# Patient Record
Sex: MALE | Race: WHITE | Employment: FULL TIME | URBAN - METROPOLITAN AREA
[De-identification: names, ages, dates, MRNs, and addresses within clinical notes are randomized per-mention and may not be internally consistent; named-entity substitution may affect disease eponyms.]

---

## 2017-01-30 ENCOUNTER — HOSPITAL ENCOUNTER (OUTPATIENT)
Dept: PHYSICAL THERAPY | Age: 57
Discharge: HOME | End: 2017-01-30
Payer: COMMERCIAL

## 2017-01-30 DIAGNOSIS — M25.511 ACUTE PAIN OF RIGHT SHOULDER: ICD-10-CM

## 2017-01-30 PROCEDURE — 97110 THERAPEUTIC EXERCISES: CPT

## 2017-01-30 PROCEDURE — 97162 PT EVAL MOD COMPLEX 30 MIN: CPT

## 2017-01-30 NOTE — PROGRESS NOTES
Izzy Lex Vásquez.  : 1960 [id number] [facility],[address]  [address] [place] [address].  [phone]   [phone]        OUTPATIENT PHYSICAL [place] Assessment 2017   Visit #1     ICD-10: Treatment Diagnosis: Cervicalgia (M54.2) , Abnormal posture (R29.3) , Radiculopathy, cervical region (M54.12)    Precautions/Allergies:   Bactrim [sulfamethoxazole-trimethoprim]   Fall Risk Score: 1 (? 5 = High Risk)  MD Orders: Eval and Treat  MEDICAL/REFERRING DIAGNOSIS:  Acute pain of right shoulder [M25.511]   DATE OF ONSET: 2016  REFERRING PHYSICIAN: Dhara Medeiros MD  RETURN PHYSICIAN APPOINTMENT: tbd      INITIAL ASSESSMENT:  Mr. Jeffrey Galeazzi presents to therapy with pain in the neck radiating down the R arm causing weakness, difficulty with functional activities and numbness and tingling. Pt would benefit from skilled PT for above deficits to return to prior level of function painfree. PROBLEM LIST (Impacting functional limitations):  1. Decreased Strength  2. Decreased ADL/Functional Activities  3. Increased Pain  4. Decreased Activity Tolerance  5. Decreased Flexibility/Joint Mobility INTERVENTIONS PLANNED:  1. Cold  2. Electrical Stimulation  3. Heat  4. Home Exercise Program (HEP)  5. Manual Therapy  6. Range of Motion (ROM)  7. Therapeutic Exercise/Strengthening  8. Ultrasound (US)  9. Traction   TREATMENT PLAN:  Effective Dates: 17 TO 3-10-17. Frequency/Duration: 2 times a week for 6 weeks  GOALS: (Goals have been discussed and agreed upon with patient.)  Short-Term Functional Goals: Time Frame: 2 weeks   1. Mr. Jeffrey Galeazzi to be independent with HEP. 2. Pt able to tolerate all stretches with improved symptoms in the R arm and less reported numbness and tingling. 3. Pt to have less episodes of waking up from symptoms with proper posture and improved positioning while sleeping. Discharge Goals: Time Frame: 6 weeks   1.  Pt able to tolerate increased work activities without symptoms in the R arm and no pain in the neck 100% of time. 2. Pt able to tolerate static sitting and standing without pain in the neck and no numbness in the R arm. 3. Pt to have no limitations at work and no strength deficits noted in the R arm due to the pain and the numbness 100% of time. Rehabilitation Potential For Stated Goals: Good  Regarding Izzy Burnett Sr.'s therapy, I certify that the treatment plan above will be carried out by a therapist or under their direction. Thank you for this referral,  Belinda Goltz, PT, DPT       Referring Physician Signature: Dhara Medeiros MD              Date                      HISTORY:   History of Present Injury/Illness (Reason for Referral):  Pt 63 y/o M with pain in the R shoulder and arm with radiating symptoms into the hand that started a few months ago. Pt has had some muscle relaxers which helped and he also had some steriod injections (not into the muscle) which loosened up the muscle as well but it still keeps coming back. He feels the symptoms with heavy activities and work activities. Pt stated the pain was constant until the medications. Now he is having tingling and numbness and he feels like the relief comes after popping his neck. He has C6-C7 fusion in 2011 and his last MRI was 2014. These symptoms are not the original pain he had prior to the fusion. Pt is waking up from tingling and is having to reposition on his back to relieve the symptoms. Past Medical History/Comorbidities:   Mr. Jeffrey Galeazzi  has a past medical history of Chest pain (8/11/2016); Chronic cellulitis; Diabetes ([facility] [address] History of kidney stones; Hypertension; Obesity; and Unspecified adverse effect of anesthesia (1997). He also has no past medical history of Coagulation defects; COPD; DEMENTIA; Gastrointestinal disorder; Infectious disease; or Neurological disorder.   Mr. Jeffrey Galeazzi  has a past surgical history that includes urological (2002); cervical fusion; cervical fusion (6-20-12); hernia repair; sinus surgery proc unlisted (1997); orthopaedic (Right); mohs procedure (Right); knee arthroscopy (Right, 2012); and knee arthroscopy (Left, 2013). 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 , Disp: 15 Tab, Rfl: 0    MAGNESIUM PO, Take  by mouth daily. , Disp: , Rfl:     BENICAR HCT 40-12.5 mg per tablet, TAKE 1 TABLET BY MOUTH EVERY DAY, Disp: 90 Tab, Rfl: 1    glimepiride (AMARYL) 2 mg tablet, TAKE 1 TABLET BY MOUTH TWO (2) TIMES A DAY., Disp: 90 Tab, Rfl: 1    multivitamin (ONE A DAY) tablet, Take 1 Tab by mouth daily. , Disp: , Rfl:     aspirin 81 mg chewable tablet, Take 1 Tab by mouth daily. , Disp: 30 Tab, Rfl: 0    albuterol (PROVENTIL HFA, VENTOLIN HFA, PROAIR HFA) 90 mcg/actuation inhaler, Take 2 Puffs by inhalation every four (4) hours as needed. Indications: ACUTE ASTHMA ATTACK, Disp: 3 Inhaler, Rfl: 1    metFORMIN (GLUCOPHAGE) 1,000 mg tablet, Take 1 Tab by mouth two (2) times daily (with meals). , Disp: 180 Tab, Rfl: 1    albuterol (PROVENTIL VENTOLIN) 2.5 mg /3 mL (0.083 %) nebulizer solution, 3 mL by Nebulization route every four (4) hours as needed for Wheezing., Disp: 24 Each, Rfl: 5    valACYclovir (VALTREX) 1 gram tablet, Take 1 Tab by mouth daily. , Disp: 30 Tab, Rfl: 11    fluticasone-vilanterol (BREO ELLIPTA) 200-25 mcg/dose inhaler, 1 inhalation daily, rinse mouth after use, Disp: 1 Inhaler, Rfl: 11    cpap machine kit, , Disp: , Rfl:     montelukast (SINGULAIR) 10 mg tablet, Take 10 mg by mouth daily., Disp: , Rfl:    Date Last Reviewed:  1/30/2017   # of Personal Factors/Comorbidities that affect the Plan of Care: 3+: HIGH COMPLEXITY   EXAMINATION:   Observation/Orthostatic Postural Assessment:          Standing with forward head   Palpation:          No tenderness to touch   ROM:     Cervical ROM is WFL except limitation in extension due to fusion       Strength:     L shoulder      Flexion:      Extension:      Abduction:      ER:      IR:    R shoulder       Flexion:       Extension:       Abduction:       ER:       IR:    Special Tests:          Compression (  Neurological Screen:        Neural Tension Tests:  Some tightness noted in the median nerve R UEs         Sensation: numbness and tingling in the R arm into the fingers but relieved with neck manipulation   Functional Mobility:         Independent   Balance:          Good    Body Structures Involved:  1. Nerves  2. Joints  3. Muscles Body Functions Affected:  1. Sensory/Pain  2. Movement Related Activities and Participation Affected:  1. General Tasks and Demands  2. Mobility   # of elements that affect the Plan of Care: 4+: HIGH COMPLEXITY   CLINICAL PRESENTATION:   Presentation: Evolving clinical presentation with changing clinical characteristics: MODERATE COMPLEXITY   CLINICAL DECISION MAKING:   Outcome Measure: Tool Used: VAS   Score:  Initial: 4/10 overall constant in R arm Most Recent: X (Date: -- )   Interpretation of Score: 40% impaired   Outcome Measure Conversion Site    Medical Necessity:   · Patient is expected to demonstrate progress in strength and range of motion to increase independence with functional activities painfree. .  Reason for Services/Other Comments:  · Patient continues to require present interventions due to patient's inability to perform functional and work activities painfree.  .   Use of outcome tool(s) and clinical judgement create a POC that gives a: Questionable prediction of patient's progress: MODERATE COMPLEXITY   TREATMENT:   (In addition to Assessment/Re-Assessment sessions the following treatments were rendered)  THERAPEUTIC EXERCISE: (see below for minutes):  Exercises per grid below to improve mobility and strength. Required minimal visual and verbal cues to promote proper body alignment, promote proper body posture and promote proper body mechanics. Progressed resistance, range and repetitions as indicated. MANUAL THERAPY: (see below for minutes): Joint mobilization and Soft tissue mobilization was utilized and necessary because of the patient's restricted joint motion, painful spasm and restricted motion of soft tissue. MODALITIES: (see below for minutes):      see chart below for details     Date: 1-30-17       Modalities:                                Therapeutic Exercise: 15 mins        Upper trap stretch  2x10SH bilateral        Levator stretch  2x10SH bilateral        Doorway stretch  2x10SH        Scapular retraction  x30        Cervical retraction  3x10                Proprioceptive Activities:                                Manual Therapy:                        Functional Activities:                                        HEP: Pt was given upper trap stretch, doorway stretch, levator stretch and scap retraction and cervical retraction for HEP and educated on all exercises. Treatment/Session Assessment:  Pt was told to not pop his neck by himself for the next few days and to just try to do the stretching for relief of the symptoms. Pt was also told to watch posture at work and to take frequent rest breaks as much as possible to help with the irritibility of the symptoms. Pt seems to have 1 of 3 issues going on. Either bulging disc, degenerated disc above or below the fusion encroaching on a nerve, or possible muscle spasms laying on nerve causing irritation.  Pt would benefit from modalities for pain relief, stretching for HEP and manual therapy for stretching as well as relief of spasm, neural flossing stretches for the R arm and postural reeducation. · Pain/ Symptoms: Initial:   4/10  Post Session:  4/10  ·   Compliance with Program/Exercises: Will assess as treatment progresses. · Recommendations/Intent for next treatment session: \"Next visit will focus on advancements to more challenging activities\".   Total Treatment Duration:  PT Patient Time In/Time Out  Time In: 1015  Time Out: Isabel 60, PT, DPT

## 2017-01-30 NOTE — PROGRESS NOTES
Ambulatory/Rehab Services H2 Model Falls Risk Assessment    Risk Factor Pts. ·   Confusion/Disorientation/Impulsivity  []    4 ·   Symptomatic Depression  []   2 ·   Altered Elimination  []   1 ·   Dizziness/Vertigo  []   1 ·   Gender (Male)  [x]   1 ·   Any administered antiepileptics (anticonvulsants):  []   2 ·   Any administered benzodiazepines:  []   1 ·   Visual Impairment (specify):  []   1 ·   Portable Oxygen Use  []   1 ·   Orthostatic ? BP  []   1 ·   History of Recent Falls (within 3 mos.)  []   5     Ability to Rise from Chair (choose one) Pts. ·   Ability to rise in a single movement  [x]   0 ·   Pushes up, successful in one attempt  []   1 ·   Multiple attempts, but successful  []   3 ·   Unable to rise without assistance  []   4   Total: (5 or greater = High Risk) 1     Falls Prevention Plan:   []                Physical Limitations to Exercise (specify):   []                Mobility Assistance Device (type):   []                Exercise/Equipment Adaptation (specify):    ©2010 [facility] Patent #[id number].  Federal Law prohibits the replication, distribution or use without written permission from [facility] [employer]

## 2017-01-31 ENCOUNTER — HOSPITAL ENCOUNTER (OUTPATIENT)
Dept: PHYSICAL THERAPY | Age: 57
Discharge: HOME | End: 2017-01-31
Payer: COMMERCIAL

## 2017-01-31 PROCEDURE — 97140 MANUAL THERAPY 1/> REGIONS: CPT

## 2017-01-31 PROCEDURE — 97110 THERAPEUTIC EXERCISES: CPT

## 2017-01-31 PROCEDURE — 97014 ELECTRIC STIMULATION THERAPY: CPT

## 2017-01-31 NOTE — PROGRESS NOTES
Mayur Bradley .  : 1960 [address] [facility],[address]  [address] [place] [address].  [phone]   [phone]        OUTPATIENT PHYSICAL THERAPY:Daily Note 2017   Visit #2     ICD-10: Treatment Diagnosis: Cervicalgia (M54.2) , Abnormal posture (R29.3) , Radiculopathy, cervical region (M54.12)    Precautions/Allergies:   Bactrim [sulfamethoxazole-trimethoprim]   Fall Risk Score: 1 (? 5 = High Risk)  MD Orders: Eval and Treat  MEDICAL/REFERRING DIAGNOSIS:  Pain in right shoulder [M25.511]   DATE OF ONSET: 2016  REFERRING PHYSICIAN: Ravin Posada MD  RETURN PHYSICIAN APPOINTMENT: tbd      INITIAL ASSESSMENT:  Mr. Cintron Patient presents to therapy with pain in the neck radiating down the R arm causing weakness, difficulty with functional activities and numbness and tingling. Pt would benefit from skilled PT for above deficits to return to prior level of function painfree. PROBLEM LIST (Impacting functional limitations):  1. Decreased Strength  2. Decreased ADL/Functional Activities  3. Increased Pain  4. Decreased Activity Tolerance  5. Decreased Flexibility/Joint Mobility INTERVENTIONS PLANNED:  1. Cold  2. Electrical Stimulation  3. Heat  4. Home Exercise Program (HEP)  5. Manual Therapy  6. Range of Motion (ROM)  7. Therapeutic Exercise/Strengthening  8. Ultrasound (US)  9. Traction   TREATMENT PLAN:  Effective Dates: 17 TO 3-10-17. Frequency/Duration: 2 times a week for 6 weeks  GOALS: (Goals have been discussed and agreed upon with patient.)  Short-Term Functional Goals: Time Frame: 2 weeks   1. Mr. Cintron Patient to be independent with HEP. 2. Pt able to tolerate all stretches with improved symptoms in the R arm and less reported numbness and tingling. 3. Pt to have less episodes of waking up from symptoms with proper posture and improved positioning while sleeping. Discharge Goals: Time Frame: 6 weeks   1.  Pt able to tolerate increased work activities without symptoms in the R arm and no pain in the neck 100% of time. 2. Pt able to tolerate static sitting and standing without pain in the neck and no numbness in the R arm. 3. Pt to have no limitations at work and no strength deficits noted in the R arm due to the pain and the numbness 100% of time. Rehabilitation Potential For Stated Goals: Good                HISTORY:   History of Present Injury/Illness (Reason for Referral):  Pt 65 y/o M with pain in the R shoulder and arm with radiating symptoms into the hand that started a few months ago. Pt has had some muscle relaxers which helped and he also had some steriod injections (not into the muscle) which loosened up the muscle as well but it still keeps coming back. He feels the symptoms with heavy activities and work activities. Pt stated the pain was constant until the medications. Now he is having tingling and numbness and he feels like the relief comes after popping his neck. He has C6-C7 fusion in 2011 and his last MRI was 2014. These symptoms are not the original pain he had prior to the fusion. Pt is waking up from tingling and is having to reposition on his back to relieve the symptoms. Past Medical History/Comorbidities:   Mr. Tracy Gee  has a past medical history of Chest pain (8/11/2016); Chronic cellulitis; Diabetes ([facility] [address] History of kidney stones; Hypertension; Obesity; and Unspecified adverse effect of anesthesia (1997). He also has no past medical history of Coagulation defects; COPD; DEMENTIA; Gastrointestinal disorder; Infectious disease; or Neurological disorder. Mr. Tracy Gee  has a past surgical history that includes urological (2002); cervical fusion; cervical fusion (6-20-12); hernia repair; sinus surgery proc unlisted (1997); orthopaedic (Right); mohs procedure (Right); knee arthroscopy (Right, 2012); and knee arthroscopy (Left, 2013).   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 , Disp: 15 Tab, Rfl: 0    MAGNESIUM PO, Take  by mouth daily. , Disp: , Rfl:     BENICAR HCT 40-12.5 mg per tablet, TAKE 1 TABLET BY MOUTH EVERY DAY, Disp: 90 Tab, Rfl: 1    glimepiride (AMARYL) 2 mg tablet, TAKE 1 TABLET BY MOUTH TWO (2) TIMES A DAY., Disp: 90 Tab, Rfl: 1    multivitamin (ONE A DAY) tablet, Take 1 Tab by mouth daily. , Disp: , Rfl:     aspirin 81 mg chewable tablet, Take 1 Tab by mouth daily. , Disp: 30 Tab, Rfl: 0    albuterol (PROVENTIL HFA, VENTOLIN HFA, PROAIR HFA) 90 mcg/actuation inhaler, Take 2 Puffs by inhalation every four (4) hours as needed. Indications: ACUTE ASTHMA ATTACK, Disp: 3 Inhaler, Rfl: 1    metFORMIN (GLUCOPHAGE) 1,000 mg tablet, Take 1 Tab by mouth two (2) times daily (with meals). , Disp: 180 Tab, Rfl: 1    albuterol (PROVENTIL VENTOLIN) 2.5 mg /3 mL (0.083 %) nebulizer solution, 3 mL by Nebulization route every four (4) hours as needed for Wheezing., Disp: 24 Each, Rfl: 5    valACYclovir (VALTREX) 1 gram tablet, Take 1 Tab by mouth daily. , Disp: 30 Tab, Rfl: 11    fluticasone-vilanterol (BREO ELLIPTA) 200-25 mcg/dose inhaler, 1 inhalation daily, rinse mouth after use, Disp: 1 Inhaler, Rfl: 11    cpap machine kit, , Disp: , Rfl:     montelukast (SINGULAIR) 10 mg tablet, Take 10 mg by mouth daily. , Disp: , Rfl:    Date Last Reviewed:  1/31/2017   EXAMINATION:   Observation/Orthostatic Postural Assessment:          Standing with forward head   Palpation:          No tenderness to touch   ROM:     Cervical ROM is WFL except limitation in extension due to fusion       Strength:     L shoulder      Flexion:      Extension:      Abduction:      ER:      IR:    R shoulder       Flexion:       Extension:       Abduction:       ER:       IR:    Special Tests:          Compression (  Neurological Screen:        Neural Tension Tests:  Some tightness noted in the median nerve R UEs         Sensation: numbness and tingling in the R arm into the fingers but relieved with neck manipulation   Functional Mobility:         Independent   Balance:          Good    Body Structures Involved:  1. Nerves  2. Joints  3. Muscles Body Functions Affected:  1. Sensory/Pain  2. Movement Related Activities and Participation Affected:  1. General Tasks and Demands  2. Mobility   CLINICAL PRESENTATION:   CLINICAL DECISION MAKING:   Outcome Measure: Tool Used: VAS   Score:  Initial: 4/10 overall constant in R arm Most Recent: X (Date: -- )   Interpretation of Score: 40% impaired   Outcome Measure Conversion Site    Medical Necessity:   · Patient is expected to demonstrate progress in strength and range of motion to increase independence with functional activities painfree. .  Reason for Services/Other Comments:  · Patient continues to require present interventions due to patient's inability to perform functional and work activities painfree. .   TREATMENT:   (In addition to Assessment/Re-Assessment sessions the following treatments were rendered)  THERAPEUTIC EXERCISE: (see below for minutes):  Exercises per grid below to improve mobility and strength. Required minimal visual and verbal cues to promote proper body alignment, promote proper body posture and promote proper body mechanics. Progressed resistance, range and repetitions as indicated. MANUAL THERAPY: (see below for minutes): Joint mobilization and Soft tissue mobilization was utilized and necessary because of the patient's restricted joint motion, painful spasm and restricted motion of soft tissue.    MODALITIES: (see below for minutes):      see chart below for details     Date: 1-30-17 1-31-17      Modalities:  15 mins       IFC with HP to R scapular and upper trap region   15 mins prior to tx in sitting                      Therapeutic Exercise: 15 mins  15 mins       Upper trap stretch  2x10SH bilateral  Repeat       Levator stretch  2x10SH bilateral  Repeat       Doorway stretch  2x10SH        Scapular retraction  x30        Cervical retraction  3x10        Median nerve neural flossing   3x5 reps       Proprioceptive Activities:                                Manual Therapy:  15 mins       STM upper trap and scapular region R side   15 mins               Functional Activities:                                        HEP: Pt was given upper trap stretch, doorway stretch, levator stretch and scap retraction and cervical retraction for HEP and educated on all exercises. Treatment/Session Assessment: Pt stated he felt better today after the stretching. Pt was given neural stretching for median nerve for HEP. Continue with POC. · Pain/ Symptoms: Initial:   1/10   \"I had a little fall today but it was my foot. I did my exercises and my arm immediately went numb with the levator stretch. \"  Post Session:  1/10  ·   Compliance with Program/Exercises: Will assess as treatment progresses. · Recommendations/Intent for next treatment session: \"Next visit will focus on advancements to more challenging activities\".   Total Treatment Duration:  PT Patient Time In/Time Out  Time In: 1100  Time Out: 1215     Manda Smith, PT, DPT

## 2017-02-07 ENCOUNTER — HOSPITAL ENCOUNTER (OUTPATIENT)
Dept: PHYSICAL THERAPY | Age: 57
Discharge: HOME | End: 2017-02-07

## 2017-02-10 ENCOUNTER — HOSPITAL ENCOUNTER (OUTPATIENT)
Dept: PHYSICAL THERAPY | Age: 57
Discharge: HOME | End: 2017-02-10

## 2017-04-17 NOTE — PROGRESS NOTES
Carlos Enrique Camejo .  : 1960 [address]  [phone]   [phone]        OUTPATIENT PHYSICAL THERAPY:Discontinuation Summary 2017        ICD-10: Treatment Diagnosis: Cervicalgia (M54.2) , Abnormal posture (R29.3) , Radiculopathy, cervical region (M54.12)    Precautions/Allergies:   Bactrim [sulfamethoxazole-trimethoprim]   Fall Risk Score: 1 (? 5 = High Risk)  MD Orders: Eval and Treat  MEDICAL/REFERRING DIAGNOSIS:  Pain in right shoulder [M25.511]   DATE OF ONSET: 2016  REFERRING PHYSICIAN: Karla Lomax MD  RETURN PHYSICIAN APPOINTMENT: tbd      INITIAL ASSESSMENT:  Mr. Philip Nam presents to therapy with pain in the neck radiating down the R arm causing weakness, difficulty with functional activities and numbness and tingling. Pt would benefit from skilled PT for above deficits to return to prior level of function painfree. PROBLEM LIST (Impacting functional limitations):  1. Decreased Strength  2. Decreased ADL/Functional Activities  3. Increased Pain  4. Decreased Activity Tolerance  5. Decreased Flexibility/Joint Mobility INTERVENTIONS PLANNED:  1. Cold  2. Electrical Stimulation  3. Heat  4. Home Exercise Program (HEP)  5. Manual Therapy  6. Range of Motion (ROM)  7. Therapeutic Exercise/Strengthening  8. Ultrasound (US)  9. Traction   TREATMENT PLAN:  Effective Dates: 17 TO 3-10-17. Frequency/Duration: 2 times a week for 6 weeks  GOALS: (Goals have been discussed and agreed upon with patient.)  Short-Term Functional Goals: Time Frame: 2 weeks   1. Mr. Philip Nam to be independent with HEP. 2. Pt able to tolerate all stretches with improved symptoms in the R arm and less reported numbness and tingling. 3. Pt to have less episodes of waking up from symptoms with proper posture and improved positioning while sleeping. Discharge Goals: Time Frame: 6 weeks   1.  Pt able to tolerate increased work activities without symptoms in the R arm and no pain in the neck 100% of time. 2. Pt able to tolerate static sitting and standing without pain in the neck and no numbness in the R arm. 3. Pt to have no limitations at work and no strength deficits noted in the R arm due to the pain and the numbness 100% of time. Rehabilitation Potential For Stated Goals: Good                HISTORY:   History of Present Injury/Illness (Reason for Referral):  Pt 65 y/o M with pain in the R shoulder and arm with radiating symptoms into the hand that started a few months ago. Pt has had some muscle relaxers which helped and he also had some steriod injections (not into the muscle) which loosened up the muscle as well but it still keeps coming back. He feels the symptoms with heavy activities and work activities. Pt stated the pain was constant until the medications. Now he is having tingling and numbness and he feels like the relief comes after popping his neck. He has C6-C7 fusion in 2011 and his last MRI was 2014. These symptoms are not the original pain he had prior to the fusion. Pt is waking up from tingling and is having to reposition on his back to relieve the symptoms. Past Medical History/Comorbidities:   Mr. Sunday Schwarz  has a past medical history of Chest pain (8/11/2016); Chronic cellulitis; Diabetes ([facility] [address] History of kidney stones; Hypertension; Obesity; and Unspecified adverse effect of anesthesia (1997). He also has no past medical history of Coagulation defects; COPD; DEMENTIA; Gastrointestinal disorder; Infectious disease; or Neurological disorder. Mr. Sunday Schwarz  has a past surgical history that includes urological (2002); cervical fusion; cervical fusion (6-20-12); hernia repair; sinus surgery proc unlisted (1997); orthopaedic (Right); mohs procedure (Right); knee arthroscopy (Right, 2012); and knee arthroscopy (Left, 2013).   Social History/Living Environment:     Lives with wife   Prior Level of Function/Work/Activity:  Works making scotch tape   Dominant Side:         RIGHT  Previous Treatment Approaches:          Muscle relaxers (not taking anymore)         Steroid shot (helped but not anymore)   Personal Factors:          Past/Current Experience:  Previous fusion at C6-7  Current Medications:    Current Outpatient Prescriptions:     glimepiride (AMARYL) 2 mg tablet, TAKE 1 TABLET BY MOUTH TWO (2) TIMES A DAY., Disp: 90 Tab, Rfl: 1    tadalafil (CIALIS) 5 mg tablet, Take 1 Tab by mouth daily. , Disp: 30 Tab, Rfl: 2    cefPROZIL (CEFZIL) 250 mg tablet, Take 1 Tab by mouth two (2) times a day., Disp: 20 Tab, Rfl: 0    methylPREDNISolone (MEDROL, DIEGO,) 4 mg tablet, Take 1 Tab by mouth Specific Days and Specific Times. , Disp: 1 Dose Pack, Rfl: 0    ketorolac (TORADOL) 10 mg tablet, Take 1 Tab by mouth three (3) times daily. , Disp: 15 Tab, Rfl: 0    MAGNESIUM PO, Take  by mouth daily. , Disp: , Rfl:     BENICAR HCT 40-12.5 mg per tablet, TAKE 1 TABLET BY MOUTH EVERY DAY, Disp: 90 Tab, Rfl: 1    multivitamin (ONE A DAY) tablet, Take 1 Tab by mouth daily. , Disp: , Rfl:     aspirin 81 mg chewable tablet, Take 1 Tab by mouth daily. , Disp: 30 Tab, Rfl: 0    albuterol (PROVENTIL HFA, VENTOLIN HFA, PROAIR HFA) 90 mcg/actuation inhaler, Take 2 Puffs by inhalation every four (4) hours as needed. Indications: ACUTE ASTHMA ATTACK, Disp: 3 Inhaler, Rfl: 1    metFORMIN (GLUCOPHAGE) 1,000 mg tablet, Take 1 Tab by mouth two (2) times daily (with meals). , Disp: 180 Tab, Rfl: 1    albuterol (PROVENTIL VENTOLIN) 2.5 mg /3 mL (0.083 %) nebulizer solution, 3 mL by Nebulization route every four (4) hours as needed for Wheezing., Disp: 24 Each, Rfl: 5    valACYclovir (VALTREX) 1 gram tablet, Take 1 Tab by mouth daily. , Disp: 30 Tab, Rfl: 11    fluticasone-vilanterol (BREO ELLIPTA) 200-25 mcg/dose inhaler, 1 inhalation daily, rinse mouth after use, Disp: 1 Inhaler, Rfl: 11    cpap machine kit, , Disp: , Rfl:    montelukast (SINGULAIR) 10 mg tablet, Take 10 mg by mouth daily. , Disp: , Rfl:    Date Last Reviewed:  1/31/2017   EXAMINATION:   Observation/Orthostatic Postural Assessment:          Standing with forward head   Palpation:          No tenderness to touch   ROM:     Cervical ROM is WFL except limitation in extension due to fusion       Strength:     L shoulder      Flexion:      Extension:      Abduction:      ER:      IR:    R shoulder       Flexion:       Extension:       Abduction:       ER:       IR:    Special Tests:          Compression (  Neurological Screen:        Neural Tension Tests:  Some tightness noted in the median nerve R UEs         Sensation: numbness and tingling in the R arm into the fingers but relieved with neck manipulation   Functional Mobility:         Independent   Balance:          Good    Body Structures Involved:  1. Nerves  2. Joints  3. Muscles Body Functions Affected:  1. Sensory/Pain  2. Movement Related Activities and Participation Affected:  1. General Tasks and Demands  2. Mobility   CLINICAL PRESENTATION:   CLINICAL DECISION MAKING:   Outcome Measure: Tool Used: VAS   Score:  Initial: 4/10 overall constant in R arm Most Recent: X (Date: -- )   Interpretation of Score: 40% impaired   Outcome Measure Conversion Site    Medical Necessity:   · Patient is expected to demonstrate progress in strength and range of motion to increase independence with functional activities painfree. .  Reason for Services/Other Comments:  · Patient continues to require present interventions due to patient's inability to perform functional and work activities painfree. .   TREATMENT:   (In addition to Assessment/Re-Assessment sessions the following treatments were rendered)  THERAPEUTIC EXERCISE: (see below for minutes):  Exercises per grid below to improve mobility and strength.   Required minimal visual and verbal cues to promote proper body alignment, promote proper body posture and promote proper body mechanics. Progressed resistance, range and repetitions as indicated. MANUAL THERAPY: (see below for minutes): Joint mobilization and Soft tissue mobilization was utilized and necessary because of the patient's restricted joint motion, painful spasm and restricted motion of soft tissue. MODALITIES: (see below for minutes):      see chart below for details     Date: 1-30-17 1-31-17      Modalities:  15 mins       IFC with HP to R scapular and upper trap region   15 mins prior to tx in sitting                      Therapeutic Exercise: 15 mins  15 mins       Upper trap stretch  2x10SH bilateral  Repeat       Levator stretch  2x10SH bilateral  Repeat       Doorway stretch  2x10SH        Scapular retraction  x30        Cervical retraction  3x10        Median nerve neural flossing   3x5 reps       Proprioceptive Activities:                                Manual Therapy:  15 mins       STM upper trap and scapular region R side   15 mins               Functional Activities:                                        HEP: Pt was given upper trap stretch, doorway stretch, levator stretch and scap retraction and cervical retraction for HEP and educated on all exercises. Treatment/Session Assessment: Pt did not return to therapy following 2nd appt. Pt is discontinued.       Manju Payment, PT, DPT

## 2017-06-05 ENCOUNTER — HOSPITAL ENCOUNTER (OUTPATIENT)
Dept: PHYSICAL THERAPY | Age: 57
Discharge: HOME | End: 2017-06-05
Payer: COMMERCIAL

## 2017-06-05 PROCEDURE — 97140 MANUAL THERAPY 1/> REGIONS: CPT

## 2017-06-05 PROCEDURE — 97162 PT EVAL MOD COMPLEX 30 MIN: CPT

## 2017-06-05 PROCEDURE — 97032 APPL MODALITY 1+ESTIM EA 15: CPT

## 2017-06-05 PROCEDURE — 97110 THERAPEUTIC EXERCISES: CPT

## 2017-06-05 NOTE — PROGRESS NOTES
Ambulatory/Rehab Services H2 Model Falls Risk Assessment    Risk Factor Pts. ·   Confusion/Disorientation/Impulsivity  []    4 ·   Symptomatic Depression  []   2 ·   Altered Elimination  []   1 ·   Dizziness/Vertigo  []   1 ·   Gender (Male)  [x]   1 ·   Any administered antiepileptics (anticonvulsants):  []   2 ·   Any administered benzodiazepines:  []   1 ·   Visual Impairment (specify):  []   1 ·   Portable Oxygen Use  []   1 ·   Orthostatic ? BP  []   1 ·   History of Recent Falls (within 3 mos.)  []   5     Ability to Rise from Chair (choose one) Pts. ·   Ability to rise in a single movement  [x]   0 ·   Pushes up, successful in one attempt  []   1 ·   Multiple attempts, but successful  []   3 ·   Unable to rise without assistance  []   4   Total: (5 or greater = High Risk) 1     Falls Prevention Plan:   []                Physical Limitations to Exercise (specify):   []                Mobility Assistance Device (type):   []                Exercise/Equipment Adaptation (specify):    ©2010 [facility] Patent #[id number].  Federal Law prohibits the replication, distribution or use without written permission from [facility]

## 2017-06-05 NOTE — PROGRESS NOTES
Ana Pagan Sr.  : 1960 [id number] [facility]  [address] [place] [address]  [phone]   [phone]        OUTPATIENT PHYSICAL THERAPY:Initial Assessment 2017        ICD-10: Treatment Diagnosis: Shoulder lesion, unspecified, right shoulder (M75.91)  Precautions/Allergies:   Bactrim [sulfamethoxazole-trimethoprim]   Fall Risk Score: 1 (? 5 = High Risk)  MD Orders: Eval and treat MEDICAL/REFERRING DIAGNOSIS:  Right shoulder pain [M25.511]   DATE OF ONSET:   REFERRING PHYSICIAN: Sunita Berger MD  RETURN PHYSICIAN APPOINTMENT: TBD     INITIAL ASSESSMENT:  Mr. Mony Brantley presents with cervical radiculopathy pain, upper cervical facet joint dysfunction, and right upper trapezius muscle strain. He will benefit from PT services to re mediate impairments. He has no red flags present at time of evaluation and is appropriate for PT services. He has a history of successful cervical spine fusions and has not had current imaging since he has onset of new symptoms. If he does not improve with PT services in the next 4 weeks recommend consult with orthopedic to rule out complications of his cervical spine fusions. PROBLEM LIST (Impacting functional limitations):  1. Decreased Strength  2. Decreased ADL/Functional Activities  3. Increased Pain  4. Decreased Activity Tolerance  5. Decreased Flexibility/Joint Mobility  6. Poor posture INTERVENTIONS PLANNED:  1. Cold  2. Cryotherapy  3. Electrical Stimulation  4. Heat  5. Home Exercise Program (HEP)  6. Manual Therapy  7. Neuromuscular Re-education/Strengthening  8. Range of Motion (ROM)  9. Therapeutic Activites  10. Therapeutic Exercise/Strengthening  11. Dry Needling, Mechanical Traction   TREATMENT PLAN:  Effective Dates: 17 TO 17.   Frequency/Duration: 2 times a week for 12 weeks  GOALS: (Goals have been discussed and agreed upon with patient.)  Short-Term Functional Goals: Time Frame: 2 weeks  1. Patient will be independent with HEP without pain. 2. Patient will report that sleep is improved to > 5 hours without waking up due to numbness and tingling. Discharge Goals: Time Frame: 12 weeks  1. Patient will have NDI score < 5 points allowing him improved community participation,  2. Patient will have C/S AROM improved to 80 deg rotation with centralization of symptoms so he can look over shoulder safely. 3. Patient will have posture WNL to reduce neck pain and centralize symptoms. Rehabilitation Potential For Stated Goals: Good  Regarding Alma George Sr.'s therapy, I certify that the treatment plan above will be carried out by a therapist or under their direction. Thank you for this referral,  Pancho Pichardo PT       Referring Physician Signature: Disha Mckeon MD              Date                      HISTORY:   History of Present Injury/Illness (Reason for Referral):  63 y/o M with complain of right upper trapezius pain and right hand numbness that is provoked with overhead movement. He wakes up when sleeping with numbness in hand. Pain is 9/10 at worst everyday constant. He has not had imaging. He has history of two C5-7 fusion 2002 and 2012. His first surgery was not successful and he had surgery again 10 years later. Before his neck surgery he reported  weakness in hand. He says his new symptoms are different. Past Medical History/Comorbidities:   Mr. Norm Carter  has a past medical history of Chest pain (8/11/2016); Chronic cellulitis; Diabetes ([facility] [address] History of kidney stones; Hypertension; Obesity; and Unspecified adverse effect of anesthesia (1997). He also has no past medical history of Coagulation defects; COPD; DEMENTIA; Gastrointestinal disorder; Infectious disease; or Neurological disorder.   Mr. Norm Carter  has a past surgical history that includes urological (2002); cervical fusion; cervical fusion (6-20-12); hernia repair; sinus surgery proc unlisted (1997); orthopaedic (Right); mohs procedure (Right); knee arthroscopy (Right, 2012); and knee arthroscopy (Left, 2013). Social History/Living Environment:    Lives with wife  Prior Level of Function/Work/Activity:      Independent, 50 hour/week work restriction due to health   Previous Treatment Approaches:          PT Jan 2017 that reduced symptoms but he had a dysruption in insurance coverage and he couldn't come anymore. He is not doing his HEP. Current Medications:    Current Outpatient Prescriptions:     methocarbamol (ROBAXIN) 500 mg tablet, Take 1 Tab by mouth nightly as needed. , Disp: 20 Tab, Rfl: 0    metFORMIN (GLUCOPHAGE) 1,000 mg tablet, TAKE 1 TAB BY MOUTH TWO (2) TIMES DAILY (WITH MEALS). , Disp: 180 Tab, Rfl: 1    valACYclovir (VALTREX) 1 gram tablet, TAKE 1 TAB BY MOUTH DAILY. , Disp: 30 Tab, Rfl: 7    glimepiride (AMARYL) 2 mg tablet, TAKE 1 TABLET BY MOUTH TWO (2) TIMES A DAY., Disp: 90 Tab, Rfl: 1    tadalafil (CIALIS) 5 mg tablet, Take 1 Tab by mouth daily. , Disp: 30 Tab, Rfl: 2    cefPROZIL (CEFZIL) 250 mg tablet, Take 1 Tab by mouth two (2) times a day., Disp: 20 Tab, Rfl: 0    methylPREDNISolone (MEDROL, DIEGO,) 4 mg tablet, Take 1 Tab by mouth Specific Days and Specific Times. , Disp: 1 Dose Pack, Rfl: 0    ketorolac (TORADOL) 10 mg tablet, Take 1 Tab by mouth three (3) times daily. , Disp: 15 Tab, Rfl: 0    MAGNESIUM PO, Take  by mouth daily. , Disp: , Rfl:     BENICAR HCT 40-12.5 mg per tablet, TAKE 1 TABLET BY MOUTH EVERY DAY, Disp: 90 Tab, Rfl: 1    multivitamin (ONE A DAY) tablet, Take 1 Tab by mouth daily. , Disp: , Rfl:     aspirin 81 mg chewable tablet, Take 1 Tab by mouth daily. , Disp: 30 Tab, Rfl: 0    albuterol (PROVENTIL HFA, VENTOLIN HFA, PROAIR HFA) 90 mcg/actuation inhaler, Take 2 Puffs by inhalation every four (4) hours as needed.  Indications: ACUTE ASTHMA ATTACK, Disp: 3 Inhaler, Rfl: 1    albuterol (PROVENTIL VENTOLIN) 2.5 mg /3 mL (0.083 %) nebulizer solution, 3 mL by Nebulization route every four (4) hours as needed for Wheezing., Disp: 24 Each, Rfl: 5    fluticasone-vilanterol (BREO ELLIPTA) 200-25 mcg/dose inhaler, 1 inhalation daily, rinse mouth after use, Disp: 1 Inhaler, Rfl: 11    cpap machine kit, , Disp: , Rfl:     montelukast (SINGULAIR) 10 mg tablet, Take 10 mg by mouth daily. , Disp: , Rfl:    Date Last Reviewed:  6/5/2017   # of Personal Factors/Comorbidities that affect the Plan of Care: 1-2: MODERATE COMPLEXITY   EXAMINATION:   Observation/Orthostatic Postural Assessment: Forward head  Palpation:          Increased pain and tone right UT, L SO, R 1st rib hypomobile and painful, L to R C/S C2-5 hypomobile and painful   ROM:     C/S AROM  Left rotation 65 deg (reproduces left arm n/t  Right rotation 70 deg  Flexion 25 deg  Extension 25 deg  Right SB 30 deg  Left SB 30 deg      Strength:     n/t      Special Tests:          Spurling's positive for R UE pain, ULNT median positive R UE, Transverse and Alar ligaments negative  Neurological Screen:        Sensation: light touch intact B UE  Functional Mobility:         n/a  Balance:          n/a   Body Structures Involved:  1. Joints  2. Muscles Body Functions Affected:  1. Neuromusculoskeletal  2. Movement Related Activities and Participation Affected:  1. General Tasks and Demands  2. Self Care  3. Domestic Life  4. Interpersonal Interactions and Relationships  5. Community, Social and [place]   # of elements that affect the Plan of Care: 3: MODERATE COMPLEXITY   CLINICAL PRESENTATION:   Presentation: Evolving clinical presentation with changing clinical characteristics: MODERATE COMPLEXITY   CLINICAL DECISION MAKING:   Outcome Measure: Tool Used: Neck Disability Index (NDI)  Score:  Initial: 14/50  Most Recent: X/50 (Date: -- )   Interpretation of Score: The Neck Disability Index is a revised form of the Oswestry Low Back Pain Index and is designed to measure the activities of daily living in person's with neck pain.   Each section is scored on a 0-5 scale, 5 representing the greatest disability. The scores of each section are added together for a total score of 50. Score 0 1-10 11-20 21-30 31-40 41-49 50   Modifier CH CI CJ CK CL CM CN         Medical Necessity:   · Patient is expected to demonstrate progress in range of motion, coordination and functional technique to decrease assistance required with sleeping. Reason for Services/Other Comments:  · Patient has been observed to have reduce ROM before, during or after an intervention. Use of outcome tool(s) and clinical judgement create a POC that gives a: Questionable prediction of patient's progress: MODERATE COMPLEXITY   TREATMENT:   (In addition to Assessment/Re-Assessment sessions the following treatments were rendered)  THERAPEUTIC EXERCISE: (see grid for minutes):  Exercises per grid below to improve mobility and coordination. Required moderate visual, verbal, manual and tactile cues to promote proper body alignment, promote proper body posture and promote proper body mechanics. Progressed resistance, range and complexity of movement as indicated. MANUAL THERAPY: (see grid for minutes): Joint mobilization, Soft tissue mobilization and Manipulation was utilized and necessary because of the patient's restricted joint motion, painful spasm, loss of articular motion and restricted motion of soft tissue. MODALITIES: (see grid for minutes): *  Ultrasound was used today secondary to the patient having tightened structures limiting joint motion that require an increase in extensibility and symptomatic soft tissue calcification. Ultrasound was used today to reduce pain, reduce spasm, reduce joint stiffness and increase muscle flexibility. *  Electrical Stimulation Therapy (IFC) was provided with intensity adjusted throughout treatment to patient tolerance. to reduce pain. *  Cold Pack Therapy in order to provide analgesia and reduce inflammation and edema. *  Hot Pack Therapy in order to relieve muscle spasm. MECHANICAL TRACTION: (see grid for minutes): Traction was used due to the patient's cervical radiculopathy in order to relieve pain in or originating from his spine. Date: 6/5/17  (visit 1)       Modalities: 5 min       IFC and heat B UTs                       Therapeutic Exercise: 15 mins       Supine chin tuck 10x       Seated UT stretch 10x       Seated Chin retraction 10x       TB rows with scap retraction Black 3x10       ULNT Median stretch 10x glide               Proprioceptive Activities:                                Manual Therapy: 15 mins       STM UTs with PROM rotation bilateral       SOR with distraction bilateral       Therapeutic Activities:                                        HEP: Handout provided 6/5/17  Treatment/Session Assessment:  Demonstrated good teach back with HEP. · Pain/ Symptoms: Initial:   9/10 Post Session:  5/10 ·   Compliance with Program/Exercises: Will assess as treatment progresses. · Recommendations/Intent for next treatment session: \"Next visit will focus on advancements to more challenging activities\".   Total Treatment Duration:  PT Patient Time In/Time Out  Time In: 1400  Time Out: [id number] [facility],

## 2017-06-07 ENCOUNTER — APPOINTMENT (OUTPATIENT)
Dept: PHYSICAL THERAPY | Age: 57
End: 2017-06-07
Payer: COMMERCIAL

## 2017-06-09 ENCOUNTER — HOSPITAL ENCOUNTER (OUTPATIENT)
Dept: PHYSICAL THERAPY | Age: 57
End: 2017-06-09
Payer: COMMERCIAL

## 2017-06-12 ENCOUNTER — HOSPITAL ENCOUNTER (OUTPATIENT)
Dept: PHYSICAL THERAPY | Age: 57
Discharge: HOME | End: 2017-06-12
Payer: COMMERCIAL

## 2017-06-12 PROCEDURE — 97014 ELECTRIC STIMULATION THERAPY: CPT

## 2017-06-12 PROCEDURE — 97140 MANUAL THERAPY 1/> REGIONS: CPT

## 2017-06-12 PROCEDURE — 97110 THERAPEUTIC EXERCISES: CPT

## 2017-06-12 NOTE — PROGRESS NOTES
Ayanna Hu .  : 1960 [id number] [facility],[address]  [address] [place] [address].  [phone]   [phone]        OUTPATIENT PHYSICAL THERAPY:Daily Note 2017        ICD-10: Treatment Diagnosis: Shoulder lesion, unspecified, right shoulder (M75.91)  Precautions/Allergies:   Bactrim [sulfamethoxazole-trimethoprim]   Fall Risk Score: 1 (? 5 = High Risk)  MD Orders: Eval and treat MEDICAL/REFERRING DIAGNOSIS:  Right shoulder pain [M25.511]   DATE OF ONSET:   REFERRING PHYSICIAN: Ruben Luna MD  RETURN PHYSICIAN APPOINTMENT: TBD     INITIAL ASSESSMENT:  Mr. Stephen Escobar presents with cervical radiculopathy pain, upper cervical facet joint dysfunction, and right upper trapezius muscle strain. He will benefit from PT services to re mediate impairments. He has no red flags present at time of evaluation and is appropriate for PT services. He has a history of successful cervical spine fusions and has not had current imaging since he has onset of new symptoms. If he does not improve with PT services in the next 4 weeks recommend consult with orthopedic to rule out complications of his cervical spine fusions. PROBLEM LIST (Impacting functional limitations):  1. Decreased Strength  2. Decreased ADL/Functional Activities  3. Increased Pain  4. Decreased Activity Tolerance  5. Decreased Flexibility/Joint Mobility  6. Poor posture INTERVENTIONS PLANNED:  1. Cold  2. Cryotherapy  3. Electrical Stimulation  4. Heat  5. Home Exercise Program (HEP)  6. Manual Therapy  7. Neuromuscular Re-education/Strengthening  8. Range of Motion (ROM)  9. Therapeutic Activites  10. Therapeutic Exercise/Strengthening  11. Dry Needling, Mechanical Traction   TREATMENT PLAN:  Effective Dates: 17 TO 17. Frequency/Duration: 2 times a week for 12 weeks  GOALS: (Goals have been discussed and agreed upon with patient.)  Short-Term Functional Goals: Time Frame: 2 weeks  1.  Patient will be independent with HEP without pain. 2. Patient will report that sleep is improved to > 5 hours without waking up due to numbness and tingling. Discharge Goals: Time Frame: 12 weeks  1. Patient will have NDI score < 5 points allowing him improved community participation,  2. Patient will have C/S AROM improved to 80 deg rotation with centralization of symptoms so he can look over shoulder safely. 3. Patient will have posture WNL to reduce neck pain and centralize symptoms. Rehabilitation Potential For Stated Goals: Good                     HISTORY:   History of Present Injury/Illness (Reason for Referral):  65 y/o M with complain of right upper trapezius pain and right hand numbness that is provoked with overhead movement. He wakes up when sleeping with numbness in hand. Pain is 9/10 at worst everyday constant. He has not had imaging. He has history of two C5-7 fusion 2002 and 2012. His first surgery was not successful and he had surgery again 10 years later. Before his neck surgery he reported  weakness in hand. He says his new symptoms are different. Past Medical History/Comorbidities:   Mr. Alina Cain  has a past medical history of Chest pain (8/11/2016); Chronic cellulitis; Diabetes ([facility] [address] History of kidney stones; Hypertension; Obesity; and Unspecified adverse effect of anesthesia (1997). He also has no past medical history of Coagulation defects; COPD; DEMENTIA; Gastrointestinal disorder; Infectious disease; or Neurological disorder. Mr. Alina Cain  has a past surgical history that includes urological (2002); cervical fusion; cervical fusion (6-20-12); hernia repair; sinus surgery proc unlisted (1997); orthopaedic (Right); mohs procedure (Right); knee arthroscopy (Right, 2012); and knee arthroscopy (Left, 2013).   Social History/Living Environment:    Lives with wife  Prior Level of Function/Work/Activity:      Independent, 50 hour/week work restriction due to health   Previous Treatment Approaches: PT Jan 2017 that reduced symptoms but he had a dysruption in insurance coverage and he couldn't come anymore. He is not doing his HEP. Current Medications:    Current Outpatient Prescriptions:     BENICAR HCT 40-12.5 mg per tablet, TAKE 1 TABLET BY MOUTH EVERY DAY, Disp: 90 Tab, Rfl: 1    methocarbamol (ROBAXIN) 500 mg tablet, Take 1 Tab by mouth nightly as needed. , Disp: 20 Tab, Rfl: 0    metFORMIN (GLUCOPHAGE) 1,000 mg tablet, TAKE 1 TAB BY MOUTH TWO (2) TIMES DAILY (WITH MEALS). , Disp: 180 Tab, Rfl: 1    valACYclovir (VALTREX) 1 gram tablet, TAKE 1 TAB BY MOUTH DAILY. , Disp: 30 Tab, Rfl: 7    glimepiride (AMARYL) 2 mg tablet, TAKE 1 TABLET BY MOUTH TWO (2) TIMES A DAY., Disp: 90 Tab, Rfl: 1    tadalafil (CIALIS) 5 mg tablet, Take 1 Tab by mouth daily. , Disp: 30 Tab, Rfl: 2    cefPROZIL (CEFZIL) 250 mg tablet, Take 1 Tab by mouth two (2) times a day., Disp: 20 Tab, Rfl: 0    methylPREDNISolone (MEDROL, DIEGO,) 4 mg tablet, Take 1 Tab by mouth Specific Days and Specific Times. , Disp: 1 Dose Pack, Rfl: 0    ketorolac (TORADOL) 10 mg tablet, Take 1 Tab by mouth three (3) times daily. , Disp: 15 Tab, Rfl: 0    MAGNESIUM PO, Take  by mouth daily. , Disp: , Rfl:     multivitamin (ONE A DAY) tablet, Take 1 Tab by mouth daily. , Disp: , Rfl:     aspirin 81 mg chewable tablet, Take 1 Tab by mouth daily. , Disp: 30 Tab, Rfl: 0    albuterol (PROVENTIL HFA, VENTOLIN HFA, PROAIR HFA) 90 mcg/actuation inhaler, Take 2 Puffs by inhalation every four (4) hours as needed.  Indications: ACUTE ASTHMA ATTACK, Disp: 3 Inhaler, Rfl: 1    albuterol (PROVENTIL VENTOLIN) 2.5 mg /3 mL (0.083 %) nebulizer solution, 3 mL by Nebulization route every four (4) hours as needed for Wheezing., Disp: 24 Each, Rfl: 5    fluticasone-vilanterol (BREO ELLIPTA) 200-25 mcg/dose inhaler, 1 inhalation daily, rinse mouth after use, Disp: 1 Inhaler, Rfl: 11    cpap machine kit, , Disp: , Rfl:     montelukast (SINGULAIR) 10 mg tablet, Take 10 mg by mouth daily. , Disp: , Rfl:    Date Last Reviewed:  6/12/2017   EXAMINATION:   Observation/Orthostatic Postural Assessment: Forward head  Palpation:          Increased pain and tone right UT, L SO, R 1st rib hypomobile and painful, L to R C/S C2-5 hypomobile and painful   ROM:     C/S AROM  Left rotation 65 deg (reproduces left arm n/t  Right rotation 70 deg  Flexion 25 deg  Extension 25 deg  Right SB 30 deg  Left SB 30 deg      Strength:     n/t      Special Tests:          Spurling's positive for R UE pain, ULNT median positive R UE, Transverse and Alar ligaments negative  Neurological Screen:        Sensation: light touch intact B UE  Functional Mobility:         n/a  Balance:          n/a   Body Structures Involved:  1. Joints  2. Muscles Body Functions Affected:  1. Neuromusculoskeletal  2. Movement Related Activities and Participation Affected:  1. General Tasks and Demands  2. Self Care  3. Domestic Life  4. Interpersonal Interactions and Relationships  5. Community, Social and Civic Life   CLINICAL PRESENTATION:   CLINICAL DECISION MAKING:   Outcome Measure: Tool Used: Neck Disability Index (NDI)  Score:  Initial: 14/50  Most Recent: X/50 (Date: -- )   Interpretation of Score: The Neck Disability Index is a revised form of the Oswestry Low Back Pain Index and is designed to measure the activities of daily living in person's with neck pain. Each section is scored on a 0-5 scale, 5 representing the greatest disability. The scores of each section are added together for a total score of 50. Score 0 1-10 11-20 21-30 31-40 41-49 50   Modifier CH CI CJ CK CL CM CN         Medical Necessity:   · Patient is expected to demonstrate progress in range of motion, coordination and functional technique to decrease assistance required with sleeping. Reason for Services/Other Comments:  · Patient has been observed to have reduce ROM before, during or after an intervention.    TREATMENT:   (In addition to Assessment/Re-Assessment sessions the following treatments were rendered)  THERAPEUTIC EXERCISE: (see grid for minutes):  Exercises per grid below to improve mobility and coordination. Required moderate visual, verbal, manual and tactile cues to promote proper body alignment, promote proper body posture and promote proper body mechanics. Progressed resistance, range and complexity of movement as indicated. MANUAL THERAPY: (see grid for minutes): Joint mobilization, Soft tissue mobilization and Manipulation was utilized and necessary because of the patient's restricted joint motion, painful spasm, loss of articular motion and restricted motion of soft tissue. MODALITIES: (see grid for minutes): *  Ultrasound was used today secondary to the patient having tightened structures limiting joint motion that require an increase in extensibility and symptomatic soft tissue calcification. Ultrasound was used today to reduce pain, reduce spasm, reduce joint stiffness and increase muscle flexibility. *  Electrical Stimulation Therapy (IFC) was provided with intensity adjusted throughout treatment to patient tolerance. to reduce pain. *  Cold Pack Therapy in order to provide analgesia and reduce inflammation and edema. *  Hot Pack Therapy in order to relieve muscle spasm. MECHANICAL TRACTION: (see grid for minutes): Traction was used due to the patient's cervical radiculopathy in order to relieve pain in or originating from his spine.     Date: 6/5/17  (visit 1) 6/12/17 (visit 2)      Modalities: 5 min 15 min      IFC and heat B UTs 15 min                      Therapeutic Exercise: 15 mins 20 min      Supine chin tuck 10x x15      Levator stretch bilateral  10 sec hold x 6      Seated UT stretch 10x 10 sec hold x 6 Bilateral      Seated Chin retraction 10x       TB rows with scap retraction Black 3x10 x30      ULNT Median stretch 10x glide x10 glide      Cervical isometrics (all directions)  5 sec hold x 10 ea direction      Proprioceptive Activities:                                Manual Therapy: 15 mins 15 min      STM UTs with PROM rotation bilateral       SOR with distraction bilateral 15 min gentle manual distraction and cervical stretching in all directions      Therapeutic Activities:                                        HEP: Handout provided 6/5/17  Treatment/Session Assessment: Pt reported mild tingling with the upper trap stretch but no increased pain. Continue POC. · Pain/ Symptoms: Initial:   6/10    Pt reports tingling in the R hand. Pt states that he went kayaking this weekend. Post Session:  4-5/10 ·   Compliance with Program/Exercises: Will assess as treatment progresses. · Recommendations/Intent for next treatment session: \"Next visit will focus on advancements to more challenging activities\".   Total Treatment Duration:  PT Patient Time In/Time Out  Time In: 0800  Time Out: [address], PTA

## 2017-06-14 ENCOUNTER — APPOINTMENT (OUTPATIENT)
Dept: PHYSICAL THERAPY | Age: 57
End: 2017-06-14
Payer: COMMERCIAL

## 2017-06-16 ENCOUNTER — HOSPITAL ENCOUNTER (OUTPATIENT)
Dept: PHYSICAL THERAPY | Age: 57
Discharge: HOME | End: 2017-06-16
Payer: COMMERCIAL

## 2017-06-16 PROCEDURE — 97140 MANUAL THERAPY 1/> REGIONS: CPT

## 2017-06-16 PROCEDURE — 97014 ELECTRIC STIMULATION THERAPY: CPT

## 2017-06-16 PROCEDURE — 97110 THERAPEUTIC EXERCISES: CPT

## 2017-06-16 NOTE — PROGRESS NOTES
Pita Up .  : 1960 [address]  [address] [place] [address]  [phone]   [phone]        OUTPATIENT PHYSICAL THERAPY:Daily Note 2017        ICD-10: Treatment Diagnosis: Shoulder lesion, unspecified, right shoulder (M75.91)  Precautions/Allergies:   Bactrim [sulfamethoxazole-trimethoprim]   Fall Risk Score: 1 (? 5 = High Risk)  MD Orders: Eval and treat MEDICAL/REFERRING DIAGNOSIS:  Right shoulder pain [M25.511]   DATE OF ONSET:   REFERRING PHYSICIAN: Kimmy Rodriguez MD  RETURN PHYSICIAN APPOINTMENT: TBD     INITIAL ASSESSMENT:  Mr. George Reyes presents with cervical radiculopathy pain, upper cervical facet joint dysfunction, and right upper trapezius muscle strain. He will benefit from PT services to re mediate impairments. He has no red flags present at time of evaluation and is appropriate for PT services. He has a history of successful cervical spine fusions and has not had current imaging since he has onset of new symptoms. If he does not improve with PT services in the next 4 weeks recommend consult with orthopedic to rule out complications of his cervical spine fusions. PROBLEM LIST (Impacting functional limitations):  1. Decreased Strength  2. Decreased ADL/Functional Activities  3. Increased Pain  4. Decreased Activity Tolerance  5. Decreased Flexibility/Joint Mobility  6. Poor posture INTERVENTIONS PLANNED:  1. Cold  2. Cryotherapy  3. Electrical Stimulation  4. Heat  5. Home Exercise Program (HEP)  6. Manual Therapy  7. Neuromuscular Re-education/Strengthening  8. Range of Motion (ROM)  9. Therapeutic Activites  10. Therapeutic Exercise/Strengthening  11. Dry Needling, Mechanical Traction   TREATMENT PLAN:  Effective Dates: 17 TO 17. Frequency/Duration: 2 times a week for 12 weeks  GOALS: (Goals have been discussed and agreed upon with patient.)  Short-Term Functional Goals: Time Frame: 2 weeks  1.  Patient will be independent with HEP without pain. 2. Patient will report that sleep is improved to > 5 hours without waking up due to numbness and tingling. Discharge Goals: Time Frame: 12 weeks  1. Patient will have NDI score < 5 points allowing him improved community participation,  2. Patient will have C/S AROM improved to 80 deg rotation with centralization of symptoms so he can look over shoulder safely. 3. Patient will have posture WNL to reduce neck pain and centralize symptoms. Rehabilitation Potential For Stated Goals: Good                     HISTORY:   History of Present Injury/Illness (Reason for Referral):  65 y/o M with complain of right upper trapezius pain and right hand numbness that is provoked with overhead movement. He wakes up when sleeping with numbness in hand. Pain is 9/10 at worst everyday constant. He has not had imaging. He has history of two C5-7 fusion 2002 and 2012. His first surgery was not successful and he had surgery again 10 years later. Before his neck surgery he reported  weakness in hand. He says his new symptoms are different. Past Medical History/Comorbidities:   Mr. Angie Szymanski  has a past medical history of Chest pain (8/11/2016); Chronic cellulitis; Diabetes ([facility] [address] History of kidney stones; Hypertension; Obesity; and Unspecified adverse effect of anesthesia (1997). He also has no past medical history of Coagulation defects; COPD; DEMENTIA; Gastrointestinal disorder; Infectious disease; or Neurological disorder. Mr. Angie Szymanski  has a past surgical history that includes urological (2002); cervical fusion; cervical fusion (6-20-12); hernia repair; sinus surgery proc unlisted (1997); orthopaedic (Right); mohs procedure (Right); knee arthroscopy (Right, 2012); and knee arthroscopy (Left, 2013).   Social History/Living Environment:    Lives with wife  Prior Level of Function/Work/Activity:      Independent, 50 hour/week work restriction due to health   Previous Treatment Approaches: PT Jan 2017 that reduced symptoms but he had a dysruption in insurance coverage and he couldn't come anymore. He is not doing his HEP. Current Medications:    Current Outpatient Prescriptions:     BENICAR HCT 40-12.5 mg per tablet, TAKE 1 TABLET BY MOUTH EVERY DAY, Disp: 90 Tab, Rfl: 1    methocarbamol (ROBAXIN) 500 mg tablet, Take 1 Tab by mouth nightly as needed. , Disp: 20 Tab, Rfl: 0    metFORMIN (GLUCOPHAGE) 1,000 mg tablet, TAKE 1 TAB BY MOUTH TWO (2) TIMES DAILY (WITH MEALS). , Disp: 180 Tab, Rfl: 1    valACYclovir (VALTREX) 1 gram tablet, TAKE 1 TAB BY MOUTH DAILY. , Disp: 30 Tab, Rfl: 7    glimepiride (AMARYL) 2 mg tablet, TAKE 1 TABLET BY MOUTH TWO (2) TIMES A DAY., Disp: 90 Tab, Rfl: 1    tadalafil (CIALIS) 5 mg tablet, Take 1 Tab by mouth daily. , Disp: 30 Tab, Rfl: 2    cefPROZIL (CEFZIL) 250 mg tablet, Take 1 Tab by mouth two (2) times a day., Disp: 20 Tab, Rfl: 0    methylPREDNISolone (MEDROL, DIEGO,) 4 mg tablet, Take 1 Tab by mouth Specific Days and Specific Times. , Disp: 1 Dose Pack, Rfl: 0    ketorolac (TORADOL) 10 mg tablet, Take 1 Tab by mouth three (3) times daily. , Disp: 15 Tab, Rfl: 0    MAGNESIUM PO, Take  by mouth daily. , Disp: , Rfl:     multivitamin (ONE A DAY) tablet, Take 1 Tab by mouth daily. , Disp: , Rfl:     aspirin 81 mg chewable tablet, Take 1 Tab by mouth daily. , Disp: 30 Tab, Rfl: 0    albuterol (PROVENTIL HFA, VENTOLIN HFA, PROAIR HFA) 90 mcg/actuation inhaler, Take 2 Puffs by inhalation every four (4) hours as needed.  Indications: ACUTE ASTHMA ATTACK, Disp: 3 Inhaler, Rfl: 1    albuterol (PROVENTIL VENTOLIN) 2.5 mg /3 mL (0.083 %) nebulizer solution, 3 mL by Nebulization route every four (4) hours as needed for Wheezing., Disp: 24 Each, Rfl: 5    fluticasone-vilanterol (BREO ELLIPTA) 200-25 mcg/dose inhaler, 1 inhalation daily, rinse mouth after use, Disp: 1 Inhaler, Rfl: 11    cpap machine kit, , Disp: , Rfl:     montelukast (SINGULAIR) 10 mg tablet, Take 10 mg by mouth daily. , Disp: , Rfl:    Date Last Reviewed:  6/16/2017   EXAMINATION:   Observation/Orthostatic Postural Assessment: Forward head  Palpation:          Increased pain and tone right UT, L SO, R 1st rib hypomobile and painful, L to R C/S C2-5 hypomobile and painful   ROM:     C/S AROM  Left rotation 65 deg (reproduces left arm n/t  Right rotation 70 deg  Flexion 25 deg  Extension 25 deg  Right SB 30 deg  Left SB 30 deg      Strength:     n/t      Special Tests:          Spurling's positive for R UE pain, ULNT median positive R UE, Transverse and Alar ligaments negative  Neurological Screen:        Sensation: light touch intact B UE  Functional Mobility:         n/a  Balance:          n/a   Body Structures Involved:  1. Joints  2. Muscles Body Functions Affected:  1. Neuromusculoskeletal  2. Movement Related Activities and Participation Affected:  1. General Tasks and Demands  2. Self Care  3. Domestic Life  4. Interpersonal Interactions and Relationships  5. Community, Social and Civic Life   CLINICAL PRESENTATION:   CLINICAL DECISION MAKING:   Outcome Measure: Tool Used: Neck Disability Index (NDI)  Score:  Initial: 14/50  Most Recent: X/50 (Date: -- )   Interpretation of Score: The Neck Disability Index is a revised form of the Oswestry Low Back Pain Index and is designed to measure the activities of daily living in person's with neck pain. Each section is scored on a 0-5 scale, 5 representing the greatest disability. The scores of each section are added together for a total score of 50. Score 0 1-10 11-20 21-30 31-40 41-49 50   Modifier CH CI CJ CK CL CM CN         Medical Necessity:   · Patient is expected to demonstrate progress in range of motion, coordination and functional technique to decrease assistance required with sleeping. Reason for Services/Other Comments:  · Patient has been observed to have reduce ROM before, during or after an intervention.    TREATMENT:   (In addition to Assessment/Re-Assessment sessions the following treatments were rendered)  THERAPEUTIC EXERCISE: (see grid for minutes):  Exercises per grid below to improve mobility and coordination. Required moderate visual, verbal, manual and tactile cues to promote proper body alignment, promote proper body posture and promote proper body mechanics. Progressed resistance, range and complexity of movement as indicated. MANUAL THERAPY: (see grid for minutes): Joint mobilization, Soft tissue mobilization and Manipulation was utilized and necessary because of the patient's restricted joint motion, painful spasm, loss of articular motion and restricted motion of soft tissue. MODALITIES: (see grid for minutes): *  Ultrasound was used today secondary to the patient having tightened structures limiting joint motion that require an increase in extensibility and symptomatic soft tissue calcification. Ultrasound was used today to reduce pain, reduce spasm, reduce joint stiffness and increase muscle flexibility. *  Electrical Stimulation Therapy (IFC) was provided with intensity adjusted throughout treatment to patient tolerance. to reduce pain. *  Cold Pack Therapy in order to provide analgesia and reduce inflammation and edema. *  Hot Pack Therapy in order to relieve muscle spasm. MECHANICAL TRACTION: (see grid for minutes): Traction was used due to the patient's cervical radiculopathy in order to relieve pain in or originating from his spine.     Date: 6/5/17  (visit 1) 6/12/17 (visit 2) 6/16/17 (visit 3)     Modalities: 5 min 15 min 15 min     IFC and heat B UTs 15 min 15 min                     Therapeutic Exercise: 15 mins 20 min 25 min     Supine chin tuck 10x x15      Levator stretch bilateral  10 sec hold x 6 10 sec hold x 6 B     Seated UT stretch 10x 10 sec hold x 6 Bilateral 10 sec hold x6 bilateral     Seated Chin retraction 10x       TB rows with scap retraction Black 3x10 x30      ULNT Median stretch 10x glide x10 glide x10 glide B UE     Pulleys (flexion and scaption)   x2 min ea     Cervical isometrics (all directions)  5 sec hold x 10 ea direction repeat     Proprioceptive Activities:                                Manual Therapy: 15 mins 15 min 15 min     STM UTs with PROM rotation bilateral       SOR with distraction bilateral 15 min gentle manual distraction and cervical stretching in all directions 15 min PROM and cervical distraction     Therapeutic Activities:                                        HEP: Handout provided 6/5/17  Treatment/Session Assessment: Pt was 5 min late. Pt did not report increased pain with exercise. Continue POC. · Pain/ Symptoms: Initial:   0/10 but pt reports tingling when trying to sleep     Post Session:   ·   Compliance with Program/Exercises: Will assess as treatment progresses. · Recommendations/Intent for next treatment session: \"Next visit will focus on advancements to more challenging activities\".   Total Treatment Duration:  PT Patient Time In/Time Out  Time In: 0935  Time Out: [address], PTA

## 2017-06-21 ENCOUNTER — APPOINTMENT (OUTPATIENT)
Dept: PHYSICAL THERAPY | Age: 57
End: 2017-06-21
Payer: COMMERCIAL

## 2017-06-22 NOTE — PROGRESS NOTES
Hasmukh Dunne .  : 1960 [id number] [facility],[address]  [address] [place] [address].  [phone]   [phone]        OUTPATIENT PHYSICAL THERAPY:Daily Note 2017        ICD-10: Treatment Diagnosis: Shoulder lesion, unspecified, right shoulder (M75.91)  Precautions/Allergies:   Bactrim [sulfamethoxazole-trimethoprim]   Fall Risk Score: 1 (? 5 = High Risk)  MD Orders: Eval and treat MEDICAL/REFERRING DIAGNOSIS:  Right shoulder pain [M25.511]   DATE OF ONSET:   REFERRING PHYSICIAN: Margarita Juarez MD  RETURN PHYSICIAN APPOINTMENT: TBD     INITIAL ASSESSMENT:  Mr. Merline Mann presents with cervical radiculopathy pain, upper cervical facet joint dysfunction, and right upper trapezius muscle strain. He will benefit from PT services to re mediate impairments. He has no red flags present at time of evaluation and is appropriate for PT services. He has a history of successful cervical spine fusions and has not had current imaging since he has onset of new symptoms. If he does not improve with PT services in the next 4 weeks recommend consult with orthopedic to rule out complications of his cervical spine fusions. PROBLEM LIST (Impacting functional limitations):  1. Decreased Strength  2. Decreased ADL/Functional Activities  3. Increased Pain  4. Decreased Activity Tolerance  5. Decreased Flexibility/Joint Mobility  6. Poor posture INTERVENTIONS PLANNED:  1. Cold  2. Cryotherapy  3. Electrical Stimulation  4. Heat  5. Home Exercise Program (HEP)  6. Manual Therapy  7. Neuromuscular Re-education/Strengthening  8. Range of Motion (ROM)  9. Therapeutic Activites  10. Therapeutic Exercise/Strengthening  11. Dry Needling, Mechanical Traction   TREATMENT PLAN:  Effective Dates: 17 TO 17. Frequency/Duration: 2 times a week for 12 weeks  GOALS: (Goals have been discussed and agreed upon with patient.)  Short-Term Functional Goals: Time Frame: 2 weeks  1.  Patient will be independent with HEP without pain. 2. Patient will report that sleep is improved to > 5 hours without waking up due to numbness and tingling. Discharge Goals: Time Frame: 12 weeks  1. Patient will have NDI score < 5 points allowing him improved community participation,  2. Patient will have C/S AROM improved to 80 deg rotation with centralization of symptoms so he can look over shoulder safely. 3. Patient will have posture WNL to reduce neck pain and centralize symptoms. Rehabilitation Potential For Stated Goals: Good                     HISTORY:   History of Present Injury/Illness (Reason for Referral):  65 y/o M with complain of right upper trapezius pain and right hand numbness that is provoked with overhead movement. He wakes up when sleeping with numbness in hand. Pain is 9/10 at worst everyday constant. He has not had imaging. He has history of two C5-7 fusion 2002 and 2012. His first surgery was not successful and he had surgery again 10 years later. Before his neck surgery he reported  weakness in hand. He says his new symptoms are different. Past Medical History/Comorbidities:   Mr. Starr Veras  has a past medical history of Chest pain (8/11/2016); Chronic cellulitis; Diabetes ([facility] [address] History of kidney stones; Hypertension; Obesity; and Unspecified adverse effect of anesthesia (1997). He also has no past medical history of Coagulation defects; COPD; DEMENTIA; Gastrointestinal disorder; Infectious disease; or Neurological disorder. Mr. Starr Veras  has a past surgical history that includes urological (2002); cervical fusion; cervical fusion (6-20-12); hernia repair; sinus surgery proc unlisted (1997); orthopaedic (Right); mohs procedure (Right); knee arthroscopy (Right, 2012); and knee arthroscopy (Left, 2013).   Social History/Living Environment:    Lives with wife  Prior Level of Function/Work/Activity:      Independent, 50 hour/week work restriction due to health   Previous Treatment Approaches: PT Jan 2017 that reduced symptoms but he had a dysruption in insurance coverage and he couldn't come anymore. He is not doing his HEP. Current Medications:    Current Outpatient Prescriptions:     BENICAR HCT 40-12.5 mg per tablet, TAKE 1 TABLET BY MOUTH EVERY DAY, Disp: 90 Tab, Rfl: 1    methocarbamol (ROBAXIN) 500 mg tablet, Take 1 Tab by mouth nightly as needed. , Disp: 20 Tab, Rfl: 0    metFORMIN (GLUCOPHAGE) 1,000 mg tablet, TAKE 1 TAB BY MOUTH TWO (2) TIMES DAILY (WITH MEALS). , Disp: 180 Tab, Rfl: 1    valACYclovir (VALTREX) 1 gram tablet, TAKE 1 TAB BY MOUTH DAILY. , Disp: 30 Tab, Rfl: 7    glimepiride (AMARYL) 2 mg tablet, TAKE 1 TABLET BY MOUTH TWO (2) TIMES A DAY., Disp: 90 Tab, Rfl: 1    tadalafil (CIALIS) 5 mg tablet, Take 1 Tab by mouth daily. , Disp: 30 Tab, Rfl: 2    cefPROZIL (CEFZIL) 250 mg tablet, Take 1 Tab by mouth two (2) times a day., Disp: 20 Tab, Rfl: 0    methylPREDNISolone (MEDROL, DIEGO,) 4 mg tablet, Take 1 Tab by mouth Specific Days and Specific Times. , Disp: 1 Dose Pack, Rfl: 0    ketorolac (TORADOL) 10 mg tablet, Take 1 Tab by mouth three (3) times daily. , Disp: 15 Tab, Rfl: 0    MAGNESIUM PO, Take  by mouth daily. , Disp: , Rfl:     multivitamin (ONE A DAY) tablet, Take 1 Tab by mouth daily. , Disp: , Rfl:     aspirin 81 mg chewable tablet, Take 1 Tab by mouth daily. , Disp: 30 Tab, Rfl: 0    albuterol (PROVENTIL HFA, VENTOLIN HFA, PROAIR HFA) 90 mcg/actuation inhaler, Take 2 Puffs by inhalation every four (4) hours as needed.  Indications: ACUTE ASTHMA ATTACK, Disp: 3 Inhaler, Rfl: 1    albuterol (PROVENTIL VENTOLIN) 2.5 mg /3 mL (0.083 %) nebulizer solution, 3 mL by Nebulization route every four (4) hours as needed for Wheezing., Disp: 24 Each, Rfl: 5    fluticasone-vilanterol (BREO ELLIPTA) 200-25 mcg/dose inhaler, 1 inhalation daily, rinse mouth after use, Disp: 1 Inhaler, Rfl: 11    cpap machine kit, , Disp: , Rfl:     montelukast (SINGULAIR) 10 mg tablet, Take 10 mg by mouth daily. , Disp: , Rfl:    Date Last Reviewed:  6/23/2017   EXAMINATION:   Observation/Orthostatic Postural Assessment: Forward head  Palpation:          Increased pain and tone right UT, L SO, R 1st rib hypomobile and painful, L to R C/S C2-5 hypomobile and painful   ROM:     C/S AROM  Left rotation 65 deg (reproduces left arm n/t  Right rotation 70 deg  Flexion 25 deg  Extension 25 deg  Right SB 30 deg  Left SB 30 deg      Strength:     n/t      Special Tests:          Spurling's positive for R UE pain, ULNT median positive R UE, Transverse and Alar ligaments negative  Neurological Screen:        Sensation: light touch intact B UE  Functional Mobility:         n/a  Balance:          n/a   Body Structures Involved:  1. Joints  2. Muscles Body Functions Affected:  1. Neuromusculoskeletal  2. Movement Related Activities and Participation Affected:  1. General Tasks and Demands  2. Self Care  3. Domestic Life  4. Interpersonal Interactions and Relationships  5. Community, Social and Civic Life   CLINICAL PRESENTATION:   CLINICAL DECISION MAKING:   Outcome Measure: Tool Used: Neck Disability Index (NDI)  Score:  Initial: 14/50  Most Recent: X/50 (Date: -- )   Interpretation of Score: The Neck Disability Index is a revised form of the Oswestry Low Back Pain Index and is designed to measure the activities of daily living in person's with neck pain. Each section is scored on a 0-5 scale, 5 representing the greatest disability. The scores of each section are added together for a total score of 50. Score 0 1-10 11-20 21-30 31-40 41-49 50   Modifier CH CI CJ CK CL CM CN         Medical Necessity:   · Patient is expected to demonstrate progress in range of motion, coordination and functional technique to decrease assistance required with sleeping. Reason for Services/Other Comments:  · Patient has been observed to have reduce ROM before, during or after an intervention.    TREATMENT:   (In addition to Assessment/Re-Assessment sessions the following treatments were rendered)  THERAPEUTIC EXERCISE: (see grid for minutes):  Exercises per grid below to improve mobility and coordination. Required moderate visual, verbal, manual and tactile cues to promote proper body alignment, promote proper body posture and promote proper body mechanics. Progressed resistance, range and complexity of movement as indicated. MANUAL THERAPY: (see grid for minutes): Joint mobilization, Soft tissue mobilization and Manipulation was utilized and necessary because of the patient's restricted joint motion, painful spasm, loss of articular motion and restricted motion of soft tissue. MODALITIES: (see grid for minutes): *  Ultrasound was used today secondary to the patient having tightened structures limiting joint motion that require an increase in extensibility and symptomatic soft tissue calcification. Ultrasound was used today to reduce pain, reduce spasm, reduce joint stiffness and increase muscle flexibility. *  Electrical Stimulation Therapy (IFC) was provided with intensity adjusted throughout treatment to patient tolerance. to reduce pain. *  Cold Pack Therapy in order to provide analgesia and reduce inflammation and edema. *  Hot Pack Therapy in order to relieve muscle spasm. MECHANICAL TRACTION: (see grid for minutes): Traction was used due to the patient's cervical radiculopathy in order to relieve pain in or originating from his spine.     Date: 6/5/17  (visit 1) 6/12/17 (visit 2) 6/16/17 (visit 3) 6/23/17  (visit 4)    Modalities: 5 min 15 min 15 min 15 min    IFC and heat B UTs 15 min 15 min 15 min                    Therapeutic Exercise: 15 mins 20 min 25 min 25 mins    UBE    3' each direction    Supine chin tuck 10x x15  10x     Levator stretch bilateral  10 sec hold x 6 10 sec hold x 6 B     Seated UT stretch 10x 10 sec hold x 6 Bilateral 10 sec hold x6 bilateral 3x30 sec holds B    Seated Chin retraction 10x       TB rows with scap retraction Black 3x10 x30  30x PDs and rows 4 pl    ULNT Median stretch 10x glide x10 glide x10 glide B UE 10x B    Pulleys (flexion and scaption)   x2 min ea SB I and T  10x5\" holds    Cervical isometrics (all directions)  5 sec hold x 10 ea direction repeat Ball/wall series 10x5\" retraction 10x rotations    Proprioceptive Activities:                                Manual Therapy: 15 mins 15 min 15 min 15 min    STM UTs with PROM rotation bilateral       SOR with distraction bilateral 15 min gentle manual distraction and cervical stretching in all directions 15 min PROM and cervical distraction Repeat supine    Therapeutic Activities:                                        HEP: Handout provided 6/5/17  Treatment/Session Assessment:He is progressing well with centralization of symptoms. Continue POC. · Pain/ Symptoms: Initial:   0/10  \"I haven't had tingling in a few days\"     Post Session:  0/10 ·   Compliance with Program/Exercises: Will assess as treatment progresses. · Recommendations/Intent for next treatment session: \"Next visit will focus on advancements to more challenging activities\".   Total Treatment Duration:  PT Patient Time In/Time Out  Time In: 0329  Time Out: [address], PT

## 2017-06-23 ENCOUNTER — HOSPITAL ENCOUNTER (OUTPATIENT)
Dept: PHYSICAL THERAPY | Age: 57
Discharge: HOME | End: 2017-06-23
Payer: COMMERCIAL

## 2017-06-23 PROCEDURE — 97140 MANUAL THERAPY 1/> REGIONS: CPT

## 2017-06-23 PROCEDURE — 97014 ELECTRIC STIMULATION THERAPY: CPT

## 2017-06-23 PROCEDURE — 97110 THERAPEUTIC EXERCISES: CPT

## 2017-06-26 ENCOUNTER — HOSPITAL ENCOUNTER (OUTPATIENT)
Dept: PHYSICAL THERAPY | Age: 57
Discharge: HOME | End: 2017-06-26
Payer: COMMERCIAL

## 2017-06-26 PROCEDURE — 97140 MANUAL THERAPY 1/> REGIONS: CPT

## 2017-06-26 PROCEDURE — 97110 THERAPEUTIC EXERCISES: CPT

## 2017-06-26 PROCEDURE — 97014 ELECTRIC STIMULATION THERAPY: CPT

## 2017-06-26 NOTE — PROGRESS NOTES
León Galicia .  : 1960 [address] [facility],[address]  [address] [place] [address].  [phone]   [phone]        OUTPATIENT PHYSICAL THERAPY:Daily Note 2017        ICD-10: Treatment Diagnosis: Shoulder lesion, unspecified, right shoulder (M75.91)  Precautions/Allergies:   Bactrim [sulfamethoxazole-trimethoprim]   Fall Risk Score: 1 (? 5 = High Risk)  MD Orders: Eval and treat MEDICAL/REFERRING DIAGNOSIS:  Right shoulder pain [M25.511]   DATE OF ONSET:   REFERRING PHYSICIAN: Chandni Seymour MD  RETURN PHYSICIAN APPOINTMENT: TBD     INITIAL ASSESSMENT:  Mr. Alison Eldridge presents with cervical radiculopathy pain, upper cervical facet joint dysfunction, and right upper trapezius muscle strain. He will benefit from PT services to re mediate impairments. He has no red flags present at time of evaluation and is appropriate for PT services. He has a history of successful cervical spine fusions and has not had current imaging since he has onset of new symptoms. If he does not improve with PT services in the next 4 weeks recommend consult with orthopedic to rule out complications of his cervical spine fusions. PROBLEM LIST (Impacting functional limitations):  1. Decreased Strength  2. Decreased ADL/Functional Activities  3. Increased Pain  4. Decreased Activity Tolerance  5. Decreased Flexibility/Joint Mobility  6. Poor posture INTERVENTIONS PLANNED:  1. Cold  2. Cryotherapy  3. Electrical Stimulation  4. Heat  5. Home Exercise Program (HEP)  6. Manual Therapy  7. Neuromuscular Re-education/Strengthening  8. Range of Motion (ROM)  9. Therapeutic Activites  10. Therapeutic Exercise/Strengthening  11. Dry Needling, Mechanical Traction   TREATMENT PLAN:  Effective Dates: 17 TO 17. Frequency/Duration: 2 times a week for 12 weeks  GOALS: (Goals have been discussed and agreed upon with patient.)  Short-Term Functional Goals: Time Frame: 2 weeks  1.  Patient will be independent with HEP without pain. 2. Patient will report that sleep is improved to > 5 hours without waking up due to numbness and tingling. Discharge Goals: Time Frame: 12 weeks  1. Patient will have NDI score < 5 points allowing him improved community participation,  2. Patient will have C/S AROM improved to 80 deg rotation with centralization of symptoms so he can look over shoulder safely. 3. Patient will have posture WNL to reduce neck pain and centralize symptoms. Rehabilitation Potential For Stated Goals: Good                     HISTORY:   History of Present Injury/Illness (Reason for Referral):  63 y/o M with complain of right upper trapezius pain and right hand numbness that is provoked with overhead movement. He wakes up when sleeping with numbness in hand. Pain is 9/10 at worst everyday constant. He has not had imaging. He has history of two C5-7 fusion 2002 and 2012. His first surgery was not successful and he had surgery again 10 years later. Before his neck surgery he reported  weakness in hand. He says his new symptoms are different. Past Medical History/Comorbidities:   Mr. Fatimah Womack  has a past medical history of Chest pain (8/11/2016); Chronic cellulitis; Diabetes ([facility] [address] History of kidney stones; Hypertension; Obesity; and Unspecified adverse effect of anesthesia (1997). He also has no past medical history of Coagulation defects; COPD; DEMENTIA; Gastrointestinal disorder; Infectious disease; or Neurological disorder. Mr. Fatimah Womack  has a past surgical history that includes urological (2002); cervical fusion; cervical fusion (6-20-12); hernia repair; sinus surgery proc unlisted (1997); orthopaedic (Right); mohs procedure (Right); knee arthroscopy (Right, 2012); and knee arthroscopy (Left, 2013).   Social History/Living Environment:    Lives with wife  Prior Level of Function/Work/Activity:      Independent, 50 hour/week work restriction due to health   Previous Treatment Approaches: PT Jan 2017 that reduced symptoms but he had a dysruption in insurance coverage and he couldn't come anymore. He is not doing his HEP. Current Medications:    Current Outpatient Prescriptions:     BENICAR HCT 40-12.5 mg per tablet, TAKE 1 TABLET BY MOUTH EVERY DAY, Disp: 90 Tab, Rfl: 1    methocarbamol (ROBAXIN) 500 mg tablet, Take 1 Tab by mouth nightly as needed. , Disp: 20 Tab, Rfl: 0    metFORMIN (GLUCOPHAGE) 1,000 mg tablet, TAKE 1 TAB BY MOUTH TWO (2) TIMES DAILY (WITH MEALS). , Disp: 180 Tab, Rfl: 1    valACYclovir (VALTREX) 1 gram tablet, TAKE 1 TAB BY MOUTH DAILY. , Disp: 30 Tab, Rfl: 7    glimepiride (AMARYL) 2 mg tablet, TAKE 1 TABLET BY MOUTH TWO (2) TIMES A DAY., Disp: 90 Tab, Rfl: 1    tadalafil (CIALIS) 5 mg tablet, Take 1 Tab by mouth daily. , Disp: 30 Tab, Rfl: 2    cefPROZIL (CEFZIL) 250 mg tablet, Take 1 Tab by mouth two (2) times a day., Disp: 20 Tab, Rfl: 0    methylPREDNISolone (MEDROL, DIEGO,) 4 mg tablet, Take 1 Tab by mouth Specific Days and Specific Times. , Disp: 1 Dose Pack, Rfl: 0    ketorolac (TORADOL) 10 mg tablet, Take 1 Tab by mouth three (3) times daily. , Disp: 15 Tab, Rfl: 0    MAGNESIUM PO, Take  by mouth daily. , Disp: , Rfl:     multivitamin (ONE A DAY) tablet, Take 1 Tab by mouth daily. , Disp: , Rfl:     aspirin 81 mg chewable tablet, Take 1 Tab by mouth daily. , Disp: 30 Tab, Rfl: 0    albuterol (PROVENTIL HFA, VENTOLIN HFA, PROAIR HFA) 90 mcg/actuation inhaler, Take 2 Puffs by inhalation every four (4) hours as needed.  Indications: ACUTE ASTHMA ATTACK, Disp: 3 Inhaler, Rfl: 1    albuterol (PROVENTIL VENTOLIN) 2.5 mg /3 mL (0.083 %) nebulizer solution, 3 mL by Nebulization route every four (4) hours as needed for Wheezing., Disp: 24 Each, Rfl: 5    fluticasone-vilanterol (BREO ELLIPTA) 200-25 mcg/dose inhaler, 1 inhalation daily, rinse mouth after use, Disp: 1 Inhaler, Rfl: 11    cpap machine kit, , Disp: , Rfl:     montelukast (SINGULAIR) 10 mg tablet, Take 10 mg by mouth daily. , Disp: , Rfl:    Date Last Reviewed:  6/26/2017   EXAMINATION:   Observation/Orthostatic Postural Assessment: Forward head  Palpation:          Increased pain and tone right UT, L SO, R 1st rib hypomobile and painful, L to R C/S C2-5 hypomobile and painful   ROM:     C/S AROM  Left rotation 65 deg (reproduces left arm n/t  Right rotation 70 deg  Flexion 25 deg  Extension 25 deg  Right SB 30 deg  Left SB 30 deg      Strength:     n/t      Special Tests:          Spurling's positive for R UE pain, ULNT median positive R UE, Transverse and Alar ligaments negative  Neurological Screen:        Sensation: light touch intact B UE  Functional Mobility:         n/a  Balance:          n/a   Body Structures Involved:  1. Joints  2. Muscles Body Functions Affected:  1. Neuromusculoskeletal  2. Movement Related Activities and Participation Affected:  1. General Tasks and Demands  2. Self Care  3. Domestic Life  4. Interpersonal Interactions and Relationships  5. Community, Social and Civic Life   CLINICAL PRESENTATION:   CLINICAL DECISION MAKING:   Outcome Measure: Tool Used: Neck Disability Index (NDI)  Score:  Initial: 14/50  Most Recent: X/50 (Date: -- )   Interpretation of Score: The Neck Disability Index is a revised form of the Oswestry Low Back Pain Index and is designed to measure the activities of daily living in person's with neck pain. Each section is scored on a 0-5 scale, 5 representing the greatest disability. The scores of each section are added together for a total score of 50. Score 0 1-10 11-20 21-30 31-40 41-49 50   Modifier CH CI CJ CK CL CM CN         Medical Necessity:   · Patient is expected to demonstrate progress in range of motion, coordination and functional technique to decrease assistance required with sleeping. Reason for Services/Other Comments:  · Patient has been observed to have reduce ROM before, during or after an intervention.    TREATMENT:   (In addition to Assessment/Re-Assessment sessions the following treatments were rendered)  THERAPEUTIC EXERCISE: (see grid for minutes):  Exercises per grid below to improve mobility and coordination. Required moderate visual, verbal, manual and tactile cues to promote proper body alignment, promote proper body posture and promote proper body mechanics. Progressed resistance, range and complexity of movement as indicated. MANUAL THERAPY: (see grid for minutes): Joint mobilization, Soft tissue mobilization and Manipulation was utilized and necessary because of the patient's restricted joint motion, painful spasm, loss of articular motion and restricted motion of soft tissue. MODALITIES: (see grid for minutes): *  Ultrasound was used today secondary to the patient having tightened structures limiting joint motion that require an increase in extensibility and symptomatic soft tissue calcification. Ultrasound was used today to reduce pain, reduce spasm, reduce joint stiffness and increase muscle flexibility. *  Electrical Stimulation Therapy (IFC) was provided with intensity adjusted throughout treatment to patient tolerance. to reduce pain. *  Cold Pack Therapy in order to provide analgesia and reduce inflammation and edema. *  Hot Pack Therapy in order to relieve muscle spasm. MECHANICAL TRACTION: (see grid for minutes): Traction was used due to the patient's cervical radiculopathy in order to relieve pain in or originating from his spine.     Date: 6/5/17  (visit 1) 6/12/17 (visit 2) 6/16/17 (visit 3) 6/23/17  (visit 4) 6/26/17 (visit 5)   Modalities: 5 min 15 min 15 min 15 min 15 min   IFC and heat B UTs 15 min 15 min 15 min 15 min                   Therapeutic Exercise: 15 mins 20 min 25 min 25 mins 30 min   UBE    3' each direction 6 min L3 forward/reverse   Supine chin tuck 10x x15  10x     Levator stretch bilateral  10 sec hold x 6 10 sec hold x 6 B     Seated UT stretch 10x 10 sec hold x 6 Bilateral 10 sec hold x6 bilateral 3x30 sec holds B    Seated Chin retraction 10x       TB rows with scap retraction Black 3x10 x30  30x PDs and rows 4 pl 3x10 PD's and scapula retraction, black   ULNT Median stretch 10x glide x10 glide x10 glide B UE 10x B    Pulleys (flexion and scaption)   x2 min ea SB I and T  10x5\" holds SB Y's, T's and I's 5 sec hold x 10   Cervical isometrics (all directions)  5 sec hold x 10 ea direction repeat Ball/wall series 10x5\" retraction 10x rotations repeat   Proprioceptive Activities:                                Manual Therapy: 15 mins 15 min 15 min 15 min 15 min   STM UTs with PROM rotation bilateral       SOR with distraction bilateral 15 min gentle manual distraction and cervical stretching in all directions 15 min PROM and cervical distraction Repeat supine 15 min cervical distraction and PROM   Therapeutic Activities:                                        HEP: Handout provided 6/5/17  Treatment/Session Assessment: Pt reports no pain or tingling during therex. Continue POC. · Pain/ Symptoms: Initial:   0/10     No reports of pain or tingling. Post Session:  0/10 ·   Compliance with Program/Exercises: Will assess as treatment progresses. · Recommendations/Intent for next treatment session: \"Next visit will focus on advancements to more challenging activities\".   Total Treatment Duration:  PT Patient Time In/Time Out  Time In: 0155  Time Out: [address], [facility]

## 2017-06-28 ENCOUNTER — HOSPITAL ENCOUNTER (OUTPATIENT)
Dept: PHYSICAL THERAPY | Age: 57
Discharge: HOME | End: 2017-06-28
Payer: COMMERCIAL

## 2017-06-28 PROCEDURE — 97140 MANUAL THERAPY 1/> REGIONS: CPT

## 2017-06-28 PROCEDURE — 97110 THERAPEUTIC EXERCISES: CPT

## 2017-06-28 PROCEDURE — 97014 ELECTRIC STIMULATION THERAPY: CPT

## 2017-06-28 NOTE — PROGRESS NOTES
Rina Brantley .  : 1960 [address] [address].  [phone]   [phone]        OUTPATIENT PHYSICAL THERAPY:Daily Note 2017        ICD-10: Treatment Diagnosis: Shoulder lesion, unspecified, right shoulder (M75.91)  Precautions/Allergies:   Bactrim [sulfamethoxazole-trimethoprim]   Fall Risk Score: 1 (? 5 = High Risk)  MD Orders: Eval and treat MEDICAL/REFERRING DIAGNOSIS:  Right shoulder pain [M25.511]   DATE OF ONSET:   REFERRING PHYSICIAN: Alexis Huston MD  RETURN PHYSICIAN APPOINTMENT: TBD     INITIAL ASSESSMENT:  Mr. Lenard Mcintosh presents with cervical radiculopathy pain, upper cervical facet joint dysfunction, and right upper trapezius muscle strain. He will benefit from PT services to re mediate impairments. He has no red flags present at time of evaluation and is appropriate for PT services. He has a history of successful cervical spine fusions and has not had current imaging since he has onset of new symptoms. If he does not improve with PT services in the next 4 weeks recommend consult with orthopedic to rule out complications of his cervical spine fusions. PROBLEM LIST (Impacting functional limitations):  1. Decreased Strength  2. Decreased ADL/Functional Activities  3. Increased Pain  4. Decreased Activity Tolerance  5. Decreased Flexibility/Joint Mobility  6. Poor posture INTERVENTIONS PLANNED:  1. Cold  2. Cryotherapy  3. Electrical Stimulation  4. Heat  5. Home Exercise Program (HEP)  6. Manual Therapy  7. Neuromuscular Re-education/Strengthening  8. Range of Motion (ROM)  9. Therapeutic Activites  10. Therapeutic Exercise/Strengthening  11. Dry Needling, Mechanical Traction   TREATMENT PLAN:  Effective Dates: 17 TO 17. Frequency/Duration: 2 times a week for 12 weeks  GOALS: (Goals have been discussed and agreed upon with patient.)  Short-Term Functional Goals: Time Frame: 2 weeks  1.  Patient will be independent with HEP without pain. 2. Patient will report that sleep is improved to > 5 hours without waking up due to numbness and tingling. Discharge Goals: Time Frame: 12 weeks  1. Patient will have NDI score < 5 points allowing him improved community participation,  2. Patient will have C/S AROM improved to 80 deg rotation with centralization of symptoms so he can look over shoulder safely. 3. Patient will have posture WNL to reduce neck pain and centralize symptoms. Rehabilitation Potential For Stated Goals: Good                HISTORY:   History of Present Injury/Illness (Reason for Referral):  65 y/o M with complain of right upper trapezius pain and right hand numbness that is provoked with overhead movement. He wakes up when sleeping with numbness in hand. Pain is 9/10 at worst everyday constant. He has not had imaging. He has history of two C5-7 fusion 2002 and 2012. His first surgery was not successful and he had surgery again 10 years later. Before his neck surgery he reported  weakness in hand. He says his new symptoms are different. Past Medical History/Comorbidities:   Mr. Lenard Mcintosh  has a past medical history of Chest pain (8/11/2016); Chronic cellulitis; Diabetes ([facility] [address] History of kidney stones; Hypertension; Obesity; and Unspecified adverse effect of anesthesia (1997). He also has no past medical history of Coagulation defects; COPD; DEMENTIA; Gastrointestinal disorder; Infectious disease; or Neurological disorder. Mr. Lenard Mcintosh  has a past surgical history that includes urological (2002); cervical fusion; cervical fusion (6-20-12); hernia repair; sinus surgery proc unlisted (1997); orthopaedic (Right); mohs procedure (Right); knee arthroscopy (Right, 2012); and knee arthroscopy (Left, 2013).   Social History/Living Environment:    Lives with wife  Prior Level of Function/Work/Activity:      Independent, 50 hour/week work restriction due to health   Previous Treatment Approaches: PT Jan 2017 that reduced symptoms but he had a dysruption in insurance coverage and he couldn't come anymore. He is not doing his HEP. Current Medications:    Current Outpatient Prescriptions:     BENICAR HCT 40-12.5 mg per tablet, TAKE 1 TABLET BY MOUTH EVERY DAY, Disp: 90 Tab, Rfl: 1    methocarbamol (ROBAXIN) 500 mg tablet, Take 1 Tab by mouth nightly as needed. , Disp: 20 Tab, Rfl: 0    metFORMIN (GLUCOPHAGE) 1,000 mg tablet, TAKE 1 TAB BY MOUTH TWO (2) TIMES DAILY (WITH MEALS). , Disp: 180 Tab, Rfl: 1    valACYclovir (VALTREX) 1 gram tablet, TAKE 1 TAB BY MOUTH DAILY. , Disp: 30 Tab, Rfl: 7    glimepiride (AMARYL) 2 mg tablet, TAKE 1 TABLET BY MOUTH TWO (2) TIMES A DAY., Disp: 90 Tab, Rfl: 1    tadalafil (CIALIS) 5 mg tablet, Take 1 Tab by mouth daily. , Disp: 30 Tab, Rfl: 2    cefPROZIL (CEFZIL) 250 mg tablet, Take 1 Tab by mouth two (2) times a day., Disp: 20 Tab, Rfl: 0    methylPREDNISolone (MEDROL, DIEGO,) 4 mg tablet, Take 1 Tab by mouth Specific Days and Specific Times. , Disp: 1 Dose Pack, Rfl: 0    ketorolac (TORADOL) 10 mg tablet, Take 1 Tab by mouth three (3) times daily. , Disp: 15 Tab, Rfl: 0    MAGNESIUM PO, Take  by mouth daily. , Disp: , Rfl:     multivitamin (ONE A DAY) tablet, Take 1 Tab by mouth daily. , Disp: , Rfl:     aspirin 81 mg chewable tablet, Take 1 Tab by mouth daily. , Disp: 30 Tab, Rfl: 0    albuterol (PROVENTIL HFA, VENTOLIN HFA, PROAIR HFA) 90 mcg/actuation inhaler, Take 2 Puffs by inhalation every four (4) hours as needed.  Indications: ACUTE ASTHMA ATTACK, Disp: 3 Inhaler, Rfl: 1    albuterol (PROVENTIL VENTOLIN) 2.5 mg /3 mL (0.083 %) nebulizer solution, 3 mL by Nebulization route every four (4) hours as needed for Wheezing., Disp: 24 Each, Rfl: 5    fluticasone-vilanterol (BREO ELLIPTA) 200-25 mcg/dose inhaler, 1 inhalation daily, rinse mouth after use, Disp: 1 Inhaler, Rfl: 11    cpap machine kit, , Disp: , Rfl:     montelukast (SINGULAIR) 10 mg tablet, Take 10 mg by mouth daily. , Disp: , Rfl:    Date Last Reviewed:  6/28/2017   EXAMINATION:   Observation/Orthostatic Postural Assessment: Forward head  Palpation:          Increased pain and tone right UT, L SO, R 1st rib hypomobile and painful, L to R C/S C2-5 hypomobile and painful   ROM:     C/S AROM  Left rotation 65 deg (reproduces left arm n/t  Right rotation 70 deg  Flexion 25 deg  Extension 25 deg  Right SB 30 deg  Left SB 30 deg      Strength:     n/t      Special Tests:          Spurling's positive for R UE pain, ULNT median positive R UE, Transverse and Alar ligaments negative  Neurological Screen:        Sensation: light touch intact B UE  Functional Mobility:         n/a  Balance:          n/a   Body Structures Involved:  1. Joints  2. Muscles Body Functions Affected:  1. Neuromusculoskeletal  2. Movement Related Activities and Participation Affected:  1. General Tasks and Demands  2. Self Care  3. Domestic Life  4. Interpersonal Interactions and Relationships  5. Community, Social and Civic Life   CLINICAL PRESENTATION:   CLINICAL DECISION MAKING:   Outcome Measure: Tool Used: Neck Disability Index (NDI)  Score:  Initial: 14/50  Most Recent: X/50 (Date: -- )   Interpretation of Score: The Neck Disability Index is a revised form of the Oswestry Low Back Pain Index and is designed to measure the activities of daily living in person's with neck pain. Each section is scored on a 0-5 scale, 5 representing the greatest disability. The scores of each section are added together for a total score of 50. Score 0 1-10 11-20 21-30 31-40 41-49 50   Modifier CH CI CJ CK CL CM CN         Medical Necessity:   · Patient is expected to demonstrate progress in range of motion, coordination and functional technique to decrease assistance required with sleeping. Reason for Services/Other Comments:  · Patient has been observed to have reduce ROM before, during or after an intervention.    TREATMENT:   (In addition to Assessment/Re-Assessment sessions the following treatments were rendered)  THERAPEUTIC EXERCISE: (see grid for minutes):  Exercises per grid below to improve mobility and coordination. Required moderate visual, verbal, manual and tactile cues to promote proper body alignment, promote proper body posture and promote proper body mechanics. Progressed resistance, range and complexity of movement as indicated. MANUAL THERAPY: (see grid for minutes): Joint mobilization, Soft tissue mobilization and Manipulation was utilized and necessary because of the patient's restricted joint motion, painful spasm, loss of articular motion and restricted motion of soft tissue. MODALITIES: (see grid for minutes): *  Ultrasound was used today secondary to the patient having tightened structures limiting joint motion that require an increase in extensibility and symptomatic soft tissue calcification. Ultrasound was used today to reduce pain, reduce spasm, reduce joint stiffness and increase muscle flexibility. *  Electrical Stimulation Therapy (IFC) was provided with intensity adjusted throughout treatment to patient tolerance. to reduce pain. *  Cold Pack Therapy in order to provide analgesia and reduce inflammation and edema. *  Hot Pack Therapy in order to relieve muscle spasm. MECHANICAL TRACTION: (see grid for minutes): Traction was used due to the patient's cervical radiculopathy in order to relieve pain in or originating from his spine.     Date: 6/5/17  (visit 1) 6/12/17 (visit 2) 6/16/17 (visit 3) 6/23/17  (visit 4) 6/26/17 (visit 5) 6/28/17  (visit 6)     Modalities: 5 min 15 min 15 min 15 min 15 min 15min     IFC and heat B UTs 15 min 15 min 15 min 15 min premod B UT                            Therapeutic Exercise: 15 mins 20 min 25 min 25 mins 30 min   35 mins     UBE    3' each direction 6 min L3 forward/reverse 5 min random L3 f/b     Supine chin tuck 10x x15  10x        Levator stretch bilateral  10 sec hold x 6 10 sec hold x 6 B   2xh20 B     Seated UT stretch 10x 10 sec hold x 6 Bilateral 10 sec hold x6 bilateral 3x30 sec holds B  2xH20 B     Seated Chin retraction 10x          TB rows with scap retraction Black 3x10 x30  30x PDs and rows 4 pl 3x10 PD's and scapula retraction, black blk x30   and extn x30     ULNT Median stretch 10x glide x10 glide x10 glide B UE 10x B       Pulleys (flexion and scaption)   x2 min ea SB I and T  10x5\" holds SB Y's, T's and I's 5 sec hold x 10      Cervical isometrics (all directions)  5 sec hold x 10 ea direction repeat Ball/wall series 10x5\" retraction 10x rotations repeat Ball/wall series 10x5\" retraction 10x rotations     Proprioceptive Activities:                                            Manual Therapy: 15 mins 15 min 15 min 15 min 15 min 10 mins     STM UTs with PROM rotation bilateral          SOR with distraction bilateral 15 min gentle manual distraction and cervical stretching in all directions 15 min PROM and cervical distraction Repeat supine 15 min cervical distraction and PROM cervical distraction and PROM     Therapeutic Activities:                                                       HEP: Handout provided 6/5/17  Treatment/Session Assessment: Pt has moderate tightness with B upper traps and levator scap. He is able to complete all TherEx without painful episodes, nor episodes of numbness or tingling. . Continue POC. · Pain/ Symptoms: Initial:   0/10   Pt reports compliance with HEP, including stretches. Pt denies painful episodes at time of treatment today. Pt says has not experienced episodes of  numbness and tingling in RUE in approx 1 week. Pt says now has varied duties at work, and is able to perform work duties without painful episodes. Post Session:  0/10 ·   Compliance with Program/Exercises: Will assess as treatment progresses. · Recommendations/Intent for next treatment session:  \"Next visit will focus on advancements to more challenging activities\".   ·   Total Treatment Duration:  PT Patient Time In/Time Out  Time In: 0800  Time Out: 0900     Miguel Angel Venegas, PTA

## 2017-07-20 NOTE — PROGRESS NOTES
Tamar Prajapati   ([id number]) [address] [facility]  [facility] [address]  [phone]   [phone]           PHYSICIAN COMMUNICATION and discontinuation summary    REFERRING PHYSICIAN: Tawana Ronquillo MD  Return Physician Appointment: unknown  MEDICAL/REFERRING DIAGNOSIS:  · Right shoulder pain [M25.511]  ATTENDANCE: [facility] has attended 6 out of 7 visits, with 1 cancellation(s) and 0 no shows. ASSESSMENT:  DATE: 7/20/2017    PROGRESS: Tamar Prajapati. has met his goals, has no pain, and is independent with HEP. RECOMMENDATIONS: Discharge to independent HEP. Thank you for this referral,  Kylah Manzo PT     Referring Physician Signature:  Tawana Ronquillo MD          Date

## 2018-03-07 PROBLEM — R05.9 COUGH: Status: ACTIVE | Noted: 2018-03-07

## 2018-06-06 ENCOUNTER — HOSPITAL ENCOUNTER (OUTPATIENT)
Dept: PHYSICAL THERAPY | Age: 58
Discharge: HOME | End: 2018-06-06
Attending: FAMILY MEDICINE
Payer: COMMERCIAL

## 2018-06-06 DIAGNOSIS — T14.8XXA MUSCLE STRAIN: ICD-10-CM

## 2018-06-06 PROCEDURE — 97162 PT EVAL MOD COMPLEX 30 MIN: CPT

## 2018-06-06 NOTE — PROGRESS NOTES
Ambulatory/Rehab Services H2 Model Falls Risk Assessment    Risk Factor Pts. ·   Confusion/Disorientation/Impulsivity  []    4 ·   Symptomatic Depression  []   2 ·   Altered Elimination  []   1 ·   Dizziness/Vertigo  []   1 ·   Gender (Male)  [x]   1 ·   Any administered antiepileptics (anticonvulsants):  []   2 ·   Any administered benzodiazepines:  []   1 ·   Visual Impairment (specify):  []   1 ·   Portable Oxygen Use  []   1 ·   Orthostatic ? BP  []   1 ·   History of Recent Falls (within 3 mos.)  []   5     Ability to Rise from Chair (choose one) Pts. ·   Ability to rise in a single movement  []   0 ·   Pushes up, successful in one attempt  [x]   1 ·   Multiple attempts, but successful  []   3 ·   Unable to rise without assistance  []   4   Total: (5 or greater = High Risk) 2     Falls Prevention Plan:   []                Physical Limitations to Exercise (specify):   []                Mobility Assistance Device (type):   []                Exercise/Equipment Adaptation (specify):    ©2010 [facility] [address] [place] [address] Patent #[id number].  Federal Law prohibits the replication, distribution or use without written permission from [facility] [employer]

## 2018-06-06 NOTE — THERAPY EVALUATION
Jaylin Maldonado Sr.  : 1960  Primary: [facility]  Secondary:  Therapy Center at [address]  [phone]   [phone]        OUTPATIENT PHYSICAL THERAPY:Initial Assessment 2018    ICD-10: Treatment Diagnosis: stiffness in joint, left hip M25.652  ICD-10: Treatment Diagnosis: difficulty walking R26.2  ICD-10: Treatment Diagnosis: strain of muscle, fascia and tendon of hip, sequela S76.012S    Precautions/Allergies:   Bactrim [sulfamethoxazole-trimethoprim]   Fall Risk Score: 2 (? 5 = High Risk)  MD Orders: evaluate and treat for left groin pain MEDICAL/REFERRING DIAGNOSIS:  Muscle strain [T14. 8XXA]   DATE OF ONSET: 2018  REFERRING PHYSICIAN: Collin Tijerina MD  RETURN PHYSICIAN APPOINTMENT: as needed     INITIAL ASSESSMENT:  Mr. Cirilo Mendez is a 62 y.o. male who presents to physical therapy with insidious onset of left groin pain since 2018. 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 He presents with soft tissue restrictions and muscle weakness and inhibition of his core stabilizers. He demonstrates an antalgic gait secondary to pain and restricted movements through his pelvis. He would benefit from skilled physical therapy to improve overall mobility and function with his ADLs. PROBLEM LIST (Impacting functional limitations):  1. Decreased Strength  2. Decreased ADL/Functional Activities  3. Decreased Transfer Abilities  4. Decreased Ambulation Ability/Technique  5. Decreased Balance  6. Increased Pain  7. Decreased Activity Tolerance  8. Decreased Flexibility/Joint Mobility INTERVENTIONS PLANNED:  1. Balance Exercise  2. Gait Training  3. Home Exercise Program (HEP)  4. Manual Therapy  5. Neuromuscular Re-education/Strengthening  6. Range of Motion (ROM)  7. Therapeutic Activites  8.  Therapeutic Exercise/Strengthening   TREATMENT PLAN:  Effective Dates: 6/6/2018 TO 9/4/2018 (90 days). Frequency/Duration:1 visit every other week for 90 Days (secondary to patient's work schedule)  GOALS: (Goals have been discussed and agreed upon with patient.)  Discharge Goals: Time Frame: 90 days  1. Patient demonstrates independence with his HEP without verbal cueing from therapist.  2. Patient able to ambulate for 1 hour without onset of left groin pain. 3. Patient able to get dressed and put shoes on without discomfort in left groin. 4. Improve LEFS outcome measure score from 36/80 to 70/80 to improve ADLs. Rehabilitation Potential For Stated Goals: Excellent  Regarding Stevie Brown Sr.'s therapy, I certify that the treatment plan above will be carried out by a therapist or under their direction. Thank you for this referral,  Lorne Lovell PT                 The information in this section was collected on 6/6/18 (except where otherwise noted). HISTORY:   History of Present Injury/Illness (Reason for Referral): Insidious onset of left groin pain in March 2018, noted it one morning as he woke up and challenged with lifting leg to put his shoes and socks on. Has also noted difficulty with stairs and prolonged walking. Increased tension noted when walking dogs and any pulling on the leash. He notes he has a history of two hernia repairs in the [de-identified] and 90s, however with assessment from his MD, he recommended physical therapy. 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 Prolonged standing and walking at work can irritated his left groin. Patient denies dizziness, drop attacks, numbness/tingling, bowel/bladder dysfunction and/or unexplained weight gain/loss. Patient reports no testicular pain. Past Medical History/Comorbidities:   Mr. Haresh Tena  has a past medical history of Chest pain (8/11/2016); Chronic cellulitis; Diabetes ([address] History of kidney stones;  Hypertension; Obesity; and Unspecified adverse effect of anesthesia (1997). Mr. Laura Santamaria  has a past surgical history that includes hx urological (2002); hx cervical fusion; hx cervical fusion (6-20-12); hx hernia repair; pr sinus surgery proc unlisted (1997); hx orthopaedic (Right); hx mohs procedure (Right); hx knee arthroscopy (Right, 2012); and hx knee arthroscopy (Left, 2013). 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 Dominant Side:         RIGHT  Current Medications:       Current Outpatient Prescriptions:     glimepiride (AMARYL) 2 mg tablet, TAKE 1 TABLET BY MOUTH TWO (2) TIMES A DAY., Disp: 90 Tab, Rfl: 1    albuterol (PROVENTIL VENTOLIN) 2.5 mg /3 mL (0.083 %) nebulizer solution, 3 mL by Nebulization route every four (4) hours as needed for Wheezing., Disp: 24 Each, Rfl: 5    BENICAR HCT 40-12.5 mg per tablet, TAKE 1 TABLET BY MOUTH EVERY DAY, Disp: 90 Tab, Rfl: 1    BREO ELLIPTA 200-25 mcg/dose inhaler, USE 1 INHALATION DAILY, RINSE MOUTH AFTER USE, Disp: 1 Inhaler, Rfl: 2    tadalafil (CIALIS) 5 mg tablet, Take 1 Tab by mouth daily. , Disp: 90 Tab, Rfl: 3    metFORMIN (GLUCOPHAGE) 1,000 mg tablet, TAKE 1 TAB BY MOUTH TWO (2) TIMES DAILY (WITH MEALS). , Disp: 180 Tab, Rfl: 1    valACYclovir (VALTREX) 1 gram tablet, TAKE 1 TAB BY MOUTH DAILY. , Disp: 30 Tab, Rfl: 7    multivitamin (ONE A DAY) tablet, Take 1 Tab by mouth daily. , Disp: , Rfl:     aspirin 81 mg chewable tablet, Take 1 Tab by mouth daily. , Disp: 30 Tab, Rfl: 0    albuterol (PROVENTIL HFA, VENTOLIN HFA, PROAIR HFA) 90 mcg/actuation inhaler, Take 2 Puffs by inhalation every four (4) hours as needed.  Indications: ACUTE ASTHMA ATTACK, Disp: 3 Inhaler, Rfl: 1    cpap machine kit, , Disp: , Rfl:     montelukast (SINGULAIR) 10 mg tablet, Take 10 mg by mouth daily., Disp: , Rfl:    Date Last Reviewed:  6/6/2018     Number of Personal Factors/Comorbidities that affect the Plan of Care: 1-2: MODERATE COMPLEXITY   EXAMINATION:   Observation/Orthostatic Postural Assessment:          Patient denies any LE pain, paresthesia or symptoms. Patient denies any increase of symptoms with cough, sneeze or valsalva. Patient denies any saddle paresthesia or bowel/bladder deficits. Patient exhibits a increased lumbar lordosis and neutral thoracic kyphosis. Lower extremity weight bearing is slight increased left. Shoulder symmetry exhibits mild protraction bilaterally. Observation of gait indicates increased bilateral heel strike and side bending through torso (patient is a pelvic and hip walker versus an efficient trunk walker). Lower quadrant bony landmarks are left iliac crest elevated. Leg swing for innominate mobility indicates left restricted in innominate extension and flexion. Vertical compression test (VCT) for alignment is 3. Elbow flexion test (EFT) for motor activation is 3. Soft tissue observation indicates restrictions through viscera, bilateral iliacus, psoas major, left rectus femoris and piriformis. .  Palpation: Myofascial assessment indicates restrictions through thoracodosal fascia and visceral restrictions. Muscle length testing in modified Luis Eduardo position exhibits restrictions left. Hamstring flexibility tested supine with straight leg raise (SLR) is left 50 degrees, right 55 degrees. Piriformis length is restricted left. Quadriceps flexibility tested prone with heel to buttock is restricted left. Ely test is positive for rectus femoris tightness. Gastrocnemius and soleus flexibility are WFL. Sacrotuberous ligament is not tested today. Sacrococcygeal junction exhibits not tested today. Body of coccyx is not tested today.   ROM: Passive trunk rotation is 80% available to the R and 80% available to the L. Kinetic testing in standing including forward bend test is positive left. Marcher's test is positive left. Pelvic shear test is standing exhibits decreased excursion of left shear with a hard end feel. Spring testing of sacrum exhibits decreased a/a mobilization of left sacral base. Spring testing of innominates exhibits decreased left innominate spring into extension. AROM (PROM) Right Left   Hip flexion/Innominate flexion 85 70   Hip extension/Innominate extension Lacks 5 Lacks 10   Hip external rotation (ER) 20 20   Hip internal rotation (IR) 15 15     Strength: Lumbar protective mechanism (LPM) are not tested today for initiation. LPM Strength */5   R anterior to posterior diagonal    L anterior to posterior diagonal    R posterior to anterior diagonal    L posterior to anterior diagonal      Manual Muscle Test (*/5) Right Left   Knee extension 5 5   Knee flexion 5 5   Hip flexion 5 4   Hip ER 5 4   Hip IR 5 4   Hip extension 4 4-   Hip abduction 4 4-   Hip adduction 5 4   Ankle DF 5 5   Ankle PF 5 5   Core stability 4-/5     Special Tests:  Marthenia Bihari test is positive left. Scours test is negative  Neurological Screen:  Myotomes: Key muscle strength testing through bilateral LE is intact. Dermatomes: Sensation testing through bilateral lower quadrants for light touch is intact. Reflexes: Patellar (L4) and Achilles (S1) are not tested today. Neural tension tests: Passive straight leg raise (SLR) test is negative. Slump test is negative. Functional Mobility:  Challenged with ambulation, hip flexion activities and prolonged weight bearing. Body Structures Involved:  1. Bones  2. Joints  3. Muscles Body Functions Affected:  1. Sensory/Pain  2. Neuromusculoskeletal  3. Movement Related Activities and Participation Affected:  1. General Tasks and Demands  2. Mobility  3. Domestic Life  4.  Community, Social and [place] [address]   Number of elements (examined above) that affect the Plan of Care: 3: MODERATE COMPLEXITY   CLINICAL PRESENTATION:   Presentation: Evolving clinical presentation with changing clinical characteristics: MODERATE COMPLEXITY   CLINICAL DECISION MAKING:   Outcome Measure: Tool Used: Lower Extremity Functional Scale (LEFS)  Score:  Initial: 36/80 Most Recent: X/80 (Date: -- )   Interpretation of Score: 20 questions each scored on a 5 point scale with 0 representing \"extreme difficulty or unable to perform\" and 4 representing \"no difficulty\". The lower the score, the greater the functional disability. 80/80 represents no disability. Minimal detectable change is 9 points. Medical Necessity:   · Patient is expected to demonstrate progress in strength, range of motion, balance, coordination and functional technique to increase independence with ADLs, ambulation and work activities. Reason for Services/Other Comments:  · Patient continues to require skilled intervention due to challenged with ADLs and ambulation secondary to left groin pain. Use of outcome tool(s) and clinical judgement create a POC that gives a: Questionable prediction of patient's progress: MODERATE COMPLEXITY            TREATMENT:   (In addition to Assessment/Re-Assessment sessions the following treatments were rendered)  Pre-treatment Symptoms/Complaints:  Patient notes daily discomfort, however not consistent, most challenged with putting shoes on  Pain: Initial:   Pain Intensity 1: 5  Pain Location 1: Groin  Pain Orientation 1: Left  Post Session:  4/10     Assessment only today, no treatment provided. [employer]  Treatment/Session Assessment:    · Response to Treatment:  Improved understanding of anatomy and plan of care of physical therapy. · Compliance with Program/Exercises: compliant all of the time. · Recommendations/Intent for next treatment session: \"Next visit will focus on advancements to more challenging activities\".   Total Treatment Duration: 60 minutes evaluation  PT Patient Time In/Time Out  Time In: 0940  Time Out: Angeline Purcell, KATHLEEN

## 2018-06-11 ENCOUNTER — HOSPITAL ENCOUNTER (OUTPATIENT)
Dept: PHYSICAL THERAPY | Age: 58
Discharge: HOME | End: 2018-06-11
Attending: FAMILY MEDICINE
Payer: COMMERCIAL

## 2018-06-11 PROCEDURE — 97110 THERAPEUTIC EXERCISES: CPT

## 2018-06-11 PROCEDURE — 97140 MANUAL THERAPY 1/> REGIONS: CPT

## 2018-06-12 NOTE — PROGRESS NOTES
Giovanny Browning Sr.  : 1960  Primary: [facility]  Secondary:  [facility] at [address]  [phone]   [phone]        OUTPATIENT PHYSICAL THERAPY:Daily Note 2018    ICD-10: Treatment Diagnosis: stiffness in joint, left hip M25.652  ICD-10: Treatment Diagnosis: difficulty walking R26.2  ICD-10: Treatment Diagnosis: strain of muscle, fascia and tendon of hip, sequela S76.012S    Precautions/Allergies:   Bactrim [sulfamethoxazole-trimethoprim]   Fall Risk Score: 2 (? 5 = High Risk)  MD Orders: evaluate and treat for left groin pain MEDICAL/REFERRING DIAGNOSIS:  Muscle strain [T14. 8XXA]   DATE OF ONSET: 2018  REFERRING PHYSICIAN: Kelsey Tolbert MD  RETURN PHYSICIAN APPOINTMENT: as needed     INITIAL ASSESSMENT:  Mr. Nini Toscano is a 62 y.o. male who presents to physical therapy with insidious onset of left groin pain since 2018. 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 He presents with soft tissue restrictions and muscle weakness and inhibition of his core stabilizers. He demonstrates an antalgic gait secondary to pain and restricted movements through his pelvis. He would benefit from skilled physical therapy to improve overall mobility and function with his ADLs. PROBLEM LIST (Impacting functional limitations):  1. Decreased Strength  2. Decreased ADL/Functional Activities  3. Decreased Transfer Abilities  4. Decreased Ambulation Ability/Technique  5. Decreased Balance  6. Increased Pain  7. Decreased Activity Tolerance  8. Decreased Flexibility/Joint Mobility INTERVENTIONS PLANNED:  1. Balance Exercise  2. Gait Training  3. Home Exercise Program (HEP)  4. Manual Therapy  5. Neuromuscular Re-education/Strengthening  6. Range of Motion (ROM)  7. Therapeutic Activites  8.  Therapeutic Exercise/Strengthening   TREATMENT PLAN:  Effective Dates: 6/6/2018 TO 9/4/2018 (90 days). Frequency/Duration:1 visit every other week for 90 Days (secondary to patient's work schedule)  GOALS: (Goals have been discussed and agreed upon with patient.)  Discharge Goals: Time Frame: 90 days  1. Patient demonstrates independence with his HEP without verbal cueing from therapist.  2. Patient able to ambulate for 1 hour without onset of left groin pain. 3. Patient able to get dressed and put shoes on without discomfort in left groin. 4. Improve LEFS outcome measure score from 36/80 to 70/80 to improve ADLs. Rehabilitation Potential For Stated Goals: Excellent  Regarding Ayad Devine Sr.'s therapy, I certify that the treatment plan above will be carried out by a therapist or under their direction. Thank you for this referral,  Tammy Cervantes PT                 The information in this section was collected on 6/6/18 (except where otherwise noted). HISTORY:   History of Present Injury/Illness (Reason for Referral): Insidious onset of left groin pain in March 2018, noted it one morning as he woke up and challenged with lifting leg to put his shoes and socks on. Has also noted difficulty with stairs and prolonged walking. Increased tension noted when walking dogs and any pulling on the leash. He notes he has a history of two hernia repairs in the [de-identified] and 90s, however with assessment from his MD, he recommended physical therapy. 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 Prolonged standing and walking at work can irritated his left groin. Patient denies dizziness, drop attacks, numbness/tingling, bowel/bladder dysfunction and/or unexplained weight gain/loss. Patient reports no testicular pain. Past Medical History/Comorbidities:   Mr. Ritika Ramirez  has a past medical history of Chest pain (8/11/2016); Chronic cellulitis; Diabetes ([address] History of kidney stones;  Hypertension; Obesity; and Unspecified adverse effect of anesthesia (1997). Mr. Damon Morton  has a past surgical history that includes hx urological (2002); hx cervical fusion; hx cervical fusion (6-20-12); hx hernia repair; pr sinus surgery proc unlisted (1997); hx orthopaedic (Right); hx mohs procedure (Right); hx knee arthroscopy (Right, 2012); and hx knee arthroscopy (Left, 2013). 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 Dominant Side:         RIGHT  Current Medications:       Current Outpatient Prescriptions:     glimepiride (AMARYL) 2 mg tablet, TAKE 1 TABLET BY MOUTH TWO (2) TIMES A DAY., Disp: 90 Tab, Rfl: 1    albuterol (PROVENTIL VENTOLIN) 2.5 mg /3 mL (0.083 %) nebulizer solution, 3 mL by Nebulization route every four (4) hours as needed for Wheezing., Disp: 24 Each, Rfl: 5    BENICAR HCT 40-12.5 mg per tablet, TAKE 1 TABLET BY MOUTH EVERY DAY, Disp: 90 Tab, Rfl: 1    BREO ELLIPTA 200-25 mcg/dose inhaler, USE 1 INHALATION DAILY, RINSE MOUTH AFTER USE, Disp: 1 Inhaler, Rfl: 2    tadalafil (CIALIS) 5 mg tablet, Take 1 Tab by mouth daily. , Disp: 90 Tab, Rfl: 3    metFORMIN (GLUCOPHAGE) 1,000 mg tablet, TAKE 1 TAB BY MOUTH TWO (2) TIMES DAILY (WITH MEALS). , Disp: 180 Tab, Rfl: 1    valACYclovir (VALTREX) 1 gram tablet, TAKE 1 TAB BY MOUTH DAILY. , Disp: 30 Tab, Rfl: 7    multivitamin (ONE A DAY) tablet, Take 1 Tab by mouth daily. , Disp: , Rfl:     aspirin 81 mg chewable tablet, Take 1 Tab by mouth daily. , Disp: 30 Tab, Rfl: 0    albuterol (PROVENTIL HFA, VENTOLIN HFA, PROAIR HFA) 90 mcg/actuation inhaler, Take 2 Puffs by inhalation every four (4) hours as needed.  Indications: ACUTE ASTHMA ATTACK, Disp: 3 Inhaler, Rfl: 1    cpap machine kit, , Disp: , Rfl:     montelukast (SINGULAIR) 10 mg tablet, Take 10 mg by mouth daily., Disp: , Rfl:    Date Last Reviewed:  6/11/2018     Number of Personal Factors/Comorbidities that affect the Plan of Care: 1-2: MODERATE COMPLEXITY   EXAMINATION:   Observation/Orthostatic Postural Assessment:          Patient denies any LE pain, paresthesia or symptoms. Patient denies any increase of symptoms with cough, sneeze or valsalva. Patient denies any saddle paresthesia or bowel/bladder deficits. Patient exhibits a increased lumbar lordosis and neutral thoracic kyphosis. Lower extremity weight bearing is slight increased left. Shoulder symmetry exhibits mild protraction bilaterally. Observation of gait indicates increased bilateral heel strike and side bending through torso (patient is a pelvic and hip walker versus an efficient trunk walker). Lower quadrant bony landmarks are left iliac crest elevated. Leg swing for innominate mobility indicates left restricted in innominate extension and flexion. Vertical compression test (VCT) for alignment is 3. Elbow flexion test (EFT) for motor activation is 3. Soft tissue observation indicates restrictions through viscera, bilateral iliacus, psoas major, left rectus femoris and piriformis. .  Palpation: Myofascial assessment indicates restrictions through thoracodosal fascia and visceral restrictions. Muscle length testing in modified Luis Eduardo position exhibits restrictions left. Hamstring flexibility tested supine with straight leg raise (SLR) is left 50 degrees, right 55 degrees. Piriformis length is restricted left. Quadriceps flexibility tested prone with heel to buttock is restricted left. Ely test is positive for rectus femoris tightness. Gastrocnemius and soleus flexibility are WFL. Sacrotuberous ligament is not tested today. Sacrococcygeal junction exhibits not tested today. Body of coccyx is not tested today.   ROM: Passive trunk rotation is 80% available to the R and 80% available to the L. Kinetic testing in standing including forward bend test is positive left. Marcher's test is positive left. Pelvic shear test is standing exhibits decreased excursion of left shear with a hard end feel. Spring testing of sacrum exhibits decreased a/a mobilization of left sacral base. Spring testing of innominates exhibits decreased left innominate spring into extension. AROM (PROM) Right Left   Hip flexion/Innominate flexion 85 70   Hip extension/Innominate extension Lacks 5 Lacks 10   Hip external rotation (ER) 20 20   Hip internal rotation (IR) 15 15     Strength: Lumbar protective mechanism (LPM) are not tested today for initiation. LPM Strength */5   R anterior to posterior diagonal    L anterior to posterior diagonal    R posterior to anterior diagonal    L posterior to anterior diagonal      Manual Muscle Test (*/5) Right Left   Knee extension 5 5   Knee flexion 5 5   Hip flexion 5 4   Hip ER 5 4   Hip IR 5 4   Hip extension 4 4-   Hip abduction 4 4-   Hip adduction 5 4   Ankle DF 5 5   Ankle PF 5 5   Core stability 4-/5     Special Tests:  Marthenia Bihari test is positive left. Scours test is negative  Neurological Screen:  Myotomes: Key muscle strength testing through bilateral LE is intact. Dermatomes: Sensation testing through bilateral lower quadrants for light touch is intact. Reflexes: Patellar (L4) and Achilles (S1) are not tested today. Neural tension tests: Passive straight leg raise (SLR) test is negative. Slump test is negative. Functional Mobility:  Challenged with ambulation, hip flexion activities and prolonged weight bearing. Body Structures Involved:  1. Bones  2. Joints  3. Muscles Body Functions Affected:  1. Sensory/Pain  2. Neuromusculoskeletal  3. Movement Related Activities and Participation Affected:  1. General Tasks and Demands  2. Mobility  3. Domestic Life  4.  Community, Social and [place]   Number of elements (examined above) that affect the Plan of Care: 3: MODERATE COMPLEXITY   CLINICAL PRESENTATION:   Presentation: Evolving clinical presentation with changing clinical characteristics: MODERATE COMPLEXITY   CLINICAL DECISION MAKING:   Outcome Measure: Tool Used: Lower Extremity Functional Scale (LEFS)  Score:  Initial: 36/80 Most Recent: X/80 (Date: -- )   Interpretation of Score: 20 questions each scored on a 5 point scale with 0 representing \"extreme difficulty or unable to perform\" and 4 representing \"no difficulty\". The lower the score, the greater the functional disability. 80/80 represents no disability. Minimal detectable change is 9 points. Medical Necessity:   · Patient is expected to demonstrate progress in strength, range of motion, balance, coordination and functional technique to increase independence with ADLs, ambulation and work activities. Reason for Services/Other Comments:  · Patient continues to require skilled intervention due to challenged with ADLs and ambulation secondary to left groin pain. Use of outcome tool(s) and clinical judgement create a POC that gives a: Questionable prediction of patient's progress: MODERATE COMPLEXITY            TREATMENT:   (In addition to Assessment/Re-Assessment sessions the following treatments were rendered)  Pre-treatment Symptoms/Complaints:  Patient notes increased discomfort with walking his dogs this weekend in the left groin. Pain: Initial:   Pain Intensity 1: 5  Pain Location 1: Groin  Pain Orientation 1: Left  Post Session:  4/10     Therapeutic Exercise: (25 Minutes):  Exercises per grid below to improve mobility, strength, balance and coordination. Required minimal verbal and manual cues to promote proper body alignment, promote proper body posture and promote proper body mechanics. Progressed resistance, range, repetitions and complexity of movement as indicated. Date:  6/11/2018   Activity/Exercise Parameters   Review sleeping positions X 5 minutes   Supine hip release X 30 seconds, 3 positions.     Modified keanu hip flexor stretch X 5 reps, 20 sec holds blilaterally   1/2 prone hip extension X 15 reps 5 sec holds   Review gait and walking program X 5 minutes             Manual Therapy (    Soft Tissue Mobilization Duration  Duration: 35 Minutes): Manual techniques to facilitate improved motion and decreased pain. (Used abbreviations: MET - muscle energy technique; PNF - proprioceptive neuromuscular facilitation; NMR - neuromuscular re-education; a/p - anterior to posterior; p/a - posterior to anterior; FMP: functional movement pattern)   · Supine left anterior pelvis and hip soft tissue mobilization through iliacus and psoas, inguinal ligament  · Supine left LE long axis distraction  · Prone hip on axis mobilization bilaterally with manual resistance into hip ER/IR, followed with NMR  · Prone left sacral base p/a mobilization with FMP of hip ER/IR, followed with NMR      [facility] Portal  Treatment/Session Assessment:    · Response to Treatment:  Decreased restrictions noted in left sacrum, continues to demonstrate restrictions with hip and innominate flexion of the left side. · Compliance with Program/Exercises: compliant all of the time. · Recommendations/Intent for next treatment session: \"Next visit will focus on advancements to more challenging activities\".   Total Treatment Duration: 60 minutes   PT Patient Time In/Time Out  Time In: 1030  Time Out: 5500 Roland Manuel, PT

## 2018-06-27 ENCOUNTER — HOSPITAL ENCOUNTER (OUTPATIENT)
Dept: PHYSICAL THERAPY | Age: 58
Discharge: HOME | End: 2018-06-27
Attending: FAMILY MEDICINE
Payer: COMMERCIAL

## 2018-06-27 PROCEDURE — 97140 MANUAL THERAPY 1/> REGIONS: CPT

## 2018-06-27 PROCEDURE — 97110 THERAPEUTIC EXERCISES: CPT

## 2018-06-27 NOTE — PROGRESS NOTES
Ashagavin Figueroa Sr.  : 1960  Primary: [facility]  Secondary:  [facility] at [address]  [phone]   [phone]        OUTPATIENT PHYSICAL THERAPY:Daily Note 2018    ICD-10: Treatment Diagnosis: stiffness in joint, left hip M25.652  ICD-10: Treatment Diagnosis: difficulty walking R26.2  ICD-10: Treatment Diagnosis: strain of muscle, fascia and tendon of hip, sequela S76.012S    Precautions/Allergies:   Bactrim [sulfamethoxazole-trimethoprim]   Fall Risk Score: 2 (? 5 = High Risk)  MD Orders: evaluate and treat for left groin pain MEDICAL/REFERRING DIAGNOSIS:  Muscle strain [T14. 8XXA]   DATE OF ONSET: 2018  REFERRING PHYSICIAN: Aiden Guzmán MD  RETURN PHYSICIAN APPOINTMENT: as needed     INITIAL ASSESSMENT:  Mr. Antonina Angulo is a 62 y.o. male who presents to physical therapy with insidious onset of left groin pain since 2018. 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 He presents with soft tissue restrictions and muscle weakness and inhibition of his core stabilizers. He demonstrates an antalgic gait secondary to pain and restricted movements through his pelvis. He would benefit from skilled physical therapy to improve overall mobility and function with his ADLs. PROBLEM LIST (Impacting functional limitations):  1. Decreased Strength  2. Decreased ADL/Functional Activities  3. Decreased Transfer Abilities  4. Decreased Ambulation Ability/Technique  5. Decreased Balance  6. Increased Pain  7. Decreased Activity Tolerance  8. Decreased Flexibility/Joint Mobility INTERVENTIONS PLANNED:  1. Balance Exercise  2. Gait Training  3. Home Exercise Program (HEP)  4. Manual Therapy  5. Neuromuscular Re-education/Strengthening  6. Range of Motion (ROM)  7. Therapeutic Activites  8.  Therapeutic Exercise/Strengthening   TREATMENT PLAN:  Effective Dates: 6/6/2018 TO 9/4/2018 (90 days). Frequency/Duration:1 visit every other week for 90 Days (secondary to patient's work schedule)  GOALS: (Goals have been discussed and agreed upon with patient.)  Discharge Goals: Time Frame: 90 days  1. Patient demonstrates independence with his HEP without verbal cueing from therapist.  2. Patient able to ambulate for 1 hour without onset of left groin pain. 3. Patient able to get dressed and put shoes on without discomfort in left groin. 4. Improve LEFS outcome measure score from 36/80 to 70/80 to improve ADLs. Rehabilitation Potential For Stated Goals: Excellent  Regarding Ava Olivares Sr.'s therapy, I certify that the treatment plan above will be carried out by a therapist or under their direction. Thank you for this referral,  Regulo Garcia PT                 The information in this section was collected on 6/6/18 (except where otherwise noted). HISTORY:   History of Present Injury/Illness (Reason for Referral): Insidious onset of left groin pain in March 2018, noted it one morning as he woke up and challenged with lifting leg to put his shoes and socks on. Has also noted difficulty with stairs and prolonged walking. Increased tension noted when walking dogs and any pulling on the leash. He notes he has a history of two hernia repairs in the [de-identified] and 90s, however with assessment from his MD, he recommended physical therapy. 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 Prolonged standing and walking at work can irritated his left groin. Patient denies dizziness, drop attacks, numbness/tingling, bowel/bladder dysfunction and/or unexplained weight gain/loss. Patient reports no testicular pain. Past Medical History/Comorbidities:   Mr. Mery Guzman  has a past medical history of Chest pain (8/11/2016); Chronic cellulitis; Diabetes ([address] History of kidney stones;  Hypertension; Obesity; and Unspecified adverse effect of anesthesia (1997). Mr. Emmett Whiteside  has a past surgical history that includes hx urological (2002); hx cervical fusion; hx cervical fusion (6-20-12); hx hernia repair; pr sinus surgery proc unlisted (1997); hx orthopaedic (Right); hx mohs procedure (Right); hx knee arthroscopy (Right, 2012); and hx knee arthroscopy (Left, 2013). 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 Dominant Side:         RIGHT  Current Medications:       Current Outpatient Prescriptions:     glimepiride (AMARYL) 2 mg tablet, TAKE 1 TABLET BY MOUTH TWO (2) TIMES A DAY., Disp: 90 Tab, Rfl: 1    albuterol (PROVENTIL VENTOLIN) 2.5 mg /3 mL (0.083 %) nebulizer solution, 3 mL by Nebulization route every four (4) hours as needed for Wheezing., Disp: 24 Each, Rfl: 5    BENICAR HCT 40-12.5 mg per tablet, TAKE 1 TABLET BY MOUTH EVERY DAY, Disp: 90 Tab, Rfl: 1    BREO ELLIPTA 200-25 mcg/dose inhaler, USE 1 INHALATION DAILY, RINSE MOUTH AFTER USE, Disp: 1 Inhaler, Rfl: 2    tadalafil (CIALIS) 5 mg tablet, Take 1 Tab by mouth daily. , Disp: 90 Tab, Rfl: 3    metFORMIN (GLUCOPHAGE) 1,000 mg tablet, TAKE 1 TAB BY MOUTH TWO (2) TIMES DAILY (WITH MEALS). , Disp: 180 Tab, Rfl: 1    valACYclovir (VALTREX) 1 gram tablet, TAKE 1 TAB BY MOUTH DAILY. , Disp: 30 Tab, Rfl: 7    multivitamin (ONE A DAY) tablet, Take 1 Tab by mouth daily. , Disp: , Rfl:     aspirin 81 mg chewable tablet, Take 1 Tab by mouth daily. , Disp: 30 Tab, Rfl: 0    albuterol (PROVENTIL HFA, VENTOLIN HFA, PROAIR HFA) 90 mcg/actuation inhaler, Take 2 Puffs by inhalation every four (4) hours as needed.  Indications: ACUTE ASTHMA ATTACK, Disp: 3 Inhaler, Rfl: 1    cpap machine kit, , Disp: , Rfl:     montelukast (SINGULAIR) 10 mg tablet, Take 10 mg by mouth daily., Disp: , Rfl:    Date Last Reviewed:  6/27/2018     Number of Personal Factors/Comorbidities that affect the Plan of Care: 1-2: MODERATE COMPLEXITY   EXAMINATION:   Observation/Orthostatic Postural Assessment:          Patient denies any LE pain, paresthesia or symptoms. Patient denies any increase of symptoms with cough, sneeze or valsalva. Patient denies any saddle paresthesia or bowel/bladder deficits. Patient exhibits a increased lumbar lordosis and neutral thoracic kyphosis. Lower extremity weight bearing is slight increased left. Shoulder symmetry exhibits mild protraction bilaterally. Observation of gait indicates increased bilateral heel strike and side bending through torso (patient is a pelvic and hip walker versus an efficient trunk walker). Lower quadrant bony landmarks are left iliac crest elevated. Leg swing for innominate mobility indicates left restricted in innominate extension and flexion. Vertical compression test (VCT) for alignment is 3. Elbow flexion test (EFT) for motor activation is 3. Soft tissue observation indicates restrictions through viscera, bilateral iliacus, psoas major, left rectus femoris and piriformis. .  Palpation: Myofascial assessment indicates restrictions through thoracodosal fascia and visceral restrictions. Muscle length testing in modified Luis Eduardo position exhibits restrictions left. Hamstring flexibility tested supine with straight leg raise (SLR) is left 50 degrees, right 55 degrees. Piriformis length is restricted left. Quadriceps flexibility tested prone with heel to buttock is restricted left. Ely test is positive for rectus femoris tightness. Gastrocnemius and soleus flexibility are WFL. Sacrotuberous ligament is not tested today. Sacrococcygeal junction exhibits not tested today. Body of coccyx is not tested today.   ROM: Passive trunk rotation is 80% available to the R and 80% available to the L. Kinetic testing in standing including forward bend test is positive left. Marcher's test is positive left. Pelvic shear test is standing exhibits decreased excursion of left shear with a hard end feel. Spring testing of sacrum exhibits decreased a/a mobilization of left sacral base. Spring testing of innominates exhibits decreased left innominate spring into extension. AROM (PROM) Right Left   Hip flexion/Innominate flexion 85 70   Hip extension/Innominate extension Lacks 5 Lacks 10   Hip external rotation (ER) 20 20   Hip internal rotation (IR) 15 15     Strength: Lumbar protective mechanism (LPM) are not tested today for initiation. LPM Strength */5   R anterior to posterior diagonal    L anterior to posterior diagonal    R posterior to anterior diagonal    L posterior to anterior diagonal      Manual Muscle Test (*/5) Right Left   Knee extension 5 5   Knee flexion 5 5   Hip flexion 5 4   Hip ER 5 4   Hip IR 5 4   Hip extension 4 4-   Hip abduction 4 4-   Hip adduction 5 4   Ankle DF 5 5   Ankle PF 5 5   Core stability 4-/5     Special Tests:  Cresenciano Gallagher test is positive left. Scours test is negative  Neurological Screen:  Myotomes: Key muscle strength testing through bilateral LE is intact. Dermatomes: Sensation testing through bilateral lower quadrants for light touch is intact. Reflexes: Patellar (L4) and Achilles (S1) are not tested today. Neural tension tests: Passive straight leg raise (SLR) test is negative. Slump test is negative. Functional Mobility:  Challenged with ambulation, hip flexion activities and prolonged weight bearing. Body Structures Involved:  1. Bones  2. Joints  3. Muscles Body Functions Affected:  1. Sensory/Pain  2. Neuromusculoskeletal  3. Movement Related Activities and Participation Affected:  1. General Tasks and Demands  2. Mobility  3. Domestic Life  4.  Community, Social and [place]   Number of elements (examined above) that affect the Plan of Care: 3: MODERATE COMPLEXITY   CLINICAL PRESENTATION:   Presentation: Evolving clinical presentation with changing clinical characteristics: MODERATE COMPLEXITY   CLINICAL DECISION MAKING:   Outcome Measure: Tool Used: Lower Extremity Functional Scale (LEFS)  Score:  Initial: 36/80 Most Recent: X/80 (Date: -- )   Interpretation of Score: 20 questions each scored on a 5 point scale with 0 representing \"extreme difficulty or unable to perform\" and 4 representing \"no difficulty\". The lower the score, the greater the functional disability. 80/80 represents no disability. Minimal detectable change is 9 points. Medical Necessity:   · Patient is expected to demonstrate progress in strength, range of motion, balance, coordination and functional technique to increase independence with ADLs, ambulation and work activities. Reason for Services/Other Comments:  · Patient continues to require skilled intervention due to challenged with ADLs and ambulation secondary to left groin pain. Use of outcome tool(s) and clinical judgement create a POC that gives a: Questionable prediction of patient's progress: MODERATE COMPLEXITY            TREATMENT:   (In addition to Assessment/Re-Assessment sessions the following treatments were rendered)  Pre-treatment Symptoms/Complaints:  Patient notes less groin pain over the past two weeks, notes increased used of stairs have not irritated symptoms. Pain: Initial:   Pain Intensity 1: 3  Pain Location 1: Groin  Pain Orientation 1: Left  Post Session:  2/10     Therapeutic Exercise: (25 Minutes):  Exercises per grid below to improve mobility, strength, balance and coordination. Required minimal verbal and manual cues to promote proper body alignment, promote proper body posture and promote proper body mechanics. Progressed resistance, range, repetitions and complexity of movement as indicated. Date:  6/27/2018   Activity/Exercise Parameters   Review sleeping positions X 5 minutes   Supine hip release X 30 seconds, 3 positions.     Modified keanu hip flexor stretch X 5 reps, 20 sec holds blilaterally   1/2 prone hip extension X 15 reps 5 sec holds   Review gait and walking program    Quadriped arch/sag X 10 reps, 5 sec holds   Quadriped hip extension X 10 reps, 5 sec holds   Standing hip abduction X 10 reps, lateral steps green band     Manual Therapy (    Soft Tissue Mobilization Duration  Duration: 35 Minutes): Manual techniques to facilitate improved motion and decreased pain. (Used abbreviations: MET - muscle energy technique; PNF - proprioceptive neuromuscular facilitation; NMR - neuromuscular re-education; a/p - anterior to posterior; p/a - posterior to anterior; FMP: functional movement pattern)   · Supine left anterior pelvis and hip soft tissue mobilization through iliacus and psoas, inguinal ligament  · Supine left LE long axis distraction  · Prone hip on axis mobilization bilaterally with manual resistance into hip ER/IR, followed with NMR  · Prone left sacral base p/a mobilization with FMP of hip ER/IR, followed with NMR      [facility] Portal  Treatment/Session Assessment:    · Response to Treatment:  Decreased restrictions in left anterior pelvis, improving hip strengthening. · Compliance with Program/Exercises: compliant all of the time. · Recommendations/Intent for next treatment session: \"Next visit will focus on advancements to more challenging activities\".   Total Treatment Duration: 60 minutes   PT Patient Time In/Time Out  Time In: 0830  Time Out: [facility] Karlo Fig[facility]a, KATHLEEN

## 2018-07-09 ENCOUNTER — HOSPITAL ENCOUNTER (OUTPATIENT)
Dept: PHYSICAL THERAPY | Age: 58
Discharge: HOME | End: 2018-07-09
Attending: FAMILY MEDICINE
Payer: COMMERCIAL

## 2018-07-09 PROCEDURE — 97140 MANUAL THERAPY 1/> REGIONS: CPT

## 2018-07-09 PROCEDURE — 97110 THERAPEUTIC EXERCISES: CPT

## 2018-07-10 NOTE — PROGRESS NOTES
Ricky Smith .  : 1960  Primary: [facility]  Secondary:  [facility] at [address], [address]  [phone]   [phone]        OUTPATIENT PHYSICAL THERAPY:Daily Note 2018    ICD-10: Treatment Diagnosis: stiffness in joint, left hip M25.652  ICD-10: Treatment Diagnosis: difficulty walking R26.2  ICD-10: Treatment Diagnosis: strain of muscle, fascia and tendon of hip, sequela S76.012S    Precautions/Allergies:   Bactrim [sulfamethoxazole-trimethoprim]   Fall Risk Score: 2 (? 5 = High Risk)  MD Orders: evaluate and treat for left groin pain MEDICAL/REFERRING DIAGNOSIS:  Muscle strain [T14. 8XXA]   DATE OF ONSET: 2018  REFERRING PHYSICIAN: Randy Goodman MD  RETURN PHYSICIAN APPOINTMENT: as needed     INITIAL ASSESSMENT:  Mr. Kal Jackson is a 62 y.o. male who presents to physical therapy with insidious onset of left groin pain since 2018. 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 He presents with soft tissue restrictions and muscle weakness and inhibition of his core stabilizers. He demonstrates an antalgic gait secondary to pain and restricted movements through his pelvis. He would benefit from skilled physical therapy to improve overall mobility and function with his ADLs. PROBLEM LIST (Impacting functional limitations):  1. Decreased Strength  2. Decreased ADL/Functional Activities  3. Decreased Transfer Abilities  4. Decreased Ambulation Ability/Technique  5. Decreased Balance  6. Increased Pain  7. Decreased Activity Tolerance  8. Decreased Flexibility/Joint Mobility INTERVENTIONS PLANNED:  1. Balance Exercise  2. Gait Training  3. Home Exercise Program (HEP)  4. Manual Therapy  5. Neuromuscular Re-education/Strengthening  6. Range of Motion (ROM)  7. Therapeutic Activites  8.  Therapeutic Exercise/Strengthening   TREATMENT PLAN:  Effective Dates: 6/6/2018 TO 9/4/2018 (90 days). Frequency/Duration:1 visit every other week for 90 Days (secondary to patient's work schedule)  GOALS: (Goals have been discussed and agreed upon with patient.)  Discharge Goals: Time Frame: 90 days  1. Patient demonstrates independence with his HEP without verbal cueing from therapist.  2. Patient able to ambulate for 1 hour without onset of left groin pain. 3. Patient able to get dressed and put shoes on without discomfort in left groin. 4. Improve LEFS outcome measure score from 36/80 to 70/80 to improve ADLs. Rehabilitation Potential For Stated Goals: Excellent  Regarding Miguel Rivera Sr.'s therapy, I certify that the treatment plan above will be carried out by a therapist or under their direction. Thank you for this referral,  Rosalee Alford PT                 The information in this section was collected on 6/6/18 (except where otherwise noted). HISTORY:   History of Present Injury/Illness (Reason for Referral): Insidious onset of left groin pain in March 2018, noted it one morning as he woke up and challenged with lifting leg to put his shoes and socks on. Has also noted difficulty with stairs and prolonged walking. Increased tension noted when walking dogs and any pulling on the leash. He notes he has a history of two hernia repairs in the [de-identified] and 90s, however with assessment from his MD, he recommended physical therapy. 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 Prolonged standing and walking at work can irritated his left groin. Patient denies dizziness, drop attacks, numbness/tingling, bowel/bladder dysfunction and/or unexplained weight gain/loss. Patient reports no testicular pain. Past Medical History/Comorbidities:   Mr. Sujatha Naranjo  has a past medical history of Chest pain (8/11/2016); Chronic cellulitis; Diabetes ([address] History of kidney stones;  Hypertension; Obesity; and Unspecified adverse effect of anesthesia (1997). Mr. Philip Nam  has a past surgical history that includes hx urological (2002); hx cervical fusion; hx cervical fusion (6-20-12); hx hernia repair; pr sinus surgery proc unlisted (1997); hx orthopaedic (Right); hx mohs procedure (Right); hx knee arthroscopy (Right, 2012); and hx knee arthroscopy (Left, 2013). 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 Dominant Side:         RIGHT  Current Medications:       Current Outpatient Prescriptions:     glimepiride (AMARYL) 2 mg tablet, TAKE 1 TABLET BY MOUTH TWO (2) TIMES A DAY., Disp: 90 Tab, Rfl: 1    albuterol (PROVENTIL VENTOLIN) 2.5 mg /3 mL (0.083 %) nebulizer solution, 3 mL by Nebulization route every four (4) hours as needed for Wheezing., Disp: 24 Each, Rfl: 5    BENICAR HCT 40-12.5 mg per tablet, TAKE 1 TABLET BY MOUTH EVERY DAY, Disp: 90 Tab, Rfl: 1    BREO ELLIPTA 200-25 mcg/dose inhaler, USE 1 INHALATION DAILY, RINSE MOUTH AFTER USE, Disp: 1 Inhaler, Rfl: 2    tadalafil (CIALIS) 5 mg tablet, Take 1 Tab by mouth daily. , Disp: 90 Tab, Rfl: 3    metFORMIN (GLUCOPHAGE) 1,000 mg tablet, TAKE 1 TAB BY MOUTH TWO (2) TIMES DAILY (WITH MEALS). , Disp: 180 Tab, Rfl: 1    valACYclovir (VALTREX) 1 gram tablet, TAKE 1 TAB BY MOUTH DAILY. , Disp: 30 Tab, Rfl: 7    multivitamin (ONE A DAY) tablet, Take 1 Tab by mouth daily. , Disp: , Rfl:     aspirin 81 mg chewable tablet, Take 1 Tab by mouth daily. , Disp: 30 Tab, Rfl: 0    albuterol (PROVENTIL HFA, VENTOLIN HFA, PROAIR HFA) 90 mcg/actuation inhaler, Take 2 Puffs by inhalation every four (4) hours as needed.  Indications: ACUTE ASTHMA ATTACK, Disp: 3 Inhaler, Rfl: 1    cpap machine kit, , Disp: , Rfl:     montelukast (SINGULAIR) 10 mg tablet, Take 10 mg by mouth daily., Disp: , Rfl:    Date Last Reviewed:  7/9/2018     Number of Personal Factors/Comorbidities that affect the Plan of Care: 1-2: MODERATE COMPLEXITY   EXAMINATION:   Observation/Orthostatic Postural Assessment:          Patient denies any LE pain, paresthesia or symptoms. Patient denies any increase of symptoms with cough, sneeze or valsalva. Patient denies any saddle paresthesia or bowel/bladder deficits. Patient exhibits a increased lumbar lordosis and neutral thoracic kyphosis. Lower extremity weight bearing is slight increased left. Shoulder symmetry exhibits mild protraction bilaterally. Observation of gait indicates increased bilateral heel strike and side bending through torso (patient is a pelvic and hip walker versus an efficient trunk walker). Lower quadrant bony landmarks are left iliac crest elevated. Leg swing for innominate mobility indicates left restricted in innominate extension and flexion. Vertical compression test (VCT) for alignment is 3. Elbow flexion test (EFT) for motor activation is 3. Soft tissue observation indicates restrictions through viscera, bilateral iliacus, psoas major, left rectus femoris and piriformis. .  Palpation: Myofascial assessment indicates restrictions through thoracodosal fascia and visceral restrictions. Muscle length testing in modified Luis Eduardo position exhibits restrictions left. Hamstring flexibility tested supine with straight leg raise (SLR) is left 50 degrees, right 55 degrees. Piriformis length is restricted left. Quadriceps flexibility tested prone with heel to buttock is restricted left. Ely test is positive for rectus femoris tightness. Gastrocnemius and soleus flexibility are WFL. Sacrotuberous ligament is not tested today. Sacrococcygeal junction exhibits not tested today. Body of coccyx is not tested today.   ROM: Passive trunk rotation is 80% available to the R and 80% available to the L. Kinetic testing in standing including forward bend test is positive left. Marcher's test is positive left. Pelvic shear test is standing exhibits decreased excursion of left shear with a hard end feel. Spring testing of sacrum exhibits decreased a/a mobilization of left sacral base. Spring testing of innominates exhibits decreased left innominate spring into extension. AROM (PROM) Right Left   Hip flexion/Innominate flexion 85 70   Hip extension/Innominate extension Lacks 5 Lacks 10   Hip external rotation (ER) 20 20   Hip internal rotation (IR) 15 15     Strength: Lumbar protective mechanism (LPM) are not tested today for initiation. LPM Strength */5   R anterior to posterior diagonal    L anterior to posterior diagonal    R posterior to anterior diagonal    L posterior to anterior diagonal      Manual Muscle Test (*/5) Right Left   Knee extension 5 5   Knee flexion 5 5   Hip flexion 5 4   Hip ER 5 4   Hip IR 5 4   Hip extension 4 4-   Hip abduction 4 4-   Hip adduction 5 4   Ankle DF 5 5   Ankle PF 5 5   Core stability 4-/5     Special Tests:  Sharlette Radhika test is positive left. Scours test is negative  Neurological Screen:  Myotomes: Key muscle strength testing through bilateral LE is intact. Dermatomes: Sensation testing through bilateral lower quadrants for light touch is intact. Reflexes: Patellar (L4) and Achilles (S1) are not tested today. Neural tension tests: Passive straight leg raise (SLR) test is negative. Slump test is negative. Functional Mobility:  Challenged with ambulation, hip flexion activities and prolonged weight bearing. Body Structures Involved:  1. Bones  2. Joints  3. Muscles Body Functions Affected:  1. Sensory/Pain  2. Neuromusculoskeletal  3. Movement Related Activities and Participation Affected:  1. General Tasks and Demands  2. Mobility  3. Domestic Life  4.  Community, Social and [place]   Number of elements (examined above) that affect the Plan of Care: 3: MODERATE COMPLEXITY   CLINICAL PRESENTATION:   Presentation: Evolving clinical presentation with changing clinical characteristics: MODERATE COMPLEXITY   CLINICAL DECISION MAKING:   Outcome Measure: Tool Used: Lower Extremity Functional Scale (LEFS)  Score:  Initial: 36/80 Most Recent: X/80 (Date: -- )   Interpretation of Score: 20 questions each scored on a 5 point scale with 0 representing \"extreme difficulty or unable to perform\" and 4 representing \"no difficulty\". The lower the score, the greater the functional disability. 80/80 represents no disability. Minimal detectable change is 9 points. Medical Necessity:   · Patient is expected to demonstrate progress in strength, range of motion, balance, coordination and functional technique to increase independence with ADLs, ambulation and work activities. Reason for Services/Other Comments:  · Patient continues to require skilled intervention due to challenged with ADLs and ambulation secondary to left groin pain. Use of outcome tool(s) and clinical judgement create a POC that gives a: Questionable prediction of patient's progress: MODERATE COMPLEXITY            TREATMENT:   (In addition to Assessment/Re-Assessment sessions the following treatments were rendered)  Pre-treatment Symptoms/Complaints:  Patient notes continued decreased pain in left groin and has been consistent with his HEP. Pain: Initial:   Pain Intensity 1: 3  Pain Location 1: Groin  Pain Orientation 1: Left  Post Session:  2/10     Therapeutic Exercise: (25 Minutes):  Exercises per grid below to improve mobility, strength, balance and coordination. Required minimal verbal and manual cues to promote proper body alignment, promote proper body posture and promote proper body mechanics. Progressed resistance, range, repetitions and complexity of movement as indicated. Date:  7/9/2018   Activity/Exercise Parameters   Review sleeping positions X 5 minutes   Supine hip release X 30 seconds, 3 positions.     Modified keanu hip flexor stretch X 5 reps, 20 sec holds blilaterally   1/2 prone hip extension X 15 reps 5 sec holds   Review gait and walking program    Quadriped arch/sag X 10 reps, 5 sec holds   Quadriped hip extension X 10 reps, 5 sec holds   Standing hip abduction X 10 reps, lateral steps green band   Standing anterior elevation/posteriro depression of pelvis X 10 reps BLE         Manual Therapy (    Soft Tissue Mobilization Duration  Duration: 35 Minutes): Manual techniques to facilitate improved motion and decreased pain. (Used abbreviations: MET - muscle energy technique; PNF - proprioceptive neuromuscular facilitation; NMR - neuromuscular re-education; a/p - anterior to posterior; p/a - posterior to anterior; FMP: functional movement pattern)   · Supine left anterior pelvis and hip soft tissue mobilization through iliacus and psoas, inguinal ligament  · Supine left LE long axis distraction  · Prone hip on axis mobilization bilaterally with manual resistance into hip ER/IR, followed with NMR  · Prone left sacral base p/a mobilization with FMP of hip ER/IR, followed with NMR  · Side lying right for left innominate extension and manual hip flexor stretches. [facility] Portal  Treatment/Session Assessment:    · Response to Treatment:  Improving overall mobility in pelvis and hip extension, increased core and hip strength noted. · Compliance with Program/Exercises: compliant all of the time. · Recommendations/Intent for next treatment session: \"Next visit will focus on advancements to more challenging activities\".   Total Treatment Duration: 60 minutes   PT Patient Time In/Time Out  Time In: 1730  Time Out: 3852 [address] [place], [place]

## 2018-07-23 ENCOUNTER — HOSPITAL ENCOUNTER (OUTPATIENT)
Dept: PHYSICAL THERAPY | Age: 58
Discharge: HOME | End: 2018-07-23
Attending: FAMILY MEDICINE
Payer: COMMERCIAL

## 2018-07-23 PROCEDURE — 97110 THERAPEUTIC EXERCISES: CPT

## 2018-07-23 PROCEDURE — 97140 MANUAL THERAPY 1/> REGIONS: CPT

## 2018-07-23 NOTE — PROGRESS NOTES
Hanna Fabian Vásquez.  : 1960  Primary: [facility]  Secondary:  [facility] at [address]  [phone]   [phone]        OUTPATIENT PHYSICAL THERAPY:Daily Note 2018    ICD-10: Treatment Diagnosis: stiffness in joint, left hip M25.652  ICD-10: Treatment Diagnosis: difficulty walking R26.2  ICD-10: Treatment Diagnosis: strain of muscle, fascia and tendon of hip, sequela S76.012S    Precautions/Allergies:   Bactrim [sulfamethoxazole-trimethoprim]   Fall Risk Score: 2 (? 5 = High Risk)  MD Orders: evaluate and treat for left groin pain MEDICAL/REFERRING DIAGNOSIS:  Muscle strain [T14. 8XXA]   DATE OF ONSET: 2018  REFERRING PHYSICIAN: Garfield Solares MD  RETURN PHYSICIAN APPOINTMENT: as needed     INITIAL ASSESSMENT:  Mr. Miryam Orellana is a 62 y.o. male who presents to physical therapy with insidious onset of left groin pain since 2018. 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 He presents with soft tissue restrictions and muscle weakness and inhibition of his core stabilizers. He demonstrates an antalgic gait secondary to pain and restricted movements through his pelvis. He would benefit from skilled physical therapy to improve overall mobility and function with his ADLs. PROBLEM LIST (Impacting functional limitations):  1. Decreased Strength  2. Decreased ADL/Functional Activities  3. Decreased Transfer Abilities  4. Decreased Ambulation Ability/Technique  5. Decreased Balance  6. Increased Pain  7. Decreased Activity Tolerance  8. Decreased Flexibility/Joint Mobility INTERVENTIONS PLANNED:  1. Balance Exercise  2. Gait Training  3. Home Exercise Program (HEP)  4. Manual Therapy  5. Neuromuscular Re-education/Strengthening  6. Range of Motion (ROM)  7. Therapeutic Activites  8.  Therapeutic Exercise/Strengthening   TREATMENT PLAN:  Effective Dates: 6/6/2018 TO 9/4/2018 (90 days). Frequency/Duration:1 visit every other week for 90 Days (secondary to patient's work schedule)  GOALS: (Goals have been discussed and agreed upon with patient.)  Discharge Goals: Time Frame: 90 days  1. Patient demonstrates independence with his HEP without verbal cueing from therapist.  2. Patient able to ambulate for 1 hour without onset of left groin pain. 3. Patient able to get dressed and put shoes on without discomfort in left groin. 4. Improve LEFS outcome measure score from 36/80 to 70/80 to improve ADLs. Rehabilitation Potential For Stated Goals: Excellent  Regarding Micheal Musa Sr.'s therapy, I certify that the treatment plan above will be carried out by a therapist or under their direction. Thank you for this referral,  Unique Banuelos PT                 The information in this section was collected on 6/6/18 (except where otherwise noted). HISTORY:   History of Present Injury/Illness (Reason for Referral): Insidious onset of left groin pain in March 2018, noted it one morning as he woke up and challenged with lifting leg to put his shoes and socks on. Has also noted difficulty with stairs and prolonged walking. Increased tension noted when walking dogs and any pulling on the leash. He notes he has a history of two hernia repairs in the [de-identified] and 90s, however with assessment from his MD, he recommended physical therapy. 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 Prolonged standing and walking at work can irritated his left groin. Patient denies dizziness, drop attacks, numbness/tingling, bowel/bladder dysfunction and/or unexplained weight gain/loss. Patient reports no testicular pain. Past Medical History/Comorbidities:   Mr. Juli Triplett  has a past medical history of Chest pain (8/11/2016); Chronic cellulitis; Diabetes ([address] History of kidney stones;  Hypertension; Obesity; and Unspecified adverse effect of anesthesia (1997). Mr. Edith Cannon  has a past surgical history that includes hx urological (2002); hx cervical fusion; hx cervical fusion (6-20-12); hx hernia repair; pr sinus surgery proc unlisted (1997); hx orthopaedic (Right); hx mohs procedure (Right); hx knee arthroscopy (Right, 2012); and hx knee arthroscopy (Left, 2013). 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 Dominant Side:         RIGHT  Current Medications:       Current Outpatient Prescriptions:     doxycycline (ADOXA) 100 mg tablet, Take 1 Tab by mouth two (2) times a day., Disp: 20 Tab, Rfl: 0    metFORMIN (GLUCOPHAGE) 1,000 mg tablet, TAKE 1 TAB BY MOUTH TWO (2) TIMES DAILY (WITH MEALS). , Disp: 180 Tab, Rfl: 1    glimepiride (AMARYL) 2 mg tablet, TAKE 1 TABLET BY MOUTH TWO (2) TIMES A DAY., Disp: 90 Tab, Rfl: 1    albuterol (PROVENTIL VENTOLIN) 2.5 mg /3 mL (0.083 %) nebulizer solution, 3 mL by Nebulization route every four (4) hours as needed for Wheezing., Disp: 24 Each, Rfl: 5    BENICAR HCT 40-12.5 mg per tablet, TAKE 1 TABLET BY MOUTH EVERY DAY, Disp: 90 Tab, Rfl: 1    BREO ELLIPTA 200-25 mcg/dose inhaler, USE 1 INHALATION DAILY, RINSE MOUTH AFTER USE, Disp: 1 Inhaler, Rfl: 2    tadalafil (CIALIS) 5 mg tablet, Take 1 Tab by mouth daily. , Disp: 90 Tab, Rfl: 3    valACYclovir (VALTREX) 1 gram tablet, TAKE 1 TAB BY MOUTH DAILY. , Disp: 30 Tab, Rfl: 7    multivitamin (ONE A DAY) tablet, Take 1 Tab by mouth daily. , Disp: , Rfl:     aspirin 81 mg chewable tablet, Take 1 Tab by mouth daily. , Disp: 30 Tab, Rfl: 0    albuterol (PROVENTIL HFA, VENTOLIN HFA, PROAIR HFA) 90 mcg/actuation inhaler, Take 2 Puffs by inhalation every four (4) hours as needed.  Indications: ACUTE ASTHMA ATTACK, Disp: 3 Inhaler, Rfl: 1    cpap machine kit, , Disp: , Rfl:     montelukast (SINGULAIR) 10 mg tablet, Take 10 mg by mouth daily. , Disp: , Rfl:    Date Last Reviewed:  7/23/2018     Number of Personal Factors/Comorbidities that affect the Plan of Care: 1-2: MODERATE COMPLEXITY   EXAMINATION:   Observation/Orthostatic Postural Assessment:          Patient denies any LE pain, paresthesia or symptoms. Patient denies any increase of symptoms with cough, sneeze or valsalva. Patient denies any saddle paresthesia or bowel/bladder deficits. Patient exhibits a increased lumbar lordosis and neutral thoracic kyphosis. Lower extremity weight bearing is slight increased left. Shoulder symmetry exhibits mild protraction bilaterally. Observation of gait indicates increased bilateral heel strike and side bending through torso (patient is a pelvic and hip walker versus an efficient trunk walker). Lower quadrant bony landmarks are left iliac crest elevated. Leg swing for innominate mobility indicates left restricted in innominate extension and flexion. Vertical compression test (VCT) for alignment is 3. Elbow flexion test (EFT) for motor activation is 3. Soft tissue observation indicates restrictions through viscera, bilateral iliacus, psoas major, left rectus femoris and piriformis. .  Palpation: Myofascial assessment indicates restrictions through thoracodosal fascia and visceral restrictions. Muscle length testing in modified Luis Eduardo position exhibits restrictions left. Hamstring flexibility tested supine with straight leg raise (SLR) is left 50 degrees, right 55 degrees. Piriformis length is restricted left. Quadriceps flexibility tested prone with heel to buttock is restricted left. Ely test is positive for rectus femoris tightness. Gastrocnemius and soleus flexibility are WFL. Sacrotuberous ligament is not tested today. Sacrococcygeal junction exhibits not tested today. Body of coccyx is not tested today.   ROM: Passive trunk rotation is 80% available to the R and 80% available to the L. Kinetic testing in standing including forward bend test is positive left. Marcher's test is positive left. Pelvic shear test is standing exhibits decreased excursion of left shear with a hard end feel. Spring testing of sacrum exhibits decreased a/a mobilization of left sacral base. Spring testing of innominates exhibits decreased left innominate spring into extension. AROM (PROM) Right Left   Hip flexion/Innominate flexion 85 70   Hip extension/Innominate extension Lacks 5 Lacks 10   Hip external rotation (ER) 20 20   Hip internal rotation (IR) 15 15     Strength: Lumbar protective mechanism (LPM) are not tested today for initiation. LPM Strength */5   R anterior to posterior diagonal    L anterior to posterior diagonal    R posterior to anterior diagonal    L posterior to anterior diagonal      Manual Muscle Test (*/5) Right Left   Knee extension 5 5   Knee flexion 5 5   Hip flexion 5 4   Hip ER 5 4   Hip IR 5 4   Hip extension 4 4-   Hip abduction 4 4-   Hip adduction 5 4   Ankle DF 5 5   Ankle PF 5 5   Core stability 4-/5     Special Tests:  Corral Labsella Running test is positive left. Scours test is negative  Neurological Screen:  Myotomes: Key muscle strength testing through bilateral LE is intact. Dermatomes: Sensation testing through bilateral lower quadrants for light touch is intact. Reflexes: Patellar (L4) and Achilles (S1) are not tested today. Neural tension tests: Passive straight leg raise (SLR) test is negative. Slump test is negative. Functional Mobility:  Challenged with ambulation, hip flexion activities and prolonged weight bearing. Body Structures Involved:  1. Bones  2. Joints  3. Muscles Body Functions Affected:  1. Sensory/Pain  2. Neuromusculoskeletal  3. Movement Related Activities and Participation Affected:  1. General Tasks and Demands  2. Mobility  3. Domestic Life  4.  Community, Social and [place]   Number of elements (examined above) that affect the Plan of Care: 3: MODERATE COMPLEXITY   CLINICAL PRESENTATION:   Presentation: Evolving clinical presentation with changing clinical characteristics: MODERATE COMPLEXITY   CLINICAL DECISION MAKING:   Outcome Measure: Tool Used: Lower Extremity Functional Scale (LEFS)  Score:  Initial: 36/80 Most Recent: X/80 (Date: -- )   Interpretation of Score: 20 questions each scored on a 5 point scale with 0 representing \"extreme difficulty or unable to perform\" and 4 representing \"no difficulty\". The lower the score, the greater the functional disability. 80/80 represents no disability. Minimal detectable change is 9 points. Medical Necessity:   · Patient is expected to demonstrate progress in strength, range of motion, balance, coordination and functional technique to increase independence with ADLs, ambulation and work activities. Reason for Services/Other Comments:  · Patient continues to require skilled intervention due to challenged with ADLs and ambulation secondary to left groin pain. Use of outcome tool(s) and clinical judgement create a POC that gives a: Questionable prediction of patient's progress: MODERATE COMPLEXITY            TREATMENT:   (In addition to Assessment/Re-Assessment sessions the following treatments were rendered)  Pre-treatment Symptoms/Complaints:  Patient notes had bad case of fire ants and poison ivy on his right side and has been off of work for almost two weeks. Notes he had more time to focus on his stretches and exercises and continues to note less pain in left groin. Pain: Initial:   Pain Intensity 1: 2  Pain Location 1: Groin  Pain Orientation 1: Left  Post Session:  1/10     Therapeutic Exercise: (25 Minutes):  Exercises per grid below to improve mobility, strength, balance and coordination. Required minimal verbal and manual cues to promote proper body alignment, promote proper body posture and promote proper body mechanics.   Progressed resistance, range, repetitions and complexity of movement as indicated. Date:  7/23/2018   Activity/Exercise Parameters   Review HEP X 5 minutes   Supine hip release X 30 seconds, 3 positions. Modified keanu hip flexor stretch X 5 reps, 20 sec holds blilaterally   1/2 prone hip extension X 15 reps 5 sec holds   Review gait and walking program    Quadriped arch/sag X 10 reps, 5 sec holds   Quadriped hip extension X 10 reps, 5 sec holds   Standing hip abduction X 10 reps, lateral steps green band   Standing anterior elevation/posteriro depression of pelvis X 10 reps BLE   Standing posture X 10 minute review, neutral posture/core activation. Manual Therapy (    Soft Tissue Mobilization Duration  Duration: 35 Minutes): Manual techniques to facilitate improved motion and decreased pain. (Used abbreviations: MET - muscle energy technique; PNF - proprioceptive neuromuscular facilitation; NMR - neuromuscular re-education; a/p - anterior to posterior; p/a - posterior to anterior; FMP: functional movement pattern)   · Supine left anterior pelvis and hip soft tissue mobilization through iliacus and psoas, inguinal ligament  · Supine left LE long axis distraction  · Prone hip on axis mobilization bilaterally with manual resistance into hip ER/IR, followed with NMR  · Prone left sacral base p/a mobilization with FMP of hip ER/IR, followed with NMR       [facility] Portal  Treatment/Session Assessment:    · Response to Treatment:  Improving awareness of standing posture and activation of core with pelvic and torso positions, improving hip strength. · Compliance with Program/Exercises: compliant all of the time. · Recommendations/Intent for next treatment session: \"Next visit will focus on advancements to more challenging activities\".   Total Treatment Duration: 60 minutes   PT Patient Time In/Time Out  Time In: 0730  Time Out: [address] Lazaro Goodell, KATHLEEN

## 2018-08-06 ENCOUNTER — HOSPITAL ENCOUNTER (OUTPATIENT)
Dept: PHYSICAL THERAPY | Age: 58
Discharge: HOME | End: 2018-08-06
Attending: FAMILY MEDICINE
Payer: COMMERCIAL

## 2018-08-06 PROCEDURE — 97110 THERAPEUTIC EXERCISES: CPT

## 2018-08-06 PROCEDURE — 97140 MANUAL THERAPY 1/> REGIONS: CPT

## 2018-08-06 NOTE — PROGRESS NOTES
Shahla Gasca .  : 1960  Primary: [facility]  Secondary:  [facility] at [address]  [phone]   [phone]        OUTPATIENT PHYSICAL THERAPY:Daily Note 2018    ICD-10: Treatment Diagnosis: stiffness in joint, left hip M25.652  ICD-10: Treatment Diagnosis: difficulty walking R26.2  ICD-10: Treatment Diagnosis: strain of muscle, fascia and tendon of hip, sequela S76.012S    Precautions/Allergies:   Bactrim [sulfamethoxazole-trimethoprim]   Fall Risk Score: 2 (? 5 = High Risk)  MD Orders: evaluate and treat for left groin pain MEDICAL/REFERRING DIAGNOSIS:  Muscle strain [T14. 8XXA]   DATE OF ONSET: 2018  REFERRING PHYSICIAN: Gypsy Kumar MD  RETURN PHYSICIAN APPOINTMENT: as needed     INITIAL ASSESSMENT:  Mr. Lizett Suresh is a 62 y.o. male who presents to physical therapy with insidious onset of left groin pain since 2018. 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 He presents with soft tissue restrictions and muscle weakness and inhibition of his core stabilizers. He demonstrates an antalgic gait secondary to pain and restricted movements through his pelvis. He would benefit from skilled physical therapy to improve overall mobility and function with his ADLs. PROBLEM LIST (Impacting functional limitations):  1. Decreased Strength  2. Decreased ADL/Functional Activities  3. Decreased Transfer Abilities  4. Decreased Ambulation Ability/Technique  5. Decreased Balance  6. Increased Pain  7. Decreased Activity Tolerance  8. Decreased Flexibility/Joint Mobility INTERVENTIONS PLANNED:  1. Balance Exercise  2. Gait Training  3. Home Exercise Program (HEP)  4. Manual Therapy  5. Neuromuscular Re-education/Strengthening  6. Range of Motion (ROM)  7. Therapeutic Activites  8.  Therapeutic Exercise/Strengthening   TREATMENT PLAN:  Effective Dates: 6/6/2018 TO 9/4/2018 (90 days). Frequency/Duration:1 visit every other week for 90 Days (secondary to patient's work schedule)  GOALS: (Goals have been discussed and agreed upon with patient.)  Discharge Goals: Time Frame: 90 days  1. Patient demonstrates independence with his HEP without verbal cueing from therapist.  2. Patient able to ambulate for 1 hour without onset of left groin pain. 3. Patient able to get dressed and put shoes on without discomfort in left groin. 4. Improve LEFS outcome measure score from 36/80 to 70/80 to improve ADLs. Rehabilitation Potential For Stated Goals: Excellent  Regarding Jesse Byrd Sr.'s therapy, I certify that the treatment plan above will be carried out by a therapist or under their direction. Thank you for this referral,  Og Reid, PT                 The information in this section was collected on 6/6/18 (except where otherwise noted). HISTORY:   History of Present Injury/Illness (Reason for Referral): Insidious onset of left groin pain in March 2018, noted it one morning as he woke up and challenged with lifting leg to put his shoes and socks on. Has also noted difficulty with stairs and prolonged walking. Increased tension noted when walking dogs and any pulling on the leash. He notes he has a history of two hernia repairs in the [de-identified] and 90s, however with assessment from his MD, he recommended physical therapy. 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 Prolonged standing and walking at work can irritated his left groin. Patient denies dizziness, drop attacks, numbness/tingling, bowel/bladder dysfunction and/or unexplained weight gain/loss. Patient reports no testicular pain. Past Medical History/Comorbidities:   Mr. Sherine Ramos  has a past medical history of Chest pain (8/11/2016); Chronic cellulitis; Diabetes ([address] History of kidney stones;  Hypertension; Obesity; and Unspecified adverse effect of anesthesia (1997). Mr. Kyle De La O  has a past surgical history that includes hx urological (2002); hx cervical fusion; hx cervical fusion (6-20-12); hx hernia repair; pr sinus surgery proc unlisted (1997); hx orthopaedic (Right); hx mohs procedure (Right); hx knee arthroscopy (Right, 2012); and hx knee arthroscopy (Left, 2013). 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 Dominant Side:         RIGHT  Current Medications:       Current Outpatient Prescriptions:     doxycycline (ADOXA) 100 mg tablet, Take 1 Tab by mouth two (2) times a day., Disp: 20 Tab, Rfl: 0    metFORMIN (GLUCOPHAGE) 1,000 mg tablet, TAKE 1 TAB BY MOUTH TWO (2) TIMES DAILY (WITH MEALS). , Disp: 180 Tab, Rfl: 1    glimepiride (AMARYL) 2 mg tablet, TAKE 1 TABLET BY MOUTH TWO (2) TIMES A DAY., Disp: 90 Tab, Rfl: 1    albuterol (PROVENTIL VENTOLIN) 2.5 mg /3 mL (0.083 %) nebulizer solution, 3 mL by Nebulization route every four (4) hours as needed for Wheezing., Disp: 24 Each, Rfl: 5    BENICAR HCT 40-12.5 mg per tablet, TAKE 1 TABLET BY MOUTH EVERY DAY, Disp: 90 Tab, Rfl: 1    BREO ELLIPTA 200-25 mcg/dose inhaler, USE 1 INHALATION DAILY, RINSE MOUTH AFTER USE, Disp: 1 Inhaler, Rfl: 2    tadalafil (CIALIS) 5 mg tablet, Take 1 Tab by mouth daily. , Disp: 90 Tab, Rfl: 3    valACYclovir (VALTREX) 1 gram tablet, TAKE 1 TAB BY MOUTH DAILY. , Disp: 30 Tab, Rfl: 7    multivitamin (ONE A DAY) tablet, Take 1 Tab by mouth daily. , Disp: , Rfl:     aspirin 81 mg chewable tablet, Take 1 Tab by mouth daily. , Disp: 30 Tab, Rfl: 0    albuterol (PROVENTIL HFA, VENTOLIN HFA, PROAIR HFA) 90 mcg/actuation inhaler, Take 2 Puffs by inhalation every four (4) hours as needed.  Indications: ACUTE ASTHMA ATTACK, Disp: 3 Inhaler, Rfl: 1    cpap machine kit, , Disp: , Rfl:     montelukast (SINGULAIR) 10 mg tablet, Take 10 mg by mouth daily. , Disp: , Rfl:    Date Last Reviewed:  8/6/2018     Number of Personal Factors/Comorbidities that affect the Plan of Care: 1-2: MODERATE COMPLEXITY   EXAMINATION:   Observation/Orthostatic Postural Assessment:          Patient denies any LE pain, paresthesia or symptoms. Patient denies any increase of symptoms with cough, sneeze or valsalva. Patient denies any saddle paresthesia or bowel/bladder deficits. Patient exhibits a increased lumbar lordosis and neutral thoracic kyphosis. Lower extremity weight bearing is slight increased left. Shoulder symmetry exhibits mild protraction bilaterally. Observation of gait indicates increased bilateral heel strike and side bending through torso (patient is a pelvic and hip walker versus an efficient trunk walker). Lower quadrant bony landmarks are left iliac crest elevated. Leg swing for innominate mobility indicates left restricted in innominate extension and flexion. Vertical compression test (VCT) for alignment is 3. Elbow flexion test (EFT) for motor activation is 3. Soft tissue observation indicates restrictions through viscera, bilateral iliacus, psoas major, left rectus femoris and piriformis. .  Palpation: Myofascial assessment indicates restrictions through thoracodosal fascia and visceral restrictions. Muscle length testing in modified Luis Eduardo position exhibits restrictions left. Hamstring flexibility tested supine with straight leg raise (SLR) is left 50 degrees, right 55 degrees. Piriformis length is restricted left. Quadriceps flexibility tested prone with heel to buttock is restricted left. Ely test is positive for rectus femoris tightness. Gastrocnemius and soleus flexibility are WFL. Sacrotuberous ligament is not tested today. Sacrococcygeal junction exhibits not tested today. Body of coccyx is not tested today.   ROM: Passive trunk rotation is 80% available to the R and 80% available to the L. Kinetic testing in standing including forward bend test is positive left. Marcher's test is positive left. Pelvic shear test is standing exhibits decreased excursion of left shear with a hard end feel. Spring testing of sacrum exhibits decreased a/a mobilization of left sacral base. Spring testing of innominates exhibits decreased left innominate spring into extension. AROM (PROM) Right Left   Hip flexion/Innominate flexion 85 70   Hip extension/Innominate extension Lacks 5 Lacks 10   Hip external rotation (ER) 20 20   Hip internal rotation (IR) 15 15     Strength: Lumbar protective mechanism (LPM) are not tested today for initiation. LPM Strength */5   R anterior to posterior diagonal    L anterior to posterior diagonal    R posterior to anterior diagonal    L posterior to anterior diagonal      Manual Muscle Test (*/5) Right Left   Knee extension 5 5   Knee flexion 5 5   Hip flexion 5 4   Hip ER 5 4   Hip IR 5 4   Hip extension 4 4-   Hip abduction 4 4-   Hip adduction 5 4   Ankle DF 5 5   Ankle PF 5 5   Core stability 4-/5     Special Tests:  Pablo Collet test is positive left. Scours test is negative  Neurological Screen:  Myotomes: Key muscle strength testing through bilateral LE is intact. Dermatomes: Sensation testing through bilateral lower quadrants for light touch is intact. Reflexes: Patellar (L4) and Achilles (S1) are not tested today. Neural tension tests: Passive straight leg raise (SLR) test is negative. Slump test is negative. Functional Mobility:  Challenged with ambulation, hip flexion activities and prolonged weight bearing. Body Structures Involved:  1. Bones  2. Joints  3. Muscles Body Functions Affected:  1. Sensory/Pain  2. Neuromusculoskeletal  3. Movement Related Activities and Participation Affected:  1. General Tasks and Demands  2. Mobility  3. Domestic Life  4.  Community, Social and [place]   Number of elements (examined above) that affect the Plan of Care: 3: MODERATE COMPLEXITY   CLINICAL PRESENTATION:   Presentation: Evolving clinical presentation with changing clinical characteristics: MODERATE COMPLEXITY   CLINICAL DECISION MAKING:   Outcome Measure: Tool Used: Lower Extremity Functional Scale (LEFS)  Score:  Initial: 36/80 Most Recent: X/80 (Date: -- )   Interpretation of Score: 20 questions each scored on a 5 point scale with 0 representing \"extreme difficulty or unable to perform\" and 4 representing \"no difficulty\". The lower the score, the greater the functional disability. 80/80 represents no disability. Minimal detectable change is 9 points. Medical Necessity:   · Patient is expected to demonstrate progress in strength, range of motion, balance, coordination and functional technique to increase independence with ADLs, ambulation and work activities. Reason for Services/Other Comments:  · Patient continues to require skilled intervention due to challenged with ADLs and ambulation secondary to left groin pain. Use of outcome tool(s) and clinical judgement create a POC that gives a: Questionable prediction of patient's progress: MODERATE COMPLEXITY            TREATMENT:   (In addition to Assessment/Re-Assessment sessions the following treatments were rendered)  Pre-treatment Symptoms/Complaints:  Patient continues to have less left groin pain, however noted some sciatic like symptoms into his RLE. Challenged with stairs. Pain: Initial:   Pain Intensity 1: 3  Pain Location 1: Pelvis  Pain Orientation 1: Left, Right  Post Session:  1/10     Therapeutic Exercise: (20 Minutes):  Exercises per grid below to improve mobility, strength, balance and coordination. Required minimal verbal and manual cues to promote proper body alignment, promote proper body posture and promote proper body mechanics. Progressed resistance, range, repetitions and complexity of movement as indicated.    Date:  8/6/2018   Activity/Exercise Parameters   Review HEP X 5 minutes   Supine hip release X 30 seconds, 3 positions. Modified keanu hip flexor stretch X 5 reps, 20 sec holds blilaterally   1/2 prone hip extension X 15 reps 5 sec holds   Review gait and walking program    Quadriped arch/sag X 10 reps, 5 sec holds   Quadriped hip extension X 10 reps, 5 sec holds   Standing hip abduction X 10 reps, lateral steps green band   Standing anterior elevation/posteriro depression of pelvis X 10 reps BLE   Standing posture X 10 minute review, neutral posture/core activation. Manual Therapy (    Soft Tissue Mobilization Duration  Duration: 40 Minutes): Manual techniques to facilitate improved motion and decreased pain. (Used abbreviations: MET - muscle energy technique; PNF - proprioceptive neuromuscular facilitation; NMR - neuromuscular re-education; a/p - anterior to posterior; p/a - posterior to anterior; FMP: functional movement pattern)   · Supine left anterior pelvis and hip soft tissue mobilization through iliacus and psoas, inguinal ligament  · Supine left LE long axis distraction  · Prone hip on axis mobilization bilaterally with manual resistance into hip ER/IR, followed with NMR  · Prone left sacral base p/a mobilization with FMP of hip ER/IR, followed with NMR   · Prone bilateral piriformis soft tissue mobilization with FMP of hip ER/IR, followed with NMR  · Side lying bilaterally for pelvic patterns PNF through anterior elevation/posterior depression. [facility]  Treatment/Session Assessment:    · Response to Treatment:  Decreased soft tissue restrictions in pelvis and lumbar spine, improved core and hip strength noted. · Compliance with Program/Exercises: compliant all of the time. · Recommendations/Intent for next treatment session: \"Next visit will focus on advancements to more challenging activities\".   Total Treatment Duration: 60 minutes   PT Patient Time In/Time Out  Time In: 0730  Time Out: 0830    Mary Moss PT

## 2018-08-20 ENCOUNTER — HOSPITAL ENCOUNTER (OUTPATIENT)
Dept: PHYSICAL THERAPY | Age: 58
Discharge: HOME | End: 2018-08-20
Attending: FAMILY MEDICINE
Payer: COMMERCIAL

## 2018-08-20 PROCEDURE — 97140 MANUAL THERAPY 1/> REGIONS: CPT

## 2018-08-20 PROCEDURE — 97110 THERAPEUTIC EXERCISES: CPT

## 2018-08-20 NOTE — PROGRESS NOTES
Richard Oliver Sr.  : 1960  Primary: [facility]  Secondary:  [facility]rapy Center at [address]  [address] [place] [address]  [phone]   [phone]        OUTPATIENT PHYSICAL THERAPY:Daily Note 2018    ICD-10: Treatment Diagnosis: stiffness in joint, left hip M25.652  ICD-10: Treatment Diagnosis: difficulty walking R26.2  ICD-10: Treatment Diagnosis: strain of muscle, fascia and tendon of hip, sequela S76.012S    Precautions/Allergies:   Bactrim [sulfamethoxazole-trimethoprim]   Fall Risk Score: 2 (? 5 = High Risk)  MD Orders: evaluate and treat for left groin pain MEDICAL/REFERRING DIAGNOSIS:  Muscle strain [T14. 8XXA]   DATE OF ONSET: 2018  REFERRING PHYSICIAN: Abigail Mohan MD  RETURN PHYSICIAN APPOINTMENT: as needed     INITIAL ASSESSMENT:  Mr. Jacqui Lua is a 62 y.o. male who presents to physical therapy with insidious onset of left groin pain since 2018. 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 He presents with soft tissue restrictions and muscle weakness and inhibition of his core stabilizers. He demonstrates an antalgic gait secondary to pain and restricted movements through his pelvis. He would benefit from skilled physical therapy to improve overall mobility and function with his ADLs. PROBLEM LIST (Impacting functional limitations):  1. Decreased Strength  2. Decreased ADL/Functional Activities  3. Decreased Transfer Abilities  4. Decreased Ambulation Ability/Technique  5. Decreased Balance  6. Increased Pain  7. Decreased Activity Tolerance  8. Decreased Flexibility/Joint Mobility INTERVENTIONS PLANNED:  1. Balance Exercise  2. Gait Training  3. Home Exercise Program (HEP)  4. Manual Therapy  5. Neuromuscular Re-education/Strengthening  6. Range of Motion (ROM)  7. Therapeutic Activites  8.  Therapeutic Exercise/Strengthening   TREATMENT PLAN:  Effective Dates: 6/6/2018 TO 9/4/2018 (90 days). Frequency/Duration:1 visit every other week for 90 Days (secondary to patient's work schedule)  GOALS: (Goals have been discussed and agreed upon with patient.)  Discharge Goals: Time Frame: 90 days  1. Patient demonstrates independence with his HEP without verbal cueing from therapist.  2. Patient able to ambulate for 1 hour without onset of left groin pain. 3. Patient able to get dressed and put shoes on without discomfort in left groin. 4. Improve LEFS outcome measure score from 36/80 to 70/80 to improve ADLs. Rehabilitation Potential For Stated Goals: Excellent  Regarding Philly Palomo Sr.'s therapy, I certify that the treatment plan above will be carried out by a therapist or under their direction. Thank you for this referral,  Nasir Beth PT                 The information in this section was collected on 6/6/18 (except where otherwise noted). HISTORY:   History of Present Injury/Illness (Reason for Referral): Insidious onset of left groin pain in March 2018, noted it one morning as he woke up and challenged with lifting leg to put his shoes and socks on. Has also noted difficulty with stairs and prolonged walking. Increased tension noted when walking dogs and any pulling on the leash. He notes he has a history of two hernia repairs in the [de-identified] and 90s, however with assessment from his MD, he recommended physical therapy. 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 Prolonged standing and walking at work can irritated his left groin. Patient denies dizziness, drop attacks, numbness/tingling, bowel/bladder dysfunction and/or unexplained weight gain/loss. Patient reports no testicular pain. Past Medical History/Comorbidities:   Mr. Emmett Whiteside  has a past medical history of Chest pain (8/11/2016); Chronic cellulitis; Diabetes ([address] History of kidney stones;  Hypertension; Obesity; and Unspecified adverse effect of anesthesia (1997). Mr. Tahira Sutherland  has a past surgical history that includes hx urological (2002); hx cervical fusion; hx cervical fusion (6-20-12); hx hernia repair; pr sinus surgery proc unlisted (1997); hx orthopaedic (Right); hx mohs procedure (Right); hx knee arthroscopy (Right, 2012); and hx knee arthroscopy (Left, 2013). 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 Dominant Side:         RIGHT  Current Medications:       Current Outpatient Prescriptions:     glimepiride (AMARYL) 2 mg tablet, TAKE 1 TABLET BY MOUTH TWO (2) TIMES A DAY., Disp: 90 Tab, Rfl: 1    doxycycline (ADOXA) 100 mg tablet, Take 1 Tab by mouth two (2) times a day., Disp: 20 Tab, Rfl: 0    metFORMIN (GLUCOPHAGE) 1,000 mg tablet, TAKE 1 TAB BY MOUTH TWO (2) TIMES DAILY (WITH MEALS). , Disp: 180 Tab, Rfl: 1    albuterol (PROVENTIL VENTOLIN) 2.5 mg /3 mL (0.083 %) nebulizer solution, 3 mL by Nebulization route every four (4) hours as needed for Wheezing., Disp: 24 Each, Rfl: 5    BENICAR HCT 40-12.5 mg per tablet, TAKE 1 TABLET BY MOUTH EVERY DAY, Disp: 90 Tab, Rfl: 1    BREO ELLIPTA 200-25 mcg/dose inhaler, USE 1 INHALATION DAILY, RINSE MOUTH AFTER USE, Disp: 1 Inhaler, Rfl: 2    tadalafil (CIALIS) 5 mg tablet, Take 1 Tab by mouth daily. , Disp: 90 Tab, Rfl: 3    valACYclovir (VALTREX) 1 gram tablet, TAKE 1 TAB BY MOUTH DAILY. , Disp: 30 Tab, Rfl: 7    multivitamin (ONE A DAY) tablet, Take 1 Tab by mouth daily. , Disp: , Rfl:     aspirin 81 mg chewable tablet, Take 1 Tab by mouth daily. , Disp: 30 Tab, Rfl: 0    albuterol (PROVENTIL HFA, VENTOLIN HFA, PROAIR HFA) 90 mcg/actuation inhaler, Take 2 Puffs by inhalation every four (4) hours as needed.  Indications: ACUTE ASTHMA ATTACK, Disp: 3 Inhaler, Rfl: 1    cpap machine kit, , Disp: , Rfl:     montelukast (SINGULAIR) 10 mg tablet, Take 10 mg by mouth daily. , Disp: , Rfl:    Date Last Reviewed:  8/20/2018     Number of Personal Factors/Comorbidities that affect the Plan of Care: 1-2: MODERATE COMPLEXITY   EXAMINATION:   Observation/Orthostatic Postural Assessment:          Patient denies any LE pain, paresthesia or symptoms. Patient denies any increase of symptoms with cough, sneeze or valsalva. Patient denies any saddle paresthesia or bowel/bladder deficits. Patient exhibits a increased lumbar lordosis and neutral thoracic kyphosis. Lower extremity weight bearing is slight increased left. Shoulder symmetry exhibits mild protraction bilaterally. Observation of gait indicates increased bilateral heel strike and side bending through torso (patient is a pelvic and hip walker versus an efficient trunk walker). Lower quadrant bony landmarks are left iliac crest elevated. Leg swing for innominate mobility indicates left restricted in innominate extension and flexion. Vertical compression test (VCT) for alignment is 3. Elbow flexion test (EFT) for motor activation is 3. Soft tissue observation indicates restrictions through viscera, bilateral iliacus, psoas major, left rectus femoris and piriformis. .  Palpation: Myofascial assessment indicates restrictions through thoracodosal fascia and visceral restrictions. Muscle length testing in modified Luis Eduardo position exhibits restrictions left. Hamstring flexibility tested supine with straight leg raise (SLR) is left 50 degrees, right 55 degrees. Piriformis length is restricted left. Quadriceps flexibility tested prone with heel to buttock is restricted left. Ely test is positive for rectus femoris tightness. Gastrocnemius and soleus flexibility are WFL. Sacrotuberous ligament is not tested today. Sacrococcygeal junction exhibits not tested today. Body of coccyx is not tested today.   ROM: Passive trunk rotation is 80% available to the R and 80% available to the L. Kinetic testing in standing including forward bend test is positive left. Marcher's test is positive left. Pelvic shear test is standing exhibits decreased excursion of left shear with a hard end feel. Spring testing of sacrum exhibits decreased a/a mobilization of left sacral base. Spring testing of innominates exhibits decreased left innominate spring into extension. AROM (PROM) Right Left   Hip flexion/Innominate flexion 85 70   Hip extension/Innominate extension Lacks 5 Lacks 10   Hip external rotation (ER) 20 20   Hip internal rotation (IR) 15 15     Strength: Lumbar protective mechanism (LPM) are not tested today for initiation. LPM Strength */5   R anterior to posterior diagonal    L anterior to posterior diagonal    R posterior to anterior diagonal    L posterior to anterior diagonal      Manual Muscle Test (*/5) Right Left   Knee extension 5 5   Knee flexion 5 5   Hip flexion 5 4   Hip ER 5 4   Hip IR 5 4   Hip extension 4 4-   Hip abduction 4 4-   Hip adduction 5 4   Ankle DF 5 5   Ankle PF 5 5   Core stability 4-/5     Special Tests:  Cherlynn [place] test is positive left. Scours test is negative  Neurological Screen:  Myotomes: Key muscle strength testing through bilateral LE is intact. Dermatomes: Sensation testing through bilateral lower quadrants for light touch is intact. Reflexes: Patellar (L4) and Achilles (S1) are not tested today. Neural tension tests: Passive straight leg raise (SLR) test is negative. Slump test is negative. Functional Mobility:  Challenged with ambulation, hip flexion activities and prolonged weight bearing. Body Structures Involved:  1. Bones  2. Joints  3. Muscles Body Functions Affected:  1. Sensory/Pain  2. Neuromusculoskeletal  3. Movement Related Activities and Participation Affected:  1. General Tasks and Demands  2. Mobility  3. Domestic Life  4.  Community, Social and [place]   Number of elements (examined above) that affect the Plan of Care: 3: MODERATE COMPLEXITY   CLINICAL PRESENTATION:   Presentation: Evolving clinical presentation with changing clinical characteristics: MODERATE COMPLEXITY   CLINICAL DECISION MAKING:   Outcome Measure: Tool Used: Lower Extremity Functional Scale (LEFS)  Score:  Initial: 36/80 Most Recent: X/80 (Date: -- )   Interpretation of Score: 20 questions each scored on a 5 point scale with 0 representing \"extreme difficulty or unable to perform\" and 4 representing \"no difficulty\". The lower the score, the greater the functional disability. 80/80 represents no disability. Minimal detectable change is 9 points. Medical Necessity:   · Patient is expected to demonstrate progress in strength, range of motion, balance, coordination and functional technique to increase independence with ADLs, ambulation and work activities. Reason for Services/Other Comments:  · Patient continues to require skilled intervention due to challenged with ADLs and ambulation secondary to left groin pain. Use of outcome tool(s) and clinical judgement create a POC that gives a: Questionable prediction of patient's progress: MODERATE COMPLEXITY            TREATMENT:   (In addition to Assessment/Re-Assessment sessions the following treatments were rendered)  Pre-treatment Symptoms/Complaints:  Patient notes less left groin pain and decreased amount of low back pain. Continues to have twinges at times, but overall notes improvements. Pain: Initial:   Pain Intensity 1: 3  Pain Location 1: Groin, Pelvis  Pain Orientation 1: Left, Right  Post Session:  1/10     Therapeutic Exercise: (20 Minutes):  Exercises per grid below to improve mobility, strength, balance and coordination. Required minimal verbal and manual cues to promote proper body alignment, promote proper body posture and promote proper body mechanics. Progressed resistance, range, repetitions and complexity of movement as indicated.    Date:  8/20/2018 Activity/Exercise Parameters   Review HEP X 5 minutes   Supine hip release X 30 seconds, 3 positions. Modified keanu hip flexor stretch X 5 reps, 20 sec holds blilaterally   1/2 prone hip extension X 15 reps 5 sec holds   Review gait and walking program    Quadriped arch/sag X 10 reps, 5 sec holds   Quadriped hip extension X 10 reps, 5 sec holds   Standing hip abduction X 10 reps, lateral steps green band   Standing anterior elevation/posteriro depression of pelvis X 10 reps BLE   Standing posture X 10 minute review, neutral posture/core activation. Supine hamstrings stretch X 10 reps, 15 sec holds     Manual Therapy (    Soft Tissue Mobilization Duration  Duration: 25 Minutes): Manual techniques to facilitate improved motion and decreased pain. (Used abbreviations: MET - muscle energy technique; PNF - proprioceptive neuromuscular facilitation; NMR - neuromuscular re-education; a/p - anterior to posterior; p/a - posterior to anterior; FMP: functional movement pattern)   · Supine left anterior pelvis and hip soft tissue mobilization through iliacus and psoas, inguinal ligament  · Supine left LE long axis distraction  · Prone hip on axis mobilization bilaterally with manual resistance into hip ER/IR, followed with NMR  · Prone left sacral base p/a mobilization with FMP of hip ER/IR, followed with NMR   · Prone bilateral piriformis soft tissue mobilization with FMP of hip ER/IR, followed with NMR  · Side lying bilaterally for pelvic patterns PNF through anterior elevation/posterior depression. [facility] Portal  Treatment/Session Assessment:    · Response to Treatment:  Decreased soft tissue restrictions and improved pelvic mobility, improving LE flexibility. He will work independently one month and follow up as needed. · Compliance with Program/Exercises: compliant all of the time. · Recommendations/Intent for next treatment session:  \"Next visit will focus on advancements to more challenging activities\".   Total Treatment Duration: 60 minutes   PT Patient Time In/Time Out  Time In: 0730  Time Out: 1420 [facility] Mason Sierra PT

## 2018-09-03 ENCOUNTER — APPOINTMENT (OUTPATIENT)
Dept: PHYSICAL THERAPY | Age: 58
End: 2018-09-03
Attending: FAMILY MEDICINE

## 2018-09-10 ENCOUNTER — APPOINTMENT (OUTPATIENT)
Dept: PHYSICAL THERAPY | Age: 58
End: 2018-09-10
Attending: FAMILY MEDICINE

## 2018-11-20 NOTE — THERAPY DISCHARGE
Mark Anthony Rome Sr.  : 1960  Primary: [facility]  Secondary:  Therapy Center at [address]  [phone]   [phone]        OUTPATIENT PHYSICAL THERAPY:Discontinuation Summary 2018     ICD-10: Treatment Diagnosis: stiffness in joint, left hip M25.652  ICD-10: Treatment Diagnosis: difficulty walking R26.2  ICD-10: Treatment Diagnosis: strain of muscle, fascia and tendon of hip, sequela S76.012S    Precautions/Allergies:   Bactrim [sulfamethoxazole-trimethoprim]   Fall Risk Score: 2 (? 5 = High Risk)  MD Orders: evaluate and treat for left groin pain MEDICAL/REFERRING DIAGNOSIS:  Muscle strain [T14. 8XXA]   DATE OF ONSET: 2018  REFERRING PHYSICIAN: Saturnino Benitez MD  RETURN PHYSICIAN APPOINTMENT: as needed     DISCONTINUATION NOTE: 18: Mark Anthony Rome Sr. has been seen in physical therapy from 18 to 18 for 7 visits. Treatment has been discontinued at this time due to patient failing to return for additional treatment. The below goals were met prior to discontinuation. Some goals were not met due to continue to address independently. Thank you for this referral.       INITIAL ASSESSMENT:  Mr. Franny Gallo is a 62 y.o. male who presents to physical therapy with insidious onset of left groin pain since 2018. 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 He presents with soft tissue restrictions and muscle weakness and inhibition of his core stabilizers. He demonstrates an antalgic gait secondary to pain and restricted movements through his pelvis. He would benefit from skilled physical therapy to improve overall mobility and function with his ADLs. TREATMENT PLAN:  Effective Dates: 2018 TO 2018 (90 days).   Frequency/Duration:1 visit every other week for 90 Days (secondary to patient's work schedule)  GOALS: (Goals have been discussed and agreed upon with patient.)  Discharge Goals: Time Frame: 90 days  1. Patient demonstrates independence with his HEP without verbal cueing from therapist. GOAL MET  2. Patient able to ambulate for 1 hour without onset of left groin pain. GOAL MET  3. Patient able to get dressed and put shoes on without discomfort in left groin. GOAL MET  4. Improve LEFS outcome measure score from 36/80 to 70/80 to improve ADLs.  - not assessed  Rehabilitation Potential For Stated Goals: Excellent    Thank you for this referral,  Sharifa Soto, PT

## 2020-05-19 ENCOUNTER — HOSPITAL ENCOUNTER (OUTPATIENT)
Dept: MRI IMAGING | Age: 60
Discharge: HOME | End: 2020-05-19
Attending: FAMILY MEDICINE
Payer: COMMERCIAL

## 2020-05-19 DIAGNOSIS — M54.2 NECK PAIN: ICD-10-CM

## 2020-05-19 PROCEDURE — 72141 MRI NECK SPINE W/O DYE: CPT

## 2020-11-17 PROBLEM — E78.2 MIXED HYPERLIPIDEMIA: Status: ACTIVE | Noted: 2020-11-17

## 2021-01-15 ENCOUNTER — HOSPITAL ENCOUNTER (EMERGENCY)
Age: 61
Discharge: HOME | End: 2021-01-15
Attending: EMERGENCY MEDICINE
Payer: COMMERCIAL

## 2021-01-15 ENCOUNTER — APPOINTMENT (OUTPATIENT)
Dept: CT IMAGING | Age: 61
End: 2021-01-15
Attending: EMERGENCY MEDICINE
Payer: COMMERCIAL

## 2021-01-15 VITALS
BODY MASS INDEX: 29.26 KG/M2 | TEMPERATURE: 98.4 F | HEART RATE: 72 BPM | RESPIRATION RATE: 16 BRPM | OXYGEN SATURATION: 96 % | DIASTOLIC BLOOD PRESSURE: 73 MMHG | WEIGHT: 216 LBS | SYSTOLIC BLOOD PRESSURE: 119 MMHG | HEIGHT: 72 IN

## 2021-01-15 DIAGNOSIS — R42 DIZZINESS: Primary | ICD-10-CM

## 2021-01-15 LAB
ALBUMIN SERPL-MCNC: 4.1 G/DL (ref 3.2–4.6)
ALBUMIN/GLOB SERPL: 1.3 {RATIO} (ref 1.2–3.5)
ALP SERPL-CCNC: 83 U/L (ref 50–136)
ALT SERPL-CCNC: 27 U/L (ref 12–65)
ANION GAP SERPL CALC-SCNC: 7 MMOL/L (ref 7–16)
AST SERPL-CCNC: 11 U/L (ref 15–37)
ATRIAL RATE: 75 BPM
BASOPHILS # BLD: 0.1 K/UL (ref 0–0.2)
BASOPHILS NFR BLD: 1 % (ref 0–2)
BILIRUB SERPL-MCNC: 0.5 MG/DL (ref 0.2–1.1)
BUN SERPL-MCNC: 26 MG/DL (ref 8–23)
CALCIUM SERPL-MCNC: 9.9 MG/DL (ref 8.3–10.4)
CALCULATED P AXIS, ECG09: 64 DEGREES
CALCULATED R AXIS, ECG10: 7 DEGREES
CALCULATED T AXIS, ECG11: 44 DEGREES
CHLORIDE SERPL-SCNC: 104 MMOL/L (ref 98–107)
CO2 SERPL-SCNC: 26 MMOL/L (ref 21–32)
CREAT SERPL-MCNC: 1.32 MG/DL (ref 0.8–1.5)
DIAGNOSIS, 93000: NORMAL
DIFFERENTIAL METHOD BLD: ABNORMAL
EOSINOPHIL # BLD: 0.4 K/UL (ref 0–0.8)
EOSINOPHIL NFR BLD: 5 % (ref 0.5–7.8)
ERYTHROCYTE [DISTWIDTH] IN BLOOD BY AUTOMATED COUNT: 11.9 % (ref 11.9–14.6)
GLOBULIN SER CALC-MCNC: 3.2 G/DL (ref 2.3–3.5)
GLUCOSE SERPL-MCNC: 119 MG/DL (ref 65–100)
HCT VFR BLD AUTO: 38.5 % (ref 41.1–50.3)
HGB BLD-MCNC: 13.9 G/DL (ref 13.6–17.2)
IMM GRANULOCYTES # BLD AUTO: 0 K/UL (ref 0–0.5)
IMM GRANULOCYTES NFR BLD AUTO: 0 % (ref 0–5)
LYMPHOCYTES # BLD: 2.8 K/UL (ref 0.5–4.6)
LYMPHOCYTES NFR BLD: 36 % (ref 13–44)
MAGNESIUM SERPL-MCNC: 2.1 MG/DL (ref 1.8–2.4)
MCH RBC QN AUTO: 32.6 PG (ref 26.1–32.9)
MCHC RBC AUTO-ENTMCNC: 36.1 G/DL (ref 31.4–35)
MCV RBC AUTO: 90.2 FL (ref 79.6–97.8)
MONOCYTES # BLD: 0.8 K/UL (ref 0.1–1.3)
MONOCYTES NFR BLD: 11 % (ref 4–12)
NEUTS SEG # BLD: 3.7 K/UL (ref 1.7–8.2)
NEUTS SEG NFR BLD: 48 % (ref 43–78)
NRBC # BLD: 0 K/UL (ref 0–0.2)
P-R INTERVAL, ECG05: 152 MS
PLATELET # BLD AUTO: 230 K/UL (ref 150–450)
PMV BLD AUTO: 9.6 FL (ref 9.4–12.3)
POTASSIUM SERPL-SCNC: 3.6 MMOL/L (ref 3.5–5.1)
PROT SERPL-MCNC: 7.3 G/DL (ref 6.3–8.2)
Q-T INTERVAL, ECG07: 366 MS
QRS DURATION, ECG06: 90 MS
QTC CALCULATION (BEZET), ECG08: 408 MS
RBC # BLD AUTO: 4.27 M/UL (ref 4.23–5.6)
SODIUM SERPL-SCNC: 137 MMOL/L (ref 136–145)
VENTRICULAR RATE, ECG03: 75 BPM
WBC # BLD AUTO: 7.8 K/UL (ref 4.3–11.1)

## 2021-01-15 PROCEDURE — 93005 ELECTROCARDIOGRAM TRACING: CPT | Performed by: EMERGENCY MEDICINE

## 2021-01-15 PROCEDURE — 85025 COMPLETE CBC W/AUTO DIFF WBC: CPT

## 2021-01-15 PROCEDURE — 80053 COMPREHEN METABOLIC PANEL: CPT

## 2021-01-15 PROCEDURE — 99284 EMERGENCY DEPT VISIT MOD MDM: CPT

## 2021-01-15 PROCEDURE — 83735 ASSAY OF MAGNESIUM: CPT

## 2021-01-15 PROCEDURE — 70450 CT HEAD/BRAIN W/O DYE: CPT

## 2021-01-15 NOTE — Clinical Note
[address] [address] EMERGENCY DEPT 
[address] [place] [facility] [address] 
[phone] Work/School Note Date: 1/15/2021 To Whom It May concern: 
 
 
Salazar Carrizales Sr. was seen and treated today in the emergency room by the following provider(s): 
Attending Provider: Sandrita Crane MD. Melany Pantoja is excused from work/school on 01/15/21. He is clear to return to work/school on 01/16/21. Sincerely, Phillip Hinds MD

## 2021-01-15 NOTE — ED NOTES
I have reviewed discharge instructions with the patient. The patient verbalized understanding. Patient left ED via Discharge Method: ambulatory to Home with himself. Opportunity for questions and clarification provided. Patient given 0 scripts. To continue your aftercare when you leave the hospital, you may receive an automated call from our care team to check in on how you are doing. This is a free service and part of our promise to provide the best care and service to meet your aftercare needs.  If you have questions, or wish to unsubscribe from this service please call [phone]. Thank you for Choosing our [facility] Emergency Department.

## 2021-01-15 NOTE — ED PROVIDER NOTES
79-year-old gentleman presents with concerns about progressive dizziness that is gotten worse over the last 3 months. He says that he has seen an urgent care and his primary care doctor about it. He has had meclizine a couple of times with limited relief. He says that with the symptoms he said no speech or language problems. No weakness or numbness. Came to the ER today because he was at work and started to feel very dizzy. When he tried to respond to something at work he tried to take several steps but got so dizzy ended up falling into some sort of mechanical machine. He was not injured by the fall. No other associated symptoms. Elements of this note were created using speech recognition software. As such, errors of speech recognition may be present.            Past Medical History:   Diagnosis Date    Chest pain 8/11/2016    Chronic cellulitis     right leg \"blood flows opposite in both legs\", currently on Amoxicillin    Diabetes ([facility] [address]     type 2-  oral meds- avg bs 90s- denies hypo    History of kidney stones     multiple episodes    Hypertension     controlled with benicar    Obesity     BMI 31    Unspecified adverse effect of anesthesia 1997    delayed awakening after sinus surgery 1997--        Past Surgical History:   Procedure Laterality Date    HX CERVICAL FUSION      c6-c7    HX CERVICAL FUSION  6-20-12    redo of C6C7 fusion @ [facility]- with hardware/ graft    HX COLONOSCOPY      HX HERNIA REPAIR      hernia x 2   inguinal    HX KNEE ARTHROSCOPY Right 2012    HX KNEE ARTHROSCOPY Left 2013    HX MOHS PROCEDURES Right     HX ORTHOPAEDIC Right     carpal tunnel rt;     HX UROLOGICAL  2002    kidney stone, cysto    SINUS SURGERY PROC UNLISTED  1997    sinus         Family History:   Problem Relation Age of Onset    Hypertension Mother     Cancer Father     Hypertension Father     Cancer Sister     Heart Disease Brother     Cancer Sister     Stroke Maternal Grandfather     Lung Disease Neg Hx        Social History     Socioeconomic History    Marital status:      Spouse name: Not on file    Number of children: Not on file    Years of education: Not on file    Highest education level: Not on file   Occupational History    Occupation: works at [employer]: [address] resource strain: Not on file    Food insecurity     Worry: Not on file     Inability: Not on file   [employer] needs     Medical: Not on file     Non-medical: Not on file   Tobacco Use    Smoking status: Never Smoker    Smokeless tobacco: Never Used   Substance and Sexual Activity    Alcohol use: Yes     Comment: socially 2 x per week    Drug use: No     Types: Prescription, OTC    Sexual activity: Yes     Partners: Female   Lifestyle    Physical activity     Days per week: Not on file     Minutes per session: Not on file    Stress: Not on file   Relationships    Social connections     Talks on phone: Not on file     Gets together: Not on file     Attends Amish service: Not on file     Active member of club or organization: Not on file     Attends meetings of clubs or organizations: Not on file     Relationship status: Not on file    Intimate partner violence     Fear of current or ex partner: Not on file     Emotionally abused: Not on file     Physically abused: Not on file     Forced sexual activity: Not on file   Other Topics Concern    Not on file   Social History Narrative    He has been a lifelong nonsmoker. Occasional EtOH use. Negative for asbestos or TB exposure. He has worked at [email or web address], previously cleaned machinery with mineral spirits. He has always lived in [place]. He is . ALLERGIES: Bactrim [sulfamethoxazole-trimethoprim]    Review of Systems   Constitutional: Negative for chills, diaphoresis and fever. HENT: Negative for congestion, rhinorrhea and sore throat. Eyes: Negative for redness and visual disturbance.    Respiratory: Negative for cough, chest tightness, shortness of breath and wheezing. Cardiovascular: Negative for chest pain and palpitations. Gastrointestinal: Negative for abdominal pain, blood in stool, diarrhea, nausea and vomiting. Endocrine: Negative for polydipsia and polyuria. Genitourinary: Negative for dysuria and hematuria. Musculoskeletal: Negative for arthralgias, myalgias and neck stiffness. Skin: Negative for rash. Allergic/Immunologic: Negative for environmental allergies and food allergies. Neurological: Positive for dizziness. Negative for weakness and headaches. Hematological: Negative for adenopathy. Does not bruise/bleed easily. Psychiatric/Behavioral: Negative for confusion and sleep disturbance. The patient is not nervous/anxious. Vitals:    01/15/21 1314 01/15/21 1348 01/15/21 1351   BP: 125/81     Pulse: 84     Resp: 16     Temp: 98.7 °F (37.1 °C)     SpO2: 96% 97% 98%   Weight: 98 kg (216 lb)     Height: 6' (1.829 m)              Physical Exam  Vitals signs and nursing note reviewed. Constitutional:       General: He is not in acute distress. Appearance: He is well-developed. He is not toxic-appearing. HENT:      Head: Normocephalic and atraumatic. Eyes:      General: No scleral icterus. Right eye: No discharge. Left eye: No discharge. Conjunctiva/sclera: Conjunctivae normal.      Pupils: Pupils are equal, round, and reactive to light. Neck:      Musculoskeletal: Normal range of motion. No neck rigidity. Cardiovascular:      Rate and Rhythm: Normal rate and regular rhythm. Heart sounds: Normal heart sounds. Pulmonary:      Effort: Pulmonary effort is normal. No respiratory distress. Breath sounds: Normal breath sounds. No wheezing or rales. Chest:      Chest wall: No tenderness. Abdominal:      General: Bowel sounds are normal. There is no distension. Palpations: Abdomen is soft. Tenderness:  There is no guarding or rebound. Musculoskeletal: Normal range of motion. General: No tenderness. Lymphadenopathy:      Cervical: No cervical adenopathy. Skin:     General: Skin is warm and dry. Neurological:      General: No focal deficit present. Mental Status: He is alert and oriented to person, place, and time. Comments: Negative Romberg. He was able to walk in er without difficulty. Psychiatric:         Mood and Affect: Mood normal.         Behavior: Behavior normal.          MDM  Number of Diagnoses or Management Options  Diagnosis management comments: I will get a head CT scan to look for intracranial abnormality. I will also check his basic blood work. ED Course as of Luis 15 1532   Theo Chambers Luis 15, 2021   1530 Can is negative. Blood work is unremarkable. I discussed with him the fact that I do not have an explanation for his symptoms. I will encourage him to continue to follow-up.     [AC]      ED Course User Index  [AC] Terence Greco MD       Procedures

## 2021-01-15 NOTE — ED TRIAGE NOTES
Patient advises that he has been having episodes of dizziness for a few months with nausea and not wanting to eat a lot. Patient advises that he had episode at work today and fell down, no loss of consciousness during episode. Patient states was placed on meclizine and changed him to trulicity. Patient advises that he has had a 30 lb weight loss due to no eating with nausea. Mask on during triage.

## 2021-01-15 NOTE — DISCHARGE INSTRUCTIONS
As we discussed, we did not find the exact cause of your symptoms today in the emergency department. Therefore, it is important for you to follow-up with your primary care doctor for continued evaluation. Consider following up with an ear nose and throat specialist for additional evaluation. Please return with any fevers, vomiting, difficulty breathing, worsening symptoms, or additional concerns.

## 2021-08-13 ENCOUNTER — HOSPITAL ENCOUNTER (EMERGENCY)
Age: 61
Discharge: HOME | End: 2021-08-13
Attending: EMERGENCY MEDICINE
Payer: COMMERCIAL

## 2021-08-13 ENCOUNTER — APPOINTMENT (OUTPATIENT)
Dept: GENERAL RADIOLOGY | Age: 61
End: 2021-08-13
Attending: EMERGENCY MEDICINE
Payer: COMMERCIAL

## 2021-08-13 VITALS
HEIGHT: 72 IN | HEART RATE: 77 BPM | OXYGEN SATURATION: 96 % | BODY MASS INDEX: 31.15 KG/M2 | DIASTOLIC BLOOD PRESSURE: 83 MMHG | RESPIRATION RATE: 18 BRPM | WEIGHT: 230 LBS | SYSTOLIC BLOOD PRESSURE: 145 MMHG

## 2021-08-13 DIAGNOSIS — R07.9 CHEST PAIN, UNSPECIFIED TYPE: Primary | ICD-10-CM

## 2021-08-13 LAB
ALBUMIN SERPL-MCNC: 3.7 G/DL (ref 3.2–4.6)
ALBUMIN/GLOB SERPL: 1.1 {RATIO} (ref 1.2–3.5)
ALP SERPL-CCNC: 82 U/L (ref 50–136)
ALT SERPL-CCNC: 32 U/L (ref 12–65)
ANION GAP SERPL CALC-SCNC: 10 MMOL/L (ref 7–16)
AST SERPL-CCNC: 23 U/L (ref 15–37)
BASOPHILS # BLD: 0.1 K/UL (ref 0–0.2)
BASOPHILS NFR BLD: 1 % (ref 0–2)
BILIRUB SERPL-MCNC: 0.4 MG/DL (ref 0.2–1.1)
BUN SERPL-MCNC: 19 MG/DL (ref 8–23)
CALCIUM SERPL-MCNC: 9 MG/DL (ref 8.3–10.4)
CHLORIDE SERPL-SCNC: 108 MMOL/L (ref 98–107)
CO2 SERPL-SCNC: 24 MMOL/L (ref 21–32)
CREAT SERPL-MCNC: 1.29 MG/DL (ref 0.8–1.5)
DIFFERENTIAL METHOD BLD: ABNORMAL
EOSINOPHIL # BLD: 0.4 K/UL (ref 0–0.8)
EOSINOPHIL NFR BLD: 4 % (ref 0.5–7.8)
ERYTHROCYTE [DISTWIDTH] IN BLOOD BY AUTOMATED COUNT: 12.7 % (ref 11.9–14.6)
GLOBULIN SER CALC-MCNC: 3.3 G/DL (ref 2.3–3.5)
GLUCOSE SERPL-MCNC: 130 MG/DL (ref 65–100)
HCT VFR BLD AUTO: 38.5 % (ref 41.1–50.3)
HGB BLD-MCNC: 13.8 G/DL (ref 13.6–17.2)
IMM GRANULOCYTES # BLD AUTO: 0 K/UL (ref 0–0.5)
IMM GRANULOCYTES NFR BLD AUTO: 0 % (ref 0–5)
LIPASE SERPL-CCNC: 110 U/L (ref 73–393)
LYMPHOCYTES # BLD: 2.1 K/UL (ref 0.5–4.6)
LYMPHOCYTES NFR BLD: 24 % (ref 13–44)
MAGNESIUM SERPL-MCNC: 2.2 MG/DL (ref 1.8–2.4)
MCH RBC QN AUTO: 32 PG (ref 26.1–32.9)
MCHC RBC AUTO-ENTMCNC: 35.8 G/DL (ref 31.4–35)
MCV RBC AUTO: 89.3 FL (ref 79.6–97.8)
MONOCYTES # BLD: 0.7 K/UL (ref 0.1–1.3)
MONOCYTES NFR BLD: 8 % (ref 4–12)
NEUTS SEG # BLD: 5.6 K/UL (ref 1.7–8.2)
NEUTS SEG NFR BLD: 64 % (ref 43–78)
NRBC # BLD: 0 K/UL (ref 0–0.2)
PLATELET # BLD AUTO: 205 K/UL (ref 150–450)
PMV BLD AUTO: 9.3 FL (ref 9.4–12.3)
POTASSIUM SERPL-SCNC: 4 MMOL/L (ref 3.5–5.1)
PROT SERPL-MCNC: 7 G/DL (ref 6.3–8.2)
RBC # BLD AUTO: 4.31 M/UL (ref 4.23–5.6)
SODIUM SERPL-SCNC: 142 MMOL/L (ref 136–145)
TROPONIN-HIGH SENSITIVITY: 8.3 PG/ML (ref 0–14)
TROPONIN-HIGH SENSITIVITY: 8.7 PG/ML (ref 0–14)
WBC # BLD AUTO: 8.8 K/UL (ref 4.3–11.1)

## 2021-08-13 PROCEDURE — 83690 ASSAY OF LIPASE: CPT

## 2021-08-13 PROCEDURE — 93005 ELECTROCARDIOGRAM TRACING: CPT | Performed by: EMERGENCY MEDICINE

## 2021-08-13 PROCEDURE — 83735 ASSAY OF MAGNESIUM: CPT

## 2021-08-13 PROCEDURE — 84484 ASSAY OF TROPONIN QUANT: CPT

## 2021-08-13 PROCEDURE — 80053 COMPREHEN METABOLIC PANEL: CPT

## 2021-08-13 PROCEDURE — 71046 X-RAY EXAM CHEST 2 VIEWS: CPT

## 2021-08-13 PROCEDURE — 99284 EMERGENCY DEPT VISIT MOD MDM: CPT

## 2021-08-13 PROCEDURE — 85025 COMPLETE CBC W/AUTO DIFF WBC: CPT

## 2021-08-13 RX ORDER — SODIUM CHLORIDE 0.9 % (FLUSH) 0.9 %
5-10 SYRINGE (ML) INJECTION EVERY 8 HOURS
Status: DISCONTINUED | OUTPATIENT
Start: 2021-08-13 | End: 2021-08-13 | Stop reason: HOSPADM

## 2021-08-13 RX ORDER — SODIUM CHLORIDE 0.9 % (FLUSH) 0.9 %
5-10 SYRINGE (ML) INJECTION AS NEEDED
Status: DISCONTINUED | OUTPATIENT
Start: 2021-08-13 | End: 2021-08-13 | Stop reason: HOSPADM

## 2021-08-13 NOTE — ED PROVIDER NOTES
22-year-old male with a history of diabetes, hypertension, kidney stones presents with sudden onset left-sided sharp chest pain that radiated to his neck and left arm while at work around 10:30 AM.  Pain lasted about 30 minutes and resolved. He reported numbness in his left arm that has also now resolved. Denies any recent heavy lifting or injury. He states his chest felt \"sore\" for quite some time afterwards. Denies any history of coronary disease. No recent cough, congestion, fever, nausea, vomiting, abdominal pain, exertional symptoms. Given aspirin by EMS. Last stress test:  \"CONCLUSION:   1. Stress EKG: Non-diagnostic due to equivocal EKG changes. 2. SPECT Perfusion Imaging: Normal Perfusion. 3. LV Systolic Function is  normal.     Comments: Return to cardiologist for follow up. Chace Tim MD   9/12/2016   2:18 PM \"      Chest Pain (Angina)   Associated symptoms include back pain. Pertinent negatives include no abdominal pain, no cough, no fever, no headaches, no nausea, no shortness of breath and no vomiting.         Past Medical History:   Diagnosis Date    Chest pain 8/11/2016    Chronic cellulitis     right leg \"blood flows opposite in both legs\", currently on Amoxicillin    Diabetes ([address]     type 2-  oral meds- avg bs 90s- denies hypo    History of kidney stones     multiple episodes    Hypertension     controlled with benicar    Obesity     BMI 31    Unspecified adverse effect of anesthesia 1997    delayed awakening after sinus surgery 1997--        Past Surgical History:   Procedure Laterality Date    HX CERVICAL FUSION      c6-c7    HX CERVICAL FUSION  6-20-12    redo of C6C7 fusion @ [facility]- with hardware/ graft    HX COLONOSCOPY      HX HERNIA REPAIR      hernia x 2   inguinal    HX KNEE ARTHROSCOPY Right 2012    HX KNEE ARTHROSCOPY Left 2013    HX MOHS PROCEDURES Right     HX ORTHOPAEDIC Right     carpal tunnel rt;     HX UROLOGICAL  2002    kidney stone, cysto  [place] SINUS SURGERY PROC UNLISTED  1997    sinus         Family History:   Problem Relation Age of Onset    Hypertension Mother     Cancer Father     Hypertension Father     Cancer Sister     Heart Disease Brother     Cancer Sister     Stroke Maternal Grandfather     Lung Disease Neg Hx        Social History     Socioeconomic History    Marital status:      Spouse name: Not on file    Number of children: Not on file    Years of education: Not on file    Highest education level: Not on file   Occupational History    Occupation: works at [employer]: [employer]   Tobacco Use    Smoking status: Never Smoker    Smokeless tobacco: Never Used   Substance and Sexual Activity    Alcohol use: Yes     Comment: socially 2 x per week    Drug use: No     Types: Prescription, OTC    Sexual activity: Yes     Partners: Female   Other Topics Concern    Not on file   Social History Narrative    He has been a lifelong nonsmoker. Occasional EtOH use. Negative for asbestos or TB exposure. He has worked at [employer], previously cleaned machinery with mineral spirits. He has always lived in [place]. He is . Social Determinants of Health     Financial Resource Strain:     Difficulty of Paying Living Expenses:    Food Insecurity:     Worried About Running Out of Food in the Last Year:     [address] in the Last Year:    Transportation Needs:     Lack of Transportation (Medical):      Lack of Transportation (Non-Medical):    Physical Activity:     Days of Exercise per Week:     Minutes of Exercise per Session:    Stress:     Feeling of Stress :    Social Connections:     Frequency of Communication with Friends and Family:     Frequency of Social Gatherings with Friends and Family:     Attends Buddhist Services:     Active Member of Clubs or Organizations:     Attends Club or Organization Meetings:     Marital Status:    Intimate Partner Violence:     Fear of Current or Ex-Partner:  Emotionally Abused:     Physically Abused:     Sexually Abused: ALLERGIES: Bactrim [sulfamethoxazole-trimethoprim]    Review of Systems   Constitutional: Negative for fever. HENT: Negative for hearing loss. Eyes: Negative for visual disturbance. Respiratory: Negative for cough and shortness of breath. Cardiovascular: Positive for chest pain. Gastrointestinal: Negative for abdominal pain, diarrhea, nausea and vomiting. Musculoskeletal: Positive for arthralgias, back pain and neck pain. Skin: Negative for rash. Neurological: Negative for headaches. Psychiatric/Behavioral: Negative for confusion. All other systems reviewed and are negative. Vitals:    08/13/21 1415   BP: (!) 155/73   Pulse: 73   Resp: 18   SpO2: 96%   Weight: 104.3 kg (230 lb)   Height: 6' (1.829 m)            Physical Exam  Vitals and nursing note reviewed. Constitutional:       Appearance: Normal appearance. He is well-developed. HENT:      Head: Normocephalic and atraumatic. Nose: Nose normal.      Mouth/Throat:      Mouth: Mucous membranes are moist.   Eyes:      Pupils: Pupils are equal, round, and reactive to light. Cardiovascular:      Rate and Rhythm: Regular rhythm. Heart sounds: Normal heart sounds. Pulmonary:      Effort: Pulmonary effort is normal.      Breath sounds: Normal breath sounds. Abdominal:      Palpations: Abdomen is soft. Tenderness: There is no abdominal tenderness. Musculoskeletal:         General: No deformity. Normal range of motion. Cervical back: Normal range of motion and neck supple. Skin:     General: Skin is warm and dry. Neurological:      General: No focal deficit present. Mental Status: He is alert. Mental status is at baseline.    Psychiatric:         Mood and Affect: Mood normal.         Behavior: Behavior normal.          MDM  Number of Diagnoses or Management Options  Diagnosis management comments: Parts of this document were created using dragon voice recognition software. The chart has been reviewed but errors may still be present. I wore appropriate PPE throughout this patient's ED visit. Ralph Sharma MD, 2:43 PM    No new EKG changes or acute ischemia. No risk factors for PE.    5:33 PM  2 normal troponins. Advise follow-up with cardiology for further testing. I discussed the results of all labs, procedures, radiographs, and treatments with the patient and available family. Treatment plan is agreed upon and the patient is ready for discharge. Questions about treatment in the ED and differential diagnosis of presenting condition were answered. Patient was given verbal discharge instructions including, but not limited to, importance of returning to the emergency department for any concern of worsening or continued symptoms. Instructions were given to follow up with a primary care provider or specialist within 1-2 days. Adverse effects of medications, if prescribed, were discussed and patient was advised to refrain from significant physical activity until followed up by primary care physician and to not drive or operate heavy machinery after taking any sedating substances.            Amount and/or Complexity of Data Reviewed  Clinical lab tests: reviewed and ordered (Results for orders placed or performed during the hospital encounter of 08/13/21  -TROPONIN-HIGH SENSITIVITY       Result                      Value             Ref Range           Troponin-High Sensitiv*     8.7               0 - 14 pg/mL   -TROPONIN-HIGH SENSITIVITY       Result                      Value             Ref Range           Troponin-High Sensitiv*     8.3               0 - 14 pg/mL   -CBC WITH AUTOMATED DIFF       Result                      Value             Ref Range           WBC                         8.8               4.3 - 11.1 K*       RBC                         4.31              4.23 - 5.6 M*       HGB                         13.8 13.6 - 17.2 *       HCT                         38.5 (L)          41.1 - 50.3 %       MCV                         89.3              79.6 - 97.8 *       MCH                         32.0              26.1 - 32.9 *       MCHC                        35.8 (H)          31.4 - 35.0 *       RDW                         12.7              11.9 - 14.6 %       PLATELET                    205               150 - 450 K/*       MPV                         9.3 (L)           9.4 - 12.3 FL       ABSOLUTE NRBC               0.00              0.0 - 0.2 K/*       DF                          AUTOMATED                             NEUTROPHILS                 64                43 - 78 %           LYMPHOCYTES                 24                13 - 44 %           MONOCYTES                   8                 4.0 - 12.0 %        EOSINOPHILS                 4                 0.5 - 7.8 %         BASOPHILS                   1                 0.0 - 2.0 %         IMMATURE GRANULOCYTES       0                 0.0 - 5.0 %         ABS. NEUTROPHILS            5.6               1.7 - 8.2 K/*       ABS. LYMPHOCYTES            2.1               0.5 - 4.6 K/*       ABS. MONOCYTES              0.7               0.1 - 1.3 K/*       ABS. EOSINOPHILS            0.4               0.0 - 0.8 K/*       ABS. BASOPHILS              0.1               0.0 - 0.2 K/*       ABS. IMM.  GRANS.            0.0               0.0 - 0.5 K/*  -METABOLIC PANEL, COMPREHENSIVE       Result                      Value             Ref Range           Sodium                      142               136 - 145 mm*       Potassium                   4.0               3.5 - 5.1 mm*       Chloride                    108 (H)           98 - 107 mmo*       CO2                         24                21 - 32 mmol*       Anion gap                   10                7 - 16 mmol/L       Glucose                     130 (H)           65 - 100 mg/*       BUN                         19 8 - 23 MG/DL        Creatinine                  1.29              0.8 - 1.5 MG*       GFR est AA                  >60               >60 ml/min/1*       GFR est non-AA              >60               >60 ml/min/1*       Calcium                     9.0               8.3 - 10.4 M*       Bilirubin, total            0.4               0.2 - 1.1 MG*       ALT (SGPT)                  32                12 - 65 U/L         AST (SGOT)                  23                15 - 37 U/L         Alk. phosphatase            82                50 - 136 U/L        Protein, total              7.0               6.3 - 8.2 g/*       Albumin                     3.7               3.2 - 4.6 g/*       Globulin                    3.3               2.3 - 3.5 g/*       A-G Ratio                   1.1 (L)           1.2 - 3.5      -LIPASE       Result                      Value             Ref Range           Lipase                      110               73 - 393 U/L   -MAGNESIUM       Result                      Value             Ref Range           Magnesium                   2.2               1.8 - 2.4 mg*  )  Tests in the radiology section of CPT®: ordered and reviewed (XR CHEST PA LAT    Result Date: 8/13/2021  History: Chest Pain, left-sided, onset this morning Two views chest Findings: The lungs are well expanded and clear.  The cardiac silhouette, and mediastinal contour, and osseous structures are normal.     Unremarkable two-view chest.     )           EKG    Date/Time: 8/13/2021 2:43 PM  Performed by: Jemima Nieto MD  Authorized by: Jemima Nieto MD     ECG reviewed by ED Physician in the absence of a cardiologist: yes    Interpretation:     Interpretation: abnormal    Rate:     ECG rate:  79    ECG rate assessment: normal    Rhythm:     Rhythm: sinus rhythm    Ectopy:     Ectopy: none    Conduction:     Conduction: normal    ST segments:     ST segments:  Non-specific

## 2021-08-13 NOTE — ED TRIAGE NOTES
Pt BIB EMS from work for left sided chest pain. States that he is tender over the site. 324mg ASA given by EMS. No injury.

## 2021-08-13 NOTE — ED NOTES
I have reviewed discharge instructions with the patient. The patient verbalized understanding. Patient left ED via Discharge Method: ambulatory to Home with wife. Opportunity for questions and clarification provided. Patient given 0 scripts. To continue your aftercare when you leave the hospital, you may receive an automated call from our care team to check in on how you are doing. This is a free service and part of our promise to provide the best care and service to meet your aftercare needs.  If you have questions, or wish to unsubscribe from this service please call [phone]. Thank you for Choosing our [facility] Emergency Department.

## 2021-08-14 LAB
ATRIAL RATE: 79 BPM
CALCULATED P AXIS, ECG09: 51 DEGREES
CALCULATED R AXIS, ECG10: -17 DEGREES
CALCULATED T AXIS, ECG11: 30 DEGREES
DIAGNOSIS, 93000: NORMAL
P-R INTERVAL, ECG05: 152 MS
Q-T INTERVAL, ECG07: 360 MS
QRS DURATION, ECG06: 88 MS
QTC CALCULATION (BEZET), ECG08: 412 MS
VENTRICULAR RATE, ECG03: 79 BPM

## 2022-03-18 PROBLEM — E78.2 MIXED HYPERLIPIDEMIA: Status: ACTIVE | Noted: 2020-11-17

## 2022-03-18 PROBLEM — R05.9 COUGH: Status: ACTIVE | Noted: 2018-03-07

## 2022-11-18 ENCOUNTER — PATIENT MESSAGE (OUTPATIENT)
Dept: FAMILY MEDICINE CLINIC | Facility: CLINIC | Age: 62
End: 2022-11-18

## 2022-11-25 RX ORDER — SILDENAFIL 100 MG/1
100 TABLET, FILM COATED ORAL PRN
Qty: 30 TABLET | Refills: 3 | Status: SHIPPED | OUTPATIENT
Start: 2022-11-25

## 2022-12-01 RX ORDER — TADALAFIL 5 MG/1
TABLET ORAL
Qty: 90 TABLET | OUTPATIENT
Start: 2022-12-01

## 2023-01-27 ENCOUNTER — NURSE ONLY (OUTPATIENT)
Dept: FAMILY MEDICINE CLINIC | Facility: CLINIC | Age: 63
End: 2023-01-27

## 2023-01-27 DIAGNOSIS — Z00.00 ENCOUNTER FOR ANNUAL PHYSICAL EXAM: Primary | ICD-10-CM

## 2023-01-27 DIAGNOSIS — I10 ESSENTIAL (PRIMARY) HYPERTENSION: ICD-10-CM

## 2023-01-27 DIAGNOSIS — Z12.5 SCREENING PSA (PROSTATE SPECIFIC ANTIGEN): ICD-10-CM

## 2023-01-27 DIAGNOSIS — Z79.4 TYPE 2 DIABETES MELLITUS WITH DIABETIC POLYNEUROPATHY, WITH LONG-TERM CURRENT USE OF INSULIN (HCC): ICD-10-CM

## 2023-01-27 DIAGNOSIS — E11.42 TYPE 2 DIABETES MELLITUS WITH DIABETIC POLYNEUROPATHY, WITH LONG-TERM CURRENT USE OF INSULIN (HCC): ICD-10-CM

## 2023-01-27 DIAGNOSIS — E78.2 MIXED HYPERLIPIDEMIA: ICD-10-CM

## 2023-01-27 DIAGNOSIS — Z00.00 ENCOUNTER FOR ANNUAL PHYSICAL EXAM: ICD-10-CM

## 2023-01-27 LAB
ALBUMIN SERPL-MCNC: 3.8 G/DL (ref 3.2–4.6)
ALBUMIN/GLOB SERPL: 1.1 (ref 0.4–1.6)
ALP SERPL-CCNC: 86 U/L (ref 50–136)
ALT SERPL-CCNC: 32 U/L (ref 12–65)
ANION GAP SERPL CALC-SCNC: 9 MMOL/L (ref 2–11)
APPEARANCE UR: CLEAR
AST SERPL-CCNC: 15 U/L (ref 15–37)
BACTERIA URNS QL MICRO: NEGATIVE /HPF
BASOPHILS # BLD: 0.1 K/UL (ref 0–0.2)
BASOPHILS NFR BLD: 1 % (ref 0–2)
BILIRUB SERPL-MCNC: 0.5 MG/DL (ref 0.2–1.1)
BILIRUB UR QL: NEGATIVE
BUN SERPL-MCNC: 24 MG/DL (ref 8–23)
CALCIUM SERPL-MCNC: 10.1 MG/DL (ref 8.3–10.4)
CASTS URNS QL MICRO: NORMAL /LPF (ref 0–2)
CHLORIDE SERPL-SCNC: 103 MMOL/L (ref 101–110)
CHOLEST SERPL-MCNC: 133 MG/DL
CO2 SERPL-SCNC: 27 MMOL/L (ref 21–32)
COLOR UR: NORMAL
CREAT SERPL-MCNC: 1.3 MG/DL (ref 0.8–1.5)
DIFFERENTIAL METHOD BLD: ABNORMAL
EOSINOPHIL # BLD: 0.7 K/UL (ref 0–0.8)
EOSINOPHIL NFR BLD: 10 % (ref 0.5–7.8)
EPI CELLS #/AREA URNS HPF: NORMAL /HPF (ref 0–5)
ERYTHROCYTE [DISTWIDTH] IN BLOOD BY AUTOMATED COUNT: 13.2 % (ref 11.9–14.6)
GLOBULIN SER CALC-MCNC: 3.4 G/DL (ref 2.8–4.5)
GLUCOSE SERPL-MCNC: 149 MG/DL (ref 65–100)
GLUCOSE UR STRIP.AUTO-MCNC: NEGATIVE MG/DL
HCT VFR BLD AUTO: 41.6 % (ref 41.1–50.3)
HDLC SERPL-MCNC: 25 MG/DL (ref 40–60)
HDLC SERPL: 5.3
HGB BLD-MCNC: 14.4 G/DL (ref 13.6–17.2)
HGB UR QL STRIP: NEGATIVE
IMM GRANULOCYTES # BLD AUTO: 0 K/UL (ref 0–0.5)
IMM GRANULOCYTES NFR BLD AUTO: 0 % (ref 0–5)
KETONES UR QL STRIP.AUTO: NEGATIVE MG/DL
LDLC SERPL CALC-MCNC: ABNORMAL MG/DL
LDLC SERPL DIRECT ASSAY-MCNC: 67 MG/DL
LEUKOCYTE ESTERASE UR QL STRIP.AUTO: NEGATIVE
LYMPHOCYTES # BLD: 2.1 K/UL (ref 0.5–4.6)
LYMPHOCYTES NFR BLD: 31 % (ref 13–44)
MCH RBC QN AUTO: 34 PG (ref 26.1–32.9)
MCHC RBC AUTO-ENTMCNC: 34.6 G/DL (ref 31.4–35)
MCV RBC AUTO: 98.3 FL (ref 82–102)
MONOCYTES # BLD: 0.7 K/UL (ref 0.1–1.3)
MONOCYTES NFR BLD: 11 % (ref 4–12)
MUCOUS THREADS URNS QL MICRO: 0 /LPF
NEUTS SEG # BLD: 3.2 K/UL (ref 1.7–8.2)
NEUTS SEG NFR BLD: 47 % (ref 43–78)
NITRITE UR QL STRIP.AUTO: NEGATIVE
NRBC # BLD: 0 K/UL (ref 0–0.2)
PH UR STRIP: 5 (ref 5–9)
PLATELET # BLD AUTO: 248 K/UL (ref 150–450)
PMV BLD AUTO: 10.4 FL (ref 9.4–12.3)
POTASSIUM SERPL-SCNC: 4.4 MMOL/L (ref 3.5–5.1)
PROT SERPL-MCNC: 7.2 G/DL (ref 6.3–8.2)
PROT UR STRIP-MCNC: NEGATIVE MG/DL
PSA SERPL-MCNC: 2.8 NG/ML
RBC # BLD AUTO: 4.23 M/UL (ref 4.23–5.6)
RBC #/AREA URNS HPF: NORMAL /HPF (ref 0–5)
SODIUM SERPL-SCNC: 139 MMOL/L (ref 133–143)
SP GR UR REFRACTOMETRY: 1.02 (ref 1–1.02)
TRIGL SERPL-MCNC: 410 MG/DL (ref 35–150)
TSH, 3RD GENERATION: 1.83 UIU/ML (ref 0.36–3.74)
URINE CULTURE IF INDICATED: NORMAL
UROBILINOGEN UR QL STRIP.AUTO: 0.2 EU/DL (ref 0.2–1)
VLDLC SERPL CALC-MCNC: 82 MG/DL (ref 6–23)
WBC # BLD AUTO: 6.7 K/UL (ref 4.3–11.1)
WBC URNS QL MICRO: NORMAL /HPF (ref 0–4)

## 2023-01-30 ENCOUNTER — OFFICE VISIT (OUTPATIENT)
Dept: FAMILY MEDICINE CLINIC | Facility: CLINIC | Age: 63
End: 2023-01-30
Payer: COMMERCIAL

## 2023-01-30 VITALS
HEIGHT: 72 IN | DIASTOLIC BLOOD PRESSURE: 88 MMHG | WEIGHT: 241 LBS | SYSTOLIC BLOOD PRESSURE: 166 MMHG | TEMPERATURE: 97.2 F | BODY MASS INDEX: 32.64 KG/M2 | HEART RATE: 121 BPM | OXYGEN SATURATION: 99 %

## 2023-01-30 DIAGNOSIS — E78.2 MIXED HYPERLIPIDEMIA: ICD-10-CM

## 2023-01-30 DIAGNOSIS — Z00.00 ENCOUNTER FOR ANNUAL PHYSICAL EXAM: Primary | ICD-10-CM

## 2023-01-30 DIAGNOSIS — E11.42 TYPE 2 DIABETES MELLITUS WITH DIABETIC POLYNEUROPATHY, WITHOUT LONG-TERM CURRENT USE OF INSULIN (HCC): ICD-10-CM

## 2023-01-30 DIAGNOSIS — G47.33 OBSTRUCTIVE SLEEP APNEA (ADULT) (PEDIATRIC): ICD-10-CM

## 2023-01-30 DIAGNOSIS — J45.21 MILD INTERMITTENT ASTHMA WITH ACUTE EXACERBATION: ICD-10-CM

## 2023-01-30 DIAGNOSIS — K21.9 GASTRO-ESOPHAGEAL REFLUX DISEASE WITHOUT ESOPHAGITIS: ICD-10-CM

## 2023-01-30 LAB
HBA1C MFR BLD: 6.3 %
HEMOCCULT STL QL: NEGATIVE
VALID INTERNAL CONTROL: YES

## 2023-01-30 PROCEDURE — 82272 OCCULT BLD FECES 1-3 TESTS: CPT | Performed by: FAMILY MEDICINE

## 2023-01-30 PROCEDURE — 83036 HEMOGLOBIN GLYCOSYLATED A1C: CPT | Performed by: FAMILY MEDICINE

## 2023-01-30 PROCEDURE — 3074F SYST BP LT 130 MM HG: CPT | Performed by: FAMILY MEDICINE

## 2023-01-30 PROCEDURE — 3078F DIAST BP <80 MM HG: CPT | Performed by: FAMILY MEDICINE

## 2023-01-30 PROCEDURE — 99396 PREV VISIT EST AGE 40-64: CPT | Performed by: FAMILY MEDICINE

## 2023-01-30 RX ORDER — OLMESARTAN MEDOXOMIL AND HYDROCHLOROTHIAZIDE 40/25 40; 25 MG/1; MG/1
1 TABLET ORAL DAILY
Qty: 90 TABLET | Refills: 3 | Status: SHIPPED | OUTPATIENT
Start: 2023-01-30 | End: 2023-02-02 | Stop reason: SDUPTHER

## 2023-01-30 RX ORDER — GLIMEPIRIDE 2 MG/1
2 TABLET ORAL DAILY
Qty: 90 TABLET | Refills: 3 | Status: SHIPPED | OUTPATIENT
Start: 2023-01-30 | End: 2023-02-02 | Stop reason: SDUPTHER

## 2023-01-30 SDOH — ECONOMIC STABILITY: FOOD INSECURITY: WITHIN THE PAST 12 MONTHS, YOU WORRIED THAT YOUR FOOD WOULD RUN OUT BEFORE YOU GOT MONEY TO BUY MORE.: NEVER TRUE

## 2023-01-30 SDOH — ECONOMIC STABILITY: FOOD INSECURITY: WITHIN THE PAST 12 MONTHS, THE FOOD YOU BOUGHT JUST DIDN'T LAST AND YOU DIDN'T HAVE MONEY TO GET MORE.: NEVER TRUE

## 2023-01-30 ASSESSMENT — PATIENT HEALTH QUESTIONNAIRE - PHQ9
SUM OF ALL RESPONSES TO PHQ QUESTIONS 1-9: 0
1. LITTLE INTEREST OR PLEASURE IN DOING THINGS: 0
SUM OF ALL RESPONSES TO PHQ QUESTIONS 1-9: 0
SUM OF ALL RESPONSES TO PHQ QUESTIONS 1-9: 0
2. FEELING DOWN, DEPRESSED OR HOPELESS: 0
SUM OF ALL RESPONSES TO PHQ9 QUESTIONS 1 & 2: 0
SUM OF ALL RESPONSES TO PHQ QUESTIONS 1-9: 0

## 2023-01-30 ASSESSMENT — SOCIAL DETERMINANTS OF HEALTH (SDOH): HOW HARD IS IT FOR YOU TO PAY FOR THE VERY BASICS LIKE FOOD, HOUSING, MEDICAL CARE, AND HEATING?: SOMEWHAT HARD

## 2023-02-02 ENCOUNTER — OFFICE VISIT (OUTPATIENT)
Dept: ENDOCRINOLOGY | Age: 63
End: 2023-02-02
Payer: COMMERCIAL

## 2023-02-02 VITALS
SYSTOLIC BLOOD PRESSURE: 130 MMHG | WEIGHT: 240 LBS | OXYGEN SATURATION: 97 % | BODY MASS INDEX: 32.55 KG/M2 | HEART RATE: 104 BPM | DIASTOLIC BLOOD PRESSURE: 88 MMHG

## 2023-02-02 DIAGNOSIS — I10 ESSENTIAL HYPERTENSION: ICD-10-CM

## 2023-02-02 DIAGNOSIS — E78.2 MIXED HYPERLIPIDEMIA: ICD-10-CM

## 2023-02-02 DIAGNOSIS — E11.65 CONTROLLED TYPE 2 DIABETES MELLITUS WITH HYPERGLYCEMIA, WITHOUT LONG-TERM CURRENT USE OF INSULIN (HCC): Primary | ICD-10-CM

## 2023-02-02 PROCEDURE — 3079F DIAST BP 80-89 MM HG: CPT | Performed by: INTERNAL MEDICINE

## 2023-02-02 PROCEDURE — 3075F SYST BP GE 130 - 139MM HG: CPT | Performed by: INTERNAL MEDICINE

## 2023-02-02 PROCEDURE — 99214 OFFICE O/P EST MOD 30 MIN: CPT | Performed by: INTERNAL MEDICINE

## 2023-02-02 RX ORDER — EMPAGLIFLOZIN 10 MG/1
10 TABLET, FILM COATED ORAL DAILY
Qty: 90 TABLET | Refills: 3 | Status: SHIPPED | OUTPATIENT
Start: 2023-02-02

## 2023-02-02 RX ORDER — GLIMEPIRIDE 2 MG/1
2 TABLET ORAL DAILY
Qty: 90 TABLET | Refills: 3 | Status: SHIPPED | OUTPATIENT
Start: 2023-02-02

## 2023-02-02 RX ORDER — ATORVASTATIN CALCIUM 10 MG/1
10 TABLET, FILM COATED ORAL NIGHTLY
Qty: 90 TABLET | Refills: 3
Start: 2023-02-02

## 2023-02-02 RX ORDER — OLMESARTAN MEDOXOMIL AND HYDROCHLOROTHIAZIDE 40/25 40; 25 MG/1; MG/1
1 TABLET ORAL DAILY
Qty: 90 TABLET | Refills: 3
Start: 2023-02-02

## 2023-02-02 ASSESSMENT — ENCOUNTER SYMPTOMS
VOMITING: 0
SHORTNESS OF BREATH: 0
NAUSEA: 0
DIARRHEA: 0
CONSTIPATION: 0

## 2023-02-02 NOTE — PROGRESS NOTES
José Samano MD, [facility]  [address], [address] [facility], [address] [place] [address]  Phone [phone]  Facsimile [phone]          Khris Arzate is a 58 y.o. male seen 2/2/2023 for follow-up of type 2 diabetes mellitus (has not been here since 7/2021)           ASSESSMENT AND PLAN:    1. Controlled type 2 diabetes mellitus with hyperglycemia, without long-term current use of insulin (HCC)  His overall glycemic control is pretty good and his hemoglobin A1c is at goal less than 7. He is having significant fasting hyperglycemia, however, and I will have him try Jardiance. Eye exam negative 7/2021 and I instructed him to make an appointment. Urine microalbumin negative 3/2021. Update labs prior to next visit. - metFORMIN (GLUCOPHAGE) 1000 MG tablet; Take 1 tablet by mouth 2 times daily (with meals)  Dispense: 180 tablet; Refill: 3  - glimepiride (AMARYL) 2 MG tablet; Take 1 tablet by mouth daily  Dispense: 90 tablet; Refill: 3  - JARDIANCE 10 MG tablet; Take 1 tablet by mouth daily  Dispense: 90 tablet; Refill: 3    2. Essential hypertension  Followed by his primary care physician.    - olmesartan-hydroCHLOROthiazide (BENICAR HCT) 40-25 MG per tablet; Take 1 tablet by mouth daily  Dispense: 90 tablet; Refill: 3    3. Mixed hyperlipidemia  LDL at goal less than 70 1/2023. His triglycerides were elevated but he was non-fasting. I will update a fasting lipid lipid panel prior to next visit. .    - atorvastatin (LIPITOR) 10 MG tablet; Take 1 tablet by mouth at bedtime  Dispense: 90 tablet; Refill: 3       Follow-up and Dispositions    Return in about 4 months (around 6/2/2023). HISTORY OF PRESENT ILLNESS:    DIABETES MELLITUS     Diagnosis: Type 2 diabetes mellitus diagnosed around 2015. Diet: He avoids sweets. No sweet tea or regular soft drinks. He tries to limit carbohydrates. Exercise: Stationary bike.       Monitoring: Rajeev Piano - 2 times a day. Blood Glucose: By recall: Fasting 194-255, supper 130-150. Hypoglycemia: +Hypoglycemic awareness. No recent episodes. Hemoglobin A1c:  8/27/2020: 6.6.  11/17/2020: 5.8.  3/16/2021: 6.3.  7/26/2021: 5.8.  1/30/2023: 6.3. Microalbumin/Nephropathy:  8/27/2020: Creatinine 0.99 (GFR 82). 3/16/2021: Creatinine 1.01 (GFR 80), urine microalbumin/creatinine 9.  1/27/2023: Creatinine 1.30 (GFR >60). Neuropathy: +Diabetic peripheral neuropathy with numbness of feet. Denies tingling or burning of feet. Retinopathy: Eye exam 7/2021 - no retinopathy. Lipids:  8/27/2020: Total cholesterol 155, triglycerides 285, HDL 27, LDL 71, VLDL 57.  3/16/2021: Total cholesterol 142, triglycerides 151, HDL 29, LDL 86, VLDL 27.  1/27/2023: Total cholesterol 133, triglycerides 410, HDL 25, direct LDL 67, VLDL 82. TSH:  8/27/2020: TSH 1.020.  3/16/2021: TSH 1.070.  1/27/2023: TSH 1.830. Prior Treatment: He stopped Trulicity due to nausea, vomiting and dizziness. The symptoms did not improve with stopping Trulicity. He was diagnosed with Meniere's disease. He took [place] but stopped it for unknown reasons. Review of Systems   Constitutional:  Negative for fatigue. Weight increased 10 pounds since last visit. Respiratory:  Negative for shortness of breath. Cardiovascular:  Negative for chest pain. Gastrointestinal:  Negative for constipation, diarrhea, nausea and vomiting. Vital Signs:  /88   Pulse (!) 104   Wt 240 lb (108.9 kg)   SpO2 97%   BMI 32.55 kg/m²     Wt Readings from Last 3 Encounters:   02/02/23 240 lb (108.9 kg)   01/30/23 241 lb (109.3 kg)   02/03/22 245 lb (111.1 kg)       Physical Exam  Constitutional:       General: He is not in acute distress. Neck:      Thyroid: No thyroid mass or thyromegaly. Cardiovascular:      Rate and Rhythm: Normal rate and regular rhythm. Comments: DP pulses 2+ bilaterally. No edema.   Lymphadenopathy: Cervical: No cervical adenopathy. Skin:     Comments: No lesions on feet. Neurological:      Motor: No tremor. Comments: Monofilament intact. Ankle reflexes absent. Orders Placed This Encounter   Procedures    Hemoglobin A1C     Standing Status:   Future     Standing Expiration Date:   2/2/2024    Comprehensive Metabolic Panel     Standing Status:   Future     Standing Expiration Date:   2/2/2024    Lipid Panel     Standing Status:   Future     Standing Expiration Date:   2/2/2024    Microalbumin / Creatinine Urine Ratio     Standing Status:   Future     Standing Expiration Date:   2/2/2024         Current Outpatient Medications   Medication Sig Dispense Refill    metFORMIN (GLUCOPHAGE) 1000 MG tablet Take 1 tablet by mouth 2 times daily (with meals) 180 tablet 3    glimepiride (AMARYL) 2 MG tablet Take 1 tablet by mouth daily 90 tablet 3    JARDIANCE 10 MG tablet Take 1 tablet by mouth daily 90 tablet 3    olmesartan-hydroCHLOROthiazide (BENICAR HCT) 40-25 MG per tablet Take 1 tablet by mouth daily 90 tablet 3    atorvastatin (LIPITOR) 10 MG tablet Take 1 tablet by mouth at bedtime 90 tablet 3    sildenafil (VIAGRA) 100 MG tablet Take 1 tablet by mouth as needed for Erectile Dysfunction 30 tablet 3    albuterol sulfate  (90 Base) MCG/ACT inhaler Inhale 2 puffs into the lungs every 4 hours as needed      albuterol (PROVENTIL) (2.5 MG/3ML) 0.083% nebulizer solution Inhale 2.5 mg into the lungs every 4 hours as needed      fluticasone (FLONASE) 50 MCG/ACT nasal spray 2 sprays by Nasal route daily      montelukast (SINGULAIR) 10 MG tablet TAKE 1 TABLET BY MOUTH EVERY DAY      valACYclovir (VALTREX) 1 g tablet TAKE 1 TABLET BY MOUTH THREE TIMES A DAY       No current facility-administered medications for this visit.

## 2023-02-07 ASSESSMENT — ENCOUNTER SYMPTOMS
EYES NEGATIVE: 1
RESPIRATORY NEGATIVE: 1
GASTROINTESTINAL NEGATIVE: 1

## 2023-02-07 NOTE — PROGRESS NOTES
HISTORY OF PRESENT ILLNESS  Isaac Lovell is a 58 y.o. y.o. male    Other  This is a new (physical) problem. The problem has been gradually improving. Diabetes  He presents for his follow-up diabetic visit. He has type 2 diabetes mellitus. His disease course has been improving. Hypertension  This is a recurrent problem. The problem is unchanged. Weight Management  This is a recurrent problem. The problem has been unchanged. Asthma  This is a recurrent problem. The problem has been gradually improving. His past medical history is significant for asthma. Allergies   Allergen Reactions    Sulfamethoxazole-Trimethoprim Rash     Burns skin        Current Outpatient Medications   Medication Sig    sildenafil (VIAGRA) 100 MG tablet Take 1 tablet by mouth as needed for Erectile Dysfunction    albuterol sulfate  (90 Base) MCG/ACT inhaler Inhale 2 puffs into the lungs every 4 hours as needed    albuterol (PROVENTIL) (2.5 MG/3ML) 0.083% nebulizer solution Inhale 2.5 mg into the lungs every 4 hours as needed    fluticasone (FLONASE) 50 MCG/ACT nasal spray 2 sprays by Nasal route daily    valACYclovir (VALTREX) 1 g tablet TAKE 1 TABLET BY MOUTH THREE TIMES A DAY    metFORMIN (GLUCOPHAGE) 1000 MG tablet Take 1 tablet by mouth 2 times daily (with meals)    glimepiride (AMARYL) 2 MG tablet Take 1 tablet by mouth daily    JARDIANCE 10 MG tablet Take 1 tablet by mouth daily    olmesartan-hydroCHLOROthiazide (BENICAR HCT) 40-25 MG per tablet Take 1 tablet by mouth daily    atorvastatin (LIPITOR) 10 MG tablet Take 1 tablet by mouth at bedtime    montelukast (SINGULAIR) 10 MG tablet TAKE 1 TABLET BY MOUTH EVERY DAY     No current facility-administered medications for this visit.         Past Medical History:   Diagnosis Date    Chest pain 8/11/2016    Chronic cellulitis     right leg \"blood flows opposite in both legs\", currently on Amoxicillin    Diabetes ([facility] [address]     type 2-  oral meds- avg bs 90s- denies hypo History of kidney stones     multiple episodes    Hypertension     controlled with benicar    Obesity     BMI 31    Unspecified adverse effect of anesthesia 1997    delayed awakening after sinus surgery 1997--         Past Surgical History:   Procedure Laterality Date    CERVICAL FUSION  6-20-12    redo of C6C7 fusion @ [facility]- with hardware/ graft    CERVICAL FUSION      c6-c7    COLONOSCOPY      HERNIA REPAIR      hernia x 2   inguinal    KNEE ARTHROSCOPY Left 2013    KNEE ARTHROSCOPY Right 2012    MOHS SURGERY Right     ORTHOPEDIC SURGERY Right     carpal tunnel rt;     SINUS SURGERY PROC UNLISTED  1997    sinus    UROLOGICAL SURGERY  2002    kidney stone, cysto        Social History     Socioeconomic History    Marital status:      Spouse name: Not on file    Number of children: Not on file    Years of education: Not on file    Highest education level: Not on file   Occupational History    Not on file   Tobacco Use    Smoking status: Never    Smokeless tobacco: Never   Substance and Sexual Activity    Alcohol use: Yes    Drug use: No     Types: OTC, Prescription    Sexual activity: Not on file   Other Topics Concern    Not on file   Social History Narrative    He has been a lifelong nonsmoker. Occasional EtOH use. Negative for asbestos or TB exposure. He has worked at [employer], previously cleaned machinery with mineral spirits. He has always lived in [address] [place] [address]. He is . Social Determinants of Health     Financial Resource Strain: Medium Risk    Difficulty of Paying Living Expenses: Somewhat hard   Food Insecurity: No Food Insecurity    Worried About Running Out of Food in the Last Year: Never true    Ran Out of Food in the Last Year: Never true   Transportation Needs: Not on file   Physical Activity: Not on file   Stress: Not on file   Social Connections: Not on file   Intimate Partner Violence: Not on file   Housing Stability: Not on file        Review of Systems   Constitutional: Negative. HENT: Negative. Eyes: Negative. Respiratory: Negative. Cardiovascular: Negative. Gastrointestinal: Negative. Endocrine: Negative. Genitourinary: Negative. Skin: Negative. Neurological: Negative. Psychiatric/Behavioral: Negative. BP (!) 166/88   Pulse (!) 121   Temp 97.2 °F (36.2 °C) (Temporal)   Ht 6' (1.829 m)   Wt 241 lb (109.3 kg)   SpO2 99%   BMI 32.69 kg/m²      Physical Exam  Constitutional:       General: He is not in acute distress. Appearance: Normal appearance. HENT:      Head: Normocephalic. Nose: Nose normal.   Eyes:      Conjunctiva/sclera: Conjunctivae normal.   Cardiovascular:      Rate and Rhythm: Normal rate. Pulmonary:      Effort: Pulmonary effort is normal.      Breath sounds: Normal breath sounds. Abdominal:      General: Abdomen is flat. Bowel sounds are normal.      Palpations: Abdomen is soft. Genitourinary:     Penis: Normal.       Testes: Normal.      Prostate: Normal.      Rectum: Normal. Guaiac result negative. Musculoskeletal:         General: Normal range of motion. Cervical back: Normal range of motion. Skin:     General: Skin is warm and dry. Neurological:      General: No focal deficit present. Mental Status: He is alert. Psychiatric:         Mood and Affect: Mood normal.       Nurse Only on 01/27/2023   Component Date Value Ref Range Status    TSH, 3RD GENERATION 01/27/2023 1.830  0.358 - 3.740 uIU/mL Final    PSA 01/27/2023 2.8  <4.0 ng/mL Final    Comment: [employer]. New method in use, please reestablish patient baseline  [employer] LOCI technology. Patient's results of tumor marker testing may not be comparable to labs using different manufacturers/methods.       Color, UA 01/27/2023 YELLOW/STRAW    Final    Color Reference Range: Straw, Yellow or Dark Yellow    Appearance 01/27/2023 CLEAR    Final    Specific Gravity, UA 01/27/2023 1.017  1.001 - 1.023   Final    pH, Urine 01/27/2023 5.0  5.0 - 9.0   Final    Protein, UA 01/27/2023 Negative  Negative mg/dL Final    Glucose, UA 01/27/2023 Negative  mg/dL Final    Ketones, Urine 01/27/2023 Negative  Negative mg/dL Final    Bilirubin Urine 01/27/2023 Negative  Negative   Final    Blood, Urine 01/27/2023 Negative  Negative   Final    Urobilinogen, Urine 01/27/2023 0.2  0.2 - 1.0 EU/dL Final    Nitrite, Urine 01/27/2023 Negative  Negative   Final    Leukocyte Esterase, Urine 01/27/2023 Negative  Negative   Final    Urine Culture if Indicated 01/27/2023 CULTURE NOT INDICATED BY UA RESULT    Final    WBC, UA 01/27/2023 0-4  0 - 4 /hpf Final    RBC, UA 01/27/2023 0-5  0 - 5 /hpf Final    BACTERIA, URINE 01/27/2023 Negative  Negative /hpf Final    Epithelial Cells UA 01/27/2023 0-5  0 - 5 /hpf Final    Casts 01/27/2023 0-2  0 - 2 /lpf Final    Mucus, UA 01/27/2023 0  0 /lpf Final    Cholesterol, Total 01/27/2023 133  <200 MG/DL Final    Comment: Borderline High: 200-239 mg/dL  High: Greater than or equal to 240 mg/dL      Triglycerides 01/27/2023 410 (A)  35 - 150 MG/DL Final    Comment: VERIFIED BY DILUTION  Borderline High: 150-199 mg/dL, High: 200-499 mg/dL  Very High: Greater than or equal to 500 mg/dL  Borderline High: 150-199 mg/dL, High: 200-499 mg/dL  Very High: Greater than or equal to 500 mg/dL      HDL 01/27/2023 25 (A)  40 - 60 MG/DL Final    LDL Calculated 01/27/2023 Not calculated due to elevated triglyceride level  <100 MG/DL Final    VLDL Cholesterol Calculated 01/27/2023 82 (A)  6.0 - 23.0 MG/DL Final    Chol/HDL Ratio 01/27/2023 5.3    Final    Sodium 01/27/2023 139  133 - 143 mmol/L Final    Potassium 01/27/2023 4.4  3.5 - 5.1 mmol/L Final    Chloride 01/27/2023 103  101 - 110 mmol/L Final    CO2 01/27/2023 27  21 - 32 mmol/L Final    Anion Gap 01/27/2023 9  2 - 11 mmol/L Final    Glucose 01/27/2023 149 (A)  65 - 100 mg/dL Final    BUN 01/27/2023 24 (A)  8 - 23 MG/DL Final    Creatinine 01/27/2023 1.30  0.8 - 1.5 MG/DL Final    Est, Glom Filt Rate 01/27/2023 >60  >60 ml/min/1.73m2 Final    Comment:   Pediatric calculator link: [employer].at. org/professionals/kdoqi/gfr_calculatorped    These results are not intended for use in patients <25years of age. eGFR results are calculated without a race factor using  the 2021 CKD-EPI equation. Careful clinical correlation is recommended, particularly when comparing to results calculated using previous equations. The CKD-EPI equation is less accurate in patients with extremes of muscle mass, extra-renal metabolism of creatinine, excessive creatine ingestion, or following therapy that affects renal tubular secretion.       Calcium 01/27/2023 10.1  8.3 - 10.4 MG/DL Final    Total Bilirubin 01/27/2023 0.5  0.2 - 1.1 MG/DL Final    ALT 01/27/2023 32  12 - 65 U/L Final    AST 01/27/2023 15  15 - 37 U/L Final    Alk Phosphatase 01/27/2023 86  50 - 136 U/L Final    Total Protein 01/27/2023 7.2  6.3 - 8.2 g/dL Final    Albumin 01/27/2023 3.8  3.2 - 4.6 g/dL Final    Globulin 01/27/2023 3.4  2.8 - 4.5 g/dL Final    Albumin/Globulin Ratio 01/27/2023 1.1  0.4 - 1.6   Final    WBC 01/27/2023 6.7  4.3 - 11.1 K/uL Final    RBC 01/27/2023 4.23  4.23 - 5.6 M/uL Final    Hemoglobin 01/27/2023 14.4  13.6 - 17.2 g/dL Final    Hematocrit 01/27/2023 41.6  41.1 - 50.3 % Final    MCV 01/27/2023 98.3  82 - 102 FL Final    MCH 01/27/2023 34.0 (A)  26.1 - 32.9 PG Final    MCHC 01/27/2023 34.6  31.4 - 35.0 g/dL Final    RDW 01/27/2023 13.2  11.9 - 14.6 % Final    Platelets 24/42/9108 248  150 - 450 K/uL Final    MPV 01/27/2023 10.4  9.4 - 12.3 FL Final    nRBC 01/27/2023 0.00  0.0 - 0.2 K/uL Final    **Note: Absolute NRBC parameter is now reported with Hemogram**    Differential Type 01/27/2023 AUTOMATED    Final    Seg Neutrophils 01/27/2023 47  43 - 78 % Final    Lymphocytes 01/27/2023 31  13 - 44 % Final    Monocytes 01/27/2023 11  4.0 - 12.0 % Final    Eosinophils % 01/27/2023 10 (A)  0.5 - 7.8 % Final    Basophils 01/27/2023 1  0.0 - 2.0 % Final    Immature Granulocytes 01/27/2023 0  0.0 - 5.0 % Final    Segs Absolute 01/27/2023 3.2  1.7 - 8.2 K/UL Final    Absolute Lymph # 01/27/2023 2.1  0.5 - 4.6 K/UL Final    Absolute Mono # 01/27/2023 0.7  0.1 - 1.3 K/UL Final    Absolute Eos # 01/27/2023 0.7  0.0 - 0.8 K/UL Final    Basophils Absolute 01/27/2023 0.1  0.0 - 0.2 K/UL Final    Absolute Immature Granulocyte 01/27/2023 0.0  0.0 - 0.5 K/UL Final    LDL Direct 01/27/2023 67  <100 mg/dl Final    Comment: Near Optimal: 100-129 mg/dL  Borderline High: 130-159, High: 160-189 mg/dL  Very High: Greater than or equal to 190 mg/dL         ASSESSMENT and PLAN    Encounter for annual physical exam  -     AMB POC FECAL BLOOD, OCCULT, QL 1 CARD  Type 2 diabetes mellitus with diabetic polyneuropathy, without long-term current use of insulin ([facility])  -     AMB POC HEMOGLOBIN A1C  Obstructive sleep apnea (adult) (pediatric)  Gastro-esophageal reflux disease without esophagitis  Mixed hyperlipidemia  Mild intermittent asthma with acute exacerbation  The patient was seen today for the annual preventive health visit. The patient was provided anticipatory guidance and counseling regarding age / sex appropriate health maintenance issues including vaccinations, blood pressure screening, lipid screening, cancer screenings, diet, exercise, avoidance of tobacco / substance abuse. Current treatment plan is effective. no changes in therapy  lab results and schedule for future lab studies reviewed with patient  reviewed diet, exercise and weight control   Cardiovascular risk and specific lipid/ LDL goals reviewed    No follow-ups on file.      Vamsi Hopson MD

## 2023-03-12 ENCOUNTER — PATIENT MESSAGE (OUTPATIENT)
Dept: ENDOCRINOLOGY | Age: 63
End: 2023-03-12

## 2023-03-12 DIAGNOSIS — E11.65 CONTROLLED TYPE 2 DIABETES MELLITUS WITH HYPERGLYCEMIA, WITHOUT LONG-TERM CURRENT USE OF INSULIN (HCC): Primary | ICD-10-CM

## 2023-03-15 RX ORDER — BLOOD-GLUCOSE SENSOR
EACH MISCELLANEOUS
Qty: 6 EACH | Refills: 3 | Status: SHIPPED | OUTPATIENT
Start: 2023-03-15

## 2023-03-15 NOTE — TELEPHONE ENCOUNTER
Rosaura Valderrama MA 3/15/2023 8:03 AM EDT      ----- Message -----  From: Eamon Cueto Sr.  Sent: 3/15/2023 4:09 AM EDT  To: [facility] Clinical Staff  Subject: Diabetes followup     Yes

## 2023-04-05 RX ORDER — SILDENAFIL 100 MG/1
TABLET, FILM COATED ORAL
Qty: 30 TABLET | Refills: 3 | Status: SHIPPED | OUTPATIENT
Start: 2023-04-05

## 2023-04-10 DIAGNOSIS — E78.2 MIXED HYPERLIPIDEMIA: ICD-10-CM

## 2023-04-24 RX ORDER — ATORVASTATIN CALCIUM 10 MG/1
TABLET, FILM COATED ORAL
Qty: 30 TABLET | OUTPATIENT
Start: 2023-04-24

## 2023-06-20 DIAGNOSIS — E78.2 MIXED HYPERLIPIDEMIA: ICD-10-CM

## 2023-06-20 DIAGNOSIS — E11.65 CONTROLLED TYPE 2 DIABETES MELLITUS WITH HYPERGLYCEMIA, WITHOUT LONG-TERM CURRENT USE OF INSULIN (HCC): ICD-10-CM

## 2023-06-20 LAB
ALBUMIN SERPL-MCNC: 3.7 G/DL (ref 3.2–4.6)
ALBUMIN/GLOB SERPL: 1 (ref 0.4–1.6)
ALP SERPL-CCNC: 88 U/L (ref 50–136)
ALT SERPL-CCNC: 27 U/L (ref 12–65)
ANION GAP SERPL CALC-SCNC: 7 MMOL/L (ref 2–11)
AST SERPL-CCNC: 17 U/L (ref 15–37)
BILIRUB SERPL-MCNC: 0.9 MG/DL (ref 0.2–1.1)
BUN SERPL-MCNC: 19 MG/DL (ref 8–23)
CALCIUM SERPL-MCNC: 10 MG/DL (ref 8.3–10.4)
CHLORIDE SERPL-SCNC: 107 MMOL/L (ref 101–110)
CHOLEST SERPL-MCNC: 130 MG/DL
CO2 SERPL-SCNC: 23 MMOL/L (ref 21–32)
CREAT SERPL-MCNC: 1.2 MG/DL (ref 0.8–1.5)
GLOBULIN SER CALC-MCNC: 3.8 G/DL (ref 2.8–4.5)
GLUCOSE SERPL-MCNC: 135 MG/DL (ref 65–100)
HDLC SERPL-MCNC: 37 MG/DL (ref 40–60)
HDLC SERPL: 3.5
LDLC SERPL CALC-MCNC: 73 MG/DL
POTASSIUM SERPL-SCNC: 3.8 MMOL/L (ref 3.5–5.1)
PROT SERPL-MCNC: 7.5 G/DL (ref 6.3–8.2)
SODIUM SERPL-SCNC: 137 MMOL/L (ref 133–143)
TRIGL SERPL-MCNC: 100 MG/DL (ref 35–150)
VLDLC SERPL CALC-MCNC: 20 MG/DL (ref 6–23)

## 2023-06-21 LAB
CREAT UR-MCNC: 105 MG/DL
EST. AVERAGE GLUCOSE BLD GHB EST-MCNC: 117 MG/DL
HBA1C MFR BLD: 5.7 % (ref 4.8–5.6)
MICROALBUMIN UR-MCNC: 1.59 MG/DL
MICROALBUMIN/CREAT UR-RTO: 15 MG/G (ref 0–30)

## 2023-07-07 ENCOUNTER — OFFICE VISIT (OUTPATIENT)
Dept: ENDOCRINOLOGY | Age: 63
End: 2023-07-07
Payer: COMMERCIAL

## 2023-07-07 VITALS
BODY MASS INDEX: 28.21 KG/M2 | SYSTOLIC BLOOD PRESSURE: 164 MMHG | DIASTOLIC BLOOD PRESSURE: 102 MMHG | OXYGEN SATURATION: 96 % | WEIGHT: 208 LBS | HEART RATE: 97 BPM

## 2023-07-07 DIAGNOSIS — I10 ESSENTIAL HYPERTENSION: ICD-10-CM

## 2023-07-07 DIAGNOSIS — E78.2 MIXED HYPERLIPIDEMIA: ICD-10-CM

## 2023-07-07 DIAGNOSIS — E11.65 CONTROLLED TYPE 2 DIABETES MELLITUS WITH HYPERGLYCEMIA, WITHOUT LONG-TERM CURRENT USE OF INSULIN (HCC): Primary | ICD-10-CM

## 2023-07-07 PROCEDURE — 3080F DIAST BP >= 90 MM HG: CPT | Performed by: INTERNAL MEDICINE

## 2023-07-07 PROCEDURE — 99214 OFFICE O/P EST MOD 30 MIN: CPT | Performed by: INTERNAL MEDICINE

## 2023-07-07 PROCEDURE — 3044F HG A1C LEVEL LT 7.0%: CPT | Performed by: INTERNAL MEDICINE

## 2023-07-07 PROCEDURE — 3077F SYST BP >= 140 MM HG: CPT | Performed by: INTERNAL MEDICINE

## 2023-07-07 ASSESSMENT — ENCOUNTER SYMPTOMS
CONSTIPATION: 0
SHORTNESS OF BREATH: 0
VOMITING: 0
NAUSEA: 0
DIARRHEA: 0

## 2023-07-07 NOTE — PROGRESS NOTES
José Patel MD, [facility]  [address] [address], [address]  Phone [phone]  Facsimile [phone]          Chase Berg is a 61 y.o. male seen 7/7/2023 for follow-up of type 2 diabetes mellitus            ASSESSMENT AND PLAN:    1. Controlled type 2 diabetes mellitus with hyperglycemia, without long-term current use of insulin (HCC)  His overall glycemic control is very good and his hemoglobin A1c is at goal less than 7. He has discontinued all his diabetes medications on his own and is controlling his diabetes with diet and exercise. Eye exam negative 7/2021 and I again instructed him to make an appointment. Urine microalbumin negative 6/2023. He has no ongoing need for subspecialty care and I will release him back to his primary care physician. 2. Essential hypertension  Followed by his primary care physician. He discontinued his antihypertensives and his blood pressure is quite elevated today. He will discuss this with his primary care physician. 3. Mixed hyperlipidemia  He discontinued his atorvastatin as well. LDL not at goal less than 70 6/2023. We discussed that he would likely benefit from resuming his atorvastatin. Follow-up and Dispositions    Return As needed. HISTORY OF PRESENT ILLNESS:    DIABETES MELLITUS     Diagnosis: Type 2 diabetes mellitus diagnosed around 2015. Diet: He avoids sweets. No sweet tea or regular soft drinks. He tries to limit carbohydrates. He has been following intermittent fasting. Exercise: Walking 3-10 miles per day. Monitoring: [place] - once every 2-3 days. Blood Glucose: By recall: 117-208. Hypoglycemia: +Hypoglycemic awareness. No recent episodes. Hemoglobin A1c:  8/27/2020: 6.6.  11/17/2020: 5.8.  3/16/2021: 6.3.  7/26/2021: 5.8.  1/30/2023: 6.3.  6/20/2023: 5.7.      Microalbumin/Nephropathy:  8/27/2020: Creatinine 0.99 (GFR

## 2023-07-20 ENCOUNTER — OFFICE VISIT (OUTPATIENT)
Dept: FAMILY MEDICINE CLINIC | Facility: CLINIC | Age: 63
End: 2023-07-20
Payer: COMMERCIAL

## 2023-07-20 VITALS
TEMPERATURE: 97 F | HEIGHT: 72 IN | HEART RATE: 100 BPM | WEIGHT: 208 LBS | BODY MASS INDEX: 28.17 KG/M2 | SYSTOLIC BLOOD PRESSURE: 126 MMHG | DIASTOLIC BLOOD PRESSURE: 78 MMHG | OXYGEN SATURATION: 99 %

## 2023-07-20 DIAGNOSIS — E11.42 TYPE 2 DIABETES MELLITUS WITH DIABETIC POLYNEUROPATHY, WITHOUT LONG-TERM CURRENT USE OF INSULIN (HCC): Primary | ICD-10-CM

## 2023-07-20 DIAGNOSIS — K21.9 GASTRO-ESOPHAGEAL REFLUX DISEASE WITHOUT ESOPHAGITIS: ICD-10-CM

## 2023-07-20 PROCEDURE — 99213 OFFICE O/P EST LOW 20 MIN: CPT | Performed by: FAMILY MEDICINE

## 2023-07-20 PROCEDURE — 3078F DIAST BP <80 MM HG: CPT | Performed by: FAMILY MEDICINE

## 2023-07-20 PROCEDURE — 3074F SYST BP LT 130 MM HG: CPT | Performed by: FAMILY MEDICINE

## 2023-07-20 PROCEDURE — 3044F HG A1C LEVEL LT 7.0%: CPT | Performed by: FAMILY MEDICINE

## 2023-07-20 RX ORDER — METHYLPREDNISOLONE 4 MG/1
TABLET ORAL
Qty: 1 KIT | Refills: 0 | Status: SHIPPED | OUTPATIENT
Start: 2023-07-20 | End: 2023-07-26

## 2023-07-20 SDOH — ECONOMIC STABILITY: HOUSING INSECURITY
IN THE LAST 12 MONTHS, WAS THERE A TIME WHEN YOU DID NOT HAVE A STEADY PLACE TO SLEEP OR SLEPT IN A SHELTER (INCLUDING NOW)?: NO

## 2023-07-20 SDOH — ECONOMIC STABILITY: INCOME INSECURITY: HOW HARD IS IT FOR YOU TO PAY FOR THE VERY BASICS LIKE FOOD, HOUSING, MEDICAL CARE, AND HEATING?: NOT HARD AT ALL

## 2023-07-20 SDOH — ECONOMIC STABILITY: FOOD INSECURITY: WITHIN THE PAST 12 MONTHS, THE FOOD YOU BOUGHT JUST DIDN'T LAST AND YOU DIDN'T HAVE MONEY TO GET MORE.: NEVER TRUE

## 2023-07-20 SDOH — ECONOMIC STABILITY: FOOD INSECURITY: WITHIN THE PAST 12 MONTHS, YOU WORRIED THAT YOUR FOOD WOULD RUN OUT BEFORE YOU GOT MONEY TO BUY MORE.: NEVER TRUE

## 2023-08-06 ASSESSMENT — ENCOUNTER SYMPTOMS
EYES NEGATIVE: 1
RESPIRATORY NEGATIVE: 1
GASTROINTESTINAL NEGATIVE: 1

## 2023-09-06 RX ORDER — MONTELUKAST SODIUM 10 MG/1
TABLET ORAL
Qty: 30 TABLET | Refills: 0 | OUTPATIENT
Start: 2023-09-06

## 2023-09-27 RX ORDER — MONTELUKAST SODIUM 10 MG/1
10 TABLET ORAL DAILY
Qty: 30 TABLET | Refills: 0 | OUTPATIENT
Start: 2023-09-27

## 2023-09-29 RX ORDER — MONTELUKAST SODIUM 10 MG/1
10 TABLET ORAL DAILY
Qty: 30 TABLET | Refills: 0 | Status: SHIPPED | OUTPATIENT
Start: 2023-09-29

## 2023-09-29 NOTE — TELEPHONE ENCOUNTER
Refill:  Singulair  Dosage: 10 mg  Freq: qd  To: [facility] in [place]    Patient is going out of town and needs the medication ASAP, please.

## 2023-10-25 RX ORDER — SILDENAFIL 100 MG/1
100 TABLET, FILM COATED ORAL PRN
Qty: 30 TABLET | Refills: 3 | OUTPATIENT
Start: 2023-10-25

## 2023-10-25 RX ORDER — MONTELUKAST SODIUM 10 MG/1
10 TABLET ORAL DAILY
Qty: 30 TABLET | Refills: 0 | OUTPATIENT
Start: 2023-10-25

## 2023-11-17 RX ORDER — SILDENAFIL 100 MG/1
100 TABLET, FILM COATED ORAL PRN
Qty: 30 TABLET | Refills: 3 | OUTPATIENT
Start: 2023-11-17

## 2023-11-17 RX ORDER — MONTELUKAST SODIUM 10 MG/1
10 TABLET ORAL DAILY
Qty: 30 TABLET | Refills: 0 | OUTPATIENT
Start: 2023-11-17

## 2023-11-28 RX ORDER — MONTELUKAST SODIUM 10 MG/1
10 TABLET ORAL DAILY
Qty: 30 TABLET | Refills: 0 | OUTPATIENT
Start: 2023-11-28

## 2023-11-28 RX ORDER — MONTELUKAST SODIUM 10 MG/1
10 TABLET ORAL DAILY
Qty: 30 TABLET | Refills: 0 | Status: SHIPPED | OUTPATIENT
Start: 2023-11-28

## 2023-11-28 RX ORDER — SILDENAFIL 100 MG/1
100 TABLET, FILM COATED ORAL PRN
Qty: 30 TABLET | Refills: 3 | OUTPATIENT
Start: 2023-11-28

## 2023-11-28 RX ORDER — SILDENAFIL 100 MG/1
100 TABLET, FILM COATED ORAL PRN
Qty: 30 TABLET | Refills: 3 | Status: SHIPPED | OUTPATIENT
Start: 2023-11-28

## 2023-12-26 RX ORDER — MONTELUKAST SODIUM 10 MG/1
10 TABLET ORAL DAILY
Qty: 30 TABLET | Refills: 0 | OUTPATIENT
Start: 2023-12-26

## 2024-01-23 DIAGNOSIS — Z12.5 SCREENING PSA (PROSTATE SPECIFIC ANTIGEN): ICD-10-CM

## 2024-01-23 DIAGNOSIS — E78.2 MIXED HYPERLIPIDEMIA: ICD-10-CM

## 2024-01-23 DIAGNOSIS — E11.42 TYPE 2 DIABETES MELLITUS WITH DIABETIC POLYNEUROPATHY, WITHOUT LONG-TERM CURRENT USE OF INSULIN (HCC): ICD-10-CM

## 2024-01-23 DIAGNOSIS — Z00.00 ANNUAL PHYSICAL EXAM: Primary | ICD-10-CM

## 2024-01-24 ENCOUNTER — NURSE ONLY (OUTPATIENT)
Dept: FAMILY MEDICINE CLINIC | Facility: CLINIC | Age: 64
End: 2024-01-24

## 2024-01-24 DIAGNOSIS — Z00.00 ANNUAL PHYSICAL EXAM: ICD-10-CM

## 2024-01-24 DIAGNOSIS — E11.42 TYPE 2 DIABETES MELLITUS WITH DIABETIC POLYNEUROPATHY, WITHOUT LONG-TERM CURRENT USE OF INSULIN (HCC): ICD-10-CM

## 2024-01-24 DIAGNOSIS — E78.2 MIXED HYPERLIPIDEMIA: ICD-10-CM

## 2024-01-24 DIAGNOSIS — Z12.5 SCREENING PSA (PROSTATE SPECIFIC ANTIGEN): ICD-10-CM

## 2024-01-24 LAB
ALBUMIN SERPL-MCNC: 3.8 G/DL (ref 3.2–4.6)
ALBUMIN/GLOB SERPL: 1.2 (ref 0.4–1.6)
ALP SERPL-CCNC: 117 U/L (ref 50–136)
ALT SERPL-CCNC: 34 U/L (ref 12–65)
ANION GAP SERPL CALC-SCNC: 5 MMOL/L (ref 2–11)
APPEARANCE UR: CLEAR
AST SERPL-CCNC: 17 U/L (ref 15–37)
BACTERIA URNS QL MICRO: NEGATIVE /HPF
BASOPHILS # BLD: 0.1 K/UL (ref 0–0.2)
BASOPHILS NFR BLD: 1 % (ref 0–2)
BILIRUB SERPL-MCNC: <0.1 MG/DL (ref 0.2–1.1)
BILIRUB UR QL: NEGATIVE
BUN SERPL-MCNC: 23 MG/DL (ref 8–23)
CALCIUM SERPL-MCNC: 9.5 MG/DL (ref 8.3–10.4)
CASTS URNS QL MICRO: NORMAL /LPF (ref 0–2)
CHLORIDE SERPL-SCNC: 103 MMOL/L (ref 103–113)
CHOLEST SERPL-MCNC: 207 MG/DL
CO2 SERPL-SCNC: 26 MMOL/L (ref 21–32)
COLOR UR: NORMAL
CREAT SERPL-MCNC: 1.3 MG/DL (ref 0.8–1.5)
DIFFERENTIAL METHOD BLD: ABNORMAL
EOSINOPHIL # BLD: 0.4 K/UL (ref 0–0.8)
EOSINOPHIL NFR BLD: 6 % (ref 0.5–7.8)
EPI CELLS #/AREA URNS HPF: NORMAL /HPF (ref 0–5)
ERYTHROCYTE [DISTWIDTH] IN BLOOD BY AUTOMATED COUNT: 11.5 % (ref 11.9–14.6)
EST. AVERAGE GLUCOSE BLD GHB EST-MCNC: 186 MG/DL
GLOBULIN SER CALC-MCNC: 3.2 G/DL (ref 2.8–4.5)
GLUCOSE SERPL-MCNC: 293 MG/DL (ref 65–100)
GLUCOSE UR STRIP.AUTO-MCNC: 500 MG/DL
HBA1C MFR BLD: 8.1 % (ref 4.8–5.6)
HCT VFR BLD AUTO: 39 % (ref 41.1–50.3)
HDLC SERPL-MCNC: 25 MG/DL (ref 40–60)
HDLC SERPL: 8.3
HGB BLD-MCNC: 13.9 G/DL (ref 13.6–17.2)
HGB UR QL STRIP: NEGATIVE
IMM GRANULOCYTES # BLD AUTO: 0 K/UL (ref 0–0.5)
IMM GRANULOCYTES NFR BLD AUTO: 1 % (ref 0–5)
KETONES UR QL STRIP.AUTO: NEGATIVE MG/DL
LDLC SERPL CALC-MCNC: ABNORMAL MG/DL
LDLC SERPL DIRECT ASSAY-MCNC: 72 MG/DL
LEUKOCYTE ESTERASE UR QL STRIP.AUTO: NEGATIVE
LYMPHOCYTES # BLD: 1.7 K/UL (ref 0.5–4.6)
LYMPHOCYTES NFR BLD: 27 % (ref 13–44)
MCH RBC QN AUTO: 32.9 PG (ref 26.1–32.9)
MCHC RBC AUTO-ENTMCNC: 35.6 G/DL (ref 31.4–35)
MCV RBC AUTO: 92.2 FL (ref 82–102)
MONOCYTES # BLD: 0.6 K/UL (ref 0.1–1.3)
MONOCYTES NFR BLD: 9 % (ref 4–12)
MUCOUS THREADS URNS QL MICRO: 0 /LPF
NEUTS SEG # BLD: 3.7 K/UL (ref 1.7–8.2)
NEUTS SEG NFR BLD: 56 % (ref 43–78)
NITRITE UR QL STRIP.AUTO: NEGATIVE
NRBC # BLD: 0 K/UL (ref 0–0.2)
PH UR STRIP: 5 (ref 5–9)
PLATELET # BLD AUTO: 215 K/UL (ref 150–450)
PMV BLD AUTO: 10.1 FL (ref 9.4–12.3)
POTASSIUM SERPL-SCNC: 4.4 MMOL/L (ref 3.5–5.1)
PROT SERPL-MCNC: 7 G/DL (ref 6.3–8.2)
PROT UR STRIP-MCNC: NEGATIVE MG/DL
PSA SERPL-MCNC: 2.5 NG/ML
RBC # BLD AUTO: 4.23 M/UL (ref 4.23–5.6)
RBC #/AREA URNS HPF: NORMAL /HPF (ref 0–5)
SODIUM SERPL-SCNC: 134 MMOL/L (ref 136–146)
SP GR UR REFRACTOMETRY: 1.02 (ref 1–1.02)
TRIGL SERPL-MCNC: 803 MG/DL (ref 35–150)
TSH, 3RD GENERATION: 1.67 UIU/ML (ref 0.36–3.74)
URINE CULTURE IF INDICATED: NORMAL
UROBILINOGEN UR QL STRIP.AUTO: 0.2 EU/DL (ref 0.2–1)
VLDLC SERPL CALC-MCNC: 160.6 MG/DL (ref 6–23)
WBC # BLD AUTO: 6.4 K/UL (ref 4.3–11.1)
WBC URNS QL MICRO: NORMAL /HPF (ref 0–4)

## 2024-01-29 ASSESSMENT — PATIENT HEALTH QUESTIONNAIRE - PHQ9
SUM OF ALL RESPONSES TO PHQ9 QUESTIONS 1 & 2: 0
SUM OF ALL RESPONSES TO PHQ QUESTIONS 1-9: 0
2. FEELING DOWN, DEPRESSED OR HOPELESS: 0
SUM OF ALL RESPONSES TO PHQ QUESTIONS 1-9: 0
1. LITTLE INTEREST OR PLEASURE IN DOING THINGS: NOT AT ALL
1. LITTLE INTEREST OR PLEASURE IN DOING THINGS: 0
SUM OF ALL RESPONSES TO PHQ9 QUESTIONS 1 & 2: 0
SUM OF ALL RESPONSES TO PHQ QUESTIONS 1-9: 0
2. FEELING DOWN, DEPRESSED OR HOPELESS: NOT AT ALL
SUM OF ALL RESPONSES TO PHQ QUESTIONS 1-9: 0

## 2024-02-01 ENCOUNTER — OFFICE VISIT (OUTPATIENT)
Dept: FAMILY MEDICINE CLINIC | Facility: CLINIC | Age: 64
End: 2024-02-01

## 2024-02-01 VITALS
HEIGHT: 72 IN | SYSTOLIC BLOOD PRESSURE: 120 MMHG | OXYGEN SATURATION: 96 % | DIASTOLIC BLOOD PRESSURE: 88 MMHG | HEART RATE: 60 BPM | WEIGHT: 213 LBS | TEMPERATURE: 99 F | BODY MASS INDEX: 28.85 KG/M2

## 2024-02-01 DIAGNOSIS — Z00.00 ANNUAL PHYSICAL EXAM: ICD-10-CM

## 2024-02-01 DIAGNOSIS — E11.42 TYPE 2 DIABETES MELLITUS WITH DIABETIC POLYNEUROPATHY, WITHOUT LONG-TERM CURRENT USE OF INSULIN (HCC): ICD-10-CM

## 2024-02-01 DIAGNOSIS — I49.9 IRREGULAR HEART BEAT: Primary | ICD-10-CM

## 2024-02-01 DIAGNOSIS — I48.91 ATRIAL FIBRILLATION, UNSPECIFIED TYPE (HCC): ICD-10-CM

## 2024-02-01 DIAGNOSIS — E78.5 ELEVATED LIPIDS: ICD-10-CM

## 2024-02-01 DIAGNOSIS — J30.1 SEASONAL ALLERGIC RHINITIS DUE TO POLLEN: ICD-10-CM

## 2024-02-01 DIAGNOSIS — K21.9 GASTRO-ESOPHAGEAL REFLUX DISEASE WITHOUT ESOPHAGITIS: ICD-10-CM

## 2024-02-01 DIAGNOSIS — E78.2 MIXED HYPERLIPIDEMIA: ICD-10-CM

## 2024-02-01 DIAGNOSIS — J45.21 MILD INTERMITTENT ASTHMA WITH ACUTE EXACERBATION: ICD-10-CM

## 2024-02-01 RX ORDER — OLMESARTAN MEDOXOMIL 40 MG/1
20 TABLET ORAL DAILY
COMMUNITY
Start: 2012-02-17 | End: 2024-02-01 | Stop reason: SDUPTHER

## 2024-02-02 RX ORDER — OLMESARTAN MEDOXOMIL 40 MG/1
20 TABLET ORAL DAILY
Qty: 30 TABLET | Refills: 5 | Status: SHIPPED | OUTPATIENT
Start: 2024-02-02

## 2024-02-25 ENCOUNTER — PATIENT MESSAGE (OUTPATIENT)
Dept: FAMILY MEDICINE CLINIC | Facility: CLINIC | Age: 64
End: 2024-02-25

## 2024-02-26 NOTE — TELEPHONE ENCOUNTER
From: Williams Dia Sr.  To: Dr. Yusef Cummings  Sent: 2/25/2024 12:52 PM EST  Subject: Refill valtrex    Told nurse that I needed this during last physical but not on refill list. Anyway I need a refill at [facility]

## 2024-02-27 RX ORDER — VALACYCLOVIR HYDROCHLORIDE 1 G/1
1000 TABLET, FILM COATED ORAL 3 TIMES DAILY
Qty: 90 TABLET | Refills: 5 | Status: SHIPPED | OUTPATIENT
Start: 2024-02-27

## 2024-03-11 ENCOUNTER — INITIAL CONSULT (OUTPATIENT)
Age: 64
End: 2024-03-11
Payer: COMMERCIAL

## 2024-03-11 VITALS
WEIGHT: 218.6 LBS | DIASTOLIC BLOOD PRESSURE: 78 MMHG | BODY MASS INDEX: 29.61 KG/M2 | SYSTOLIC BLOOD PRESSURE: 139 MMHG | HEART RATE: 97 BPM | HEIGHT: 72 IN

## 2024-03-11 DIAGNOSIS — I49.9 IRREGULAR HEART BEAT: Primary | ICD-10-CM

## 2024-03-11 DIAGNOSIS — I48.19 PERSISTENT ATRIAL FIBRILLATION (HCC): ICD-10-CM

## 2024-03-11 PROCEDURE — 93000 ELECTROCARDIOGRAM COMPLETE: CPT | Performed by: INTERNAL MEDICINE

## 2024-03-11 PROCEDURE — 3078F DIAST BP <80 MM HG: CPT | Performed by: INTERNAL MEDICINE

## 2024-03-11 PROCEDURE — 3075F SYST BP GE 130 - 139MM HG: CPT | Performed by: INTERNAL MEDICINE

## 2024-03-11 PROCEDURE — 99244 OFF/OP CNSLTJ NEW/EST MOD 40: CPT | Performed by: INTERNAL MEDICINE

## 2024-03-11 RX ORDER — METOPROLOL SUCCINATE 25 MG/1
25 TABLET, EXTENDED RELEASE ORAL DAILY
Qty: 90 TABLET | Refills: 3 | Status: SHIPPED | OUTPATIENT
Start: 2024-03-11

## 2024-03-11 RX ORDER — ASPIRIN 81 MG/1
81 TABLET ORAL DAILY
COMMUNITY
End: 2024-03-11

## 2024-03-11 RX ORDER — SODIUM CHLORIDE 9 MG/ML
INJECTION, SOLUTION INTRAVENOUS PRN
OUTPATIENT
Start: 2024-03-11

## 2024-03-11 RX ORDER — SODIUM CHLORIDE 0.9 % (FLUSH) 0.9 %
5-40 SYRINGE (ML) INJECTION PRN
OUTPATIENT
Start: 2024-03-11

## 2024-03-11 RX ORDER — SODIUM CHLORIDE 0.9 % (FLUSH) 0.9 %
5-40 SYRINGE (ML) INJECTION EVERY 12 HOURS SCHEDULED
OUTPATIENT
Start: 2024-03-11

## 2024-03-11 ASSESSMENT — ENCOUNTER SYMPTOMS: SHORTNESS OF BREATH: 0

## 2024-03-11 NOTE — PROGRESS NOTES
Component Value Date/Time    CHOL 207 01/24/2024 09:28 AM    HDL 25 01/24/2024 09:28 AM    VLDL 73 04/05/2022 08:22 AM       No results found for any visits on 03/11/24.     ASSESSMENT/PLAN    Irregular heart beat  -     EKG 12 Lead  Persistent atrial fibrillation (HCC)  -     apixaban (ELIQUIS) 5 MG TABS tablet; Take 1 tablet by mouth 2 times daily, Disp-60 tablet, R-11Normal  -     metoprolol succinate (TOPROL XL) 25 MG extended release tablet; Take 1 tablet by mouth daily, Disp-90 tablet, R-3Normal  -     JULIANO with possible cardioversion (PRN contrast/bubble/3D); Future  - ECG today shows atrial fibrillation. Will obtain JULIANO/DCCV for next steps in management.  - Spontaneously rate controlled. Will initiate low dose metoprolol succinate 25mg.  - [id number] 2 (HTN, DM). Start apixaban 5mg BID.    Return in about 6 weeks (around 4/22/2024) for Routine follow up.       Thank you for allowing me to participate in this patient's care.  Please call or contact me if there are any questions or concerns regarding the above.      Danny Sanchez,   03/11/24  11:07 AM      Proofread, but unrecognized errors may exist.

## 2024-03-12 ENCOUNTER — TELEPHONE (OUTPATIENT)
Age: 64
End: 2024-03-12

## 2024-03-12 NOTE — TELEPHONE ENCOUNTER
Spoke with pt regarding medication Eliquis. Provided pt with two options patient assistance or [facility]. Pt wants to proceed with [facility]. Provided contact number. Advised [facility]. Pt voiced understanding.

## 2024-03-12 NOTE — TELEPHONE ENCOUNTER
MEDICATION REFILL REQUEST      Name of Medication:  Apixaban   Dose:  5 mg  Frequency:  twice a day   Quantity:    Days' supply:  90      Pharm[facility] [id number]    MEDICATION REFILL REQUEST      Name of Medication:  Metoprolol   Dose:  25 mg  Frequency:  daily   Quantity:    Days' supply:  90      Ph[facility] [id number]

## 2024-04-02 NOTE — PROGRESS NOTES
Patient pre-assessment complete for Williams Dia scheduled for JULIANO/CVN, arrival time 0830. Patient verified using . Patient instructed to bring a list of all home medications on the day of procedure. NPO status reinforced. Patient instructed to take eliquis the morning of the procedure.Instructed they can take all other medications excluding vitamins & supplements. Patient verbalizes understanding of all instructions & denies any questions at this time.

## 2024-04-03 ENCOUNTER — HOSPITAL ENCOUNTER (OUTPATIENT)
Dept: CARDIAC CATH/INVASIVE PROCEDURES | Age: 64
Setting detail: SURGERY ADMIT
Discharge: HOME | End: 2024-04-03
Attending: INTERNAL MEDICINE | Admitting: FAMILY MEDICINE
Payer: COMMERCIAL

## 2024-04-03 VITALS
SYSTOLIC BLOOD PRESSURE: 116 MMHG | BODY MASS INDEX: 28.17 KG/M2 | OXYGEN SATURATION: 98 % | DIASTOLIC BLOOD PRESSURE: 82 MMHG | TEMPERATURE: 97.8 F | WEIGHT: 208 LBS | HEART RATE: 75 BPM | RESPIRATION RATE: 12 BRPM | HEIGHT: 72 IN

## 2024-04-03 DIAGNOSIS — I48.19 PERSISTENT ATRIAL FIBRILLATION (HCC): ICD-10-CM

## 2024-04-03 LAB
ANION GAP SERPL CALC-SCNC: 2 MMOL/L (ref 2–11)
BUN SERPL-MCNC: 21 MG/DL (ref 8–23)
CALCIUM SERPL-MCNC: 9.6 MG/DL (ref 8.3–10.4)
CHLORIDE SERPL-SCNC: 104 MMOL/L (ref 103–113)
CO2 SERPL-SCNC: 29 MMOL/L (ref 21–32)
CREAT SERPL-MCNC: 1.3 MG/DL (ref 0.8–1.5)
ECHO AO DESC DIAM: 2.5 CM
ECHO AO DESCENDING AORTA INDEX: 1.15 CM/M2
ECHO BSA: 2.19 M2
EKG ATRIAL RATE: 72 BPM
EKG DIAGNOSIS: NORMAL
EKG DIAGNOSIS: NORMAL
EKG P AXIS: 40 DEGREES
EKG P-R INTERVAL: 180 MS
EKG Q-T INTERVAL: 360 MS
EKG Q-T INTERVAL: 390 MS
EKG QRS DURATION: 86 MS
EKG QRS DURATION: 92 MS
EKG QTC CALCULATION (BAZETT): 427 MS
EKG QTC CALCULATION (BAZETT): 430 MS
EKG R AXIS: -17 DEGREES
EKG R AXIS: -17 DEGREES
EKG T AXIS: 14 DEGREES
EKG T AXIS: 7 DEGREES
EKG VENTRICULAR RATE: 72 BPM
EKG VENTRICULAR RATE: 86 BPM
ERYTHROCYTE [DISTWIDTH] IN BLOOD BY AUTOMATED COUNT: 11.5 % (ref 11.9–14.6)
GLUCOSE SERPL-MCNC: 240 MG/DL (ref 65–100)
HCT VFR BLD AUTO: 41.3 % (ref 41.1–50.3)
HGB BLD-MCNC: 15.5 G/DL (ref 13.6–17.2)
MAGNESIUM SERPL-MCNC: 1.6 MG/DL (ref 1.8–2.4)
MCH RBC QN AUTO: 34.6 PG (ref 26.1–32.9)
MCHC RBC AUTO-ENTMCNC: 37.5 G/DL (ref 31.4–35)
MCV RBC AUTO: 92.2 FL (ref 82–102)
NRBC # BLD: 0 K/UL (ref 0–0.2)
PLATELET # BLD AUTO: 173 K/UL (ref 150–450)
PMV BLD AUTO: 9.7 FL (ref 9.4–12.3)
POTASSIUM SERPL-SCNC: 4.5 MMOL/L (ref 3.5–5.1)
RBC # BLD AUTO: 4.48 M/UL (ref 4.23–5.6)
SODIUM SERPL-SCNC: 135 MMOL/L (ref 136–146)
WBC # BLD AUTO: 6.7 K/UL (ref 4.3–11.1)

## 2024-04-03 PROCEDURE — 99152 MOD SED SAME PHYS/QHP 5/>YRS: CPT

## 2024-04-03 PROCEDURE — 85027 COMPLETE CBC AUTOMATED: CPT

## 2024-04-03 PROCEDURE — 99152 MOD SED SAME PHYS/QHP 5/>YRS: CPT | Performed by: INTERNAL MEDICINE

## 2024-04-03 PROCEDURE — 6360000002 HC RX W HCPCS: Performed by: INTERNAL MEDICINE

## 2024-04-03 PROCEDURE — 93005 ELECTROCARDIOGRAM TRACING: CPT | Performed by: INTERNAL MEDICINE

## 2024-04-03 PROCEDURE — 6360000004 HC RX CONTRAST MEDICATION: Performed by: INTERNAL MEDICINE

## 2024-04-03 PROCEDURE — 92960 CARDIOVERSION ELECTRIC EXT: CPT

## 2024-04-03 PROCEDURE — 99153 MOD SED SAME PHYS/QHP EA: CPT | Performed by: INTERNAL MEDICINE

## 2024-04-03 PROCEDURE — 99153 MOD SED SAME PHYS/QHP EA: CPT

## 2024-04-03 PROCEDURE — 83735 ASSAY OF MAGNESIUM: CPT

## 2024-04-03 PROCEDURE — 80048 BASIC METABOLIC PNL TOTAL CA: CPT

## 2024-04-03 PROCEDURE — A4216 STERILE WATER/SALINE, 10 ML: HCPCS | Performed by: INTERNAL MEDICINE

## 2024-04-03 PROCEDURE — 2580000003 HC RX 258: Performed by: INTERNAL MEDICINE

## 2024-04-03 PROCEDURE — 6370000000 HC RX 637 (ALT 250 FOR IP): Performed by: INTERNAL MEDICINE

## 2024-04-03 RX ORDER — LIDOCAINE HYDROCHLORIDE 20 MG/ML
SOLUTION OROPHARYNGEAL PRN
Status: COMPLETED | OUTPATIENT
Start: 2024-04-03 | End: 2024-04-03

## 2024-04-03 RX ORDER — SODIUM CHLORIDE 0.9 % (FLUSH) 0.9 %
5-40 SYRINGE (ML) INJECTION PRN
Status: DISCONTINUED | OUTPATIENT
Start: 2024-04-03 | End: 2024-04-03 | Stop reason: HOSPADM

## 2024-04-03 RX ORDER — SODIUM CHLORIDE 9 MG/ML
INJECTION, SOLUTION INTRAVENOUS PRN
Status: DISCONTINUED | OUTPATIENT
Start: 2024-04-03 | End: 2024-04-03 | Stop reason: HOSPADM

## 2024-04-03 RX ORDER — FENTANYL CITRATE 50 UG/ML
INJECTION, SOLUTION INTRAMUSCULAR; INTRAVENOUS PRN
Status: COMPLETED | OUTPATIENT
Start: 2024-04-03 | End: 2024-04-03

## 2024-04-03 RX ORDER — MAGNESIUM SULFATE IN WATER 40 MG/ML
2000 INJECTION, SOLUTION INTRAVENOUS ONCE
Status: COMPLETED | OUTPATIENT
Start: 2024-04-03 | End: 2024-04-03

## 2024-04-03 RX ORDER — SODIUM CHLORIDE 0.9 % (FLUSH) 0.9 %
5-40 SYRINGE (ML) INJECTION EVERY 12 HOURS SCHEDULED
Status: DISCONTINUED | OUTPATIENT
Start: 2024-04-03 | End: 2024-04-03 | Stop reason: HOSPADM

## 2024-04-03 RX ORDER — MIDAZOLAM HYDROCHLORIDE 1 MG/ML
INJECTION INTRAMUSCULAR; INTRAVENOUS PRN
Status: COMPLETED | OUTPATIENT
Start: 2024-04-03 | End: 2024-04-03

## 2024-04-03 RX ADMIN — MIDAZOLAM 1 MG: 1 INJECTION INTRAMUSCULAR; INTRAVENOUS at 10:30

## 2024-04-03 RX ADMIN — FENTANYL CITRATE 25 MCG: 50 INJECTION, SOLUTION INTRAMUSCULAR; INTRAVENOUS at 10:15

## 2024-04-03 RX ADMIN — MAGNESIUM SULFATE HEPTAHYDRATE 2000 MG: 40 INJECTION, SOLUTION INTRAVENOUS at 09:58

## 2024-04-03 RX ADMIN — MIDAZOLAM 2 MG: 1 INJECTION INTRAMUSCULAR; INTRAVENOUS at 10:13

## 2024-04-03 RX ADMIN — LIDOCAINE HYDROCHLORIDE 15 ML: 20 SOLUTION ORAL at 10:08

## 2024-04-03 RX ADMIN — MIDAZOLAM 1 MG: 1 INJECTION INTRAMUSCULAR; INTRAVENOUS at 10:14

## 2024-04-03 RX ADMIN — FENTANYL CITRATE 50 MCG: 50 INJECTION, SOLUTION INTRAMUSCULAR; INTRAVENOUS at 10:13

## 2024-04-03 RX ADMIN — PERFLUTREN 1 ML: 6.52 INJECTION, SUSPENSION INTRAVENOUS at 10:26

## 2024-04-03 RX ADMIN — MIDAZOLAM 1 MG: 1 INJECTION INTRAMUSCULAR; INTRAVENOUS at 10:34

## 2024-04-03 RX ADMIN — FENTANYL CITRATE 25 MCG: 50 INJECTION, SOLUTION INTRAMUSCULAR; INTRAVENOUS at 10:34

## 2024-04-03 NOTE — PROGRESS NOTES
Patient received to CPRU room # 16  Ambulatory from [facility]. Patient scheduled for JULIANO CVN today with Dr Sanchez. Procedure reviewed & questions answered, voiced good understanding consent obtained & placed on chart. All medications and medical history reviewed. Will prep patient per orders. Patient & family updated on plan of care.      The patient has a fraility score of 3-MANAGING WELL, based on ambulation.

## 2024-04-03 NOTE — PROGRESS NOTES
JULIANO CVN complete with   Sedation start 1012  Sedation end 1035  5mg of Versed  100mcg of Fentanyl used for sedation  1 shock at 360j with conversion to NSR.  Numbed with viscous lidocaine at 1008

## 2024-04-03 NOTE — DISCHARGE INSTRUCTIONS
AFTER YOU TRANSESOPHAGEAL ECHOCARDIOGRAM    Be sure someone else drives you home. You may feel drowsy for several hours.    Do not eat or drink for at least two hours after your procedure. Your throat will be numb and there is a risk you might have difficulty swallowing for a while. Be careful when you do eat or drink for the first time especially with hot fluids since you could easily burn your throat.    Call your doctor if:    You are bleeding from your throat or mouth.  You have trouble breathing all of a sudden.  You have chest pain or any pain that spreads to your neck, jaw, or arms.  You have questions or concerns.  You have a fever greater than 101°F.      Special Instructions:    No driving for 24 hours.  Discharge Instructions for Cardioversion    Your healthcare provider performed a procedure called cardioversion. Your healthcare provider used a controlled electric shock or a medicine to briefly stop all electrical activity in your heart. This helped restore your heart’s normal rhythm. Here are some instructions to follow while you recover.    Home care  Because cardioversion typically requires sedation, you won't be able to drive home. You will need a ride. Wait at least 24 hours before driving a car or operating heavy machinery after receiving sedating medicines.    Don’t be alarmed if the skin on your chest is irritated or feels like it is sunburned. Your healthcare provider may prescribe a soothing lotion to relieve this discomfort. These minor symptoms will go away in a few days.    Ask your healthcare provider about medicines to keep your heart rhythm steady.    If you were prescribed medicine, take it as instructed by your healthcare provider. Don’t skip doses or take double doses. Cardioversion requires blood thinners for at least 4 weeks to prevent a delayed risk of stroke when treating atrial fibrillation or atrial flutter. Be sure you discuss which medicine you are taking to prevent stroke. Ask

## 2024-04-03 NOTE — PROGRESS NOTES
Discharge instructions given per orders, voiced good understanding of throat care, medications & follow up care. Denies any questions

## 2024-04-23 ENCOUNTER — OFFICE VISIT (OUTPATIENT)
Age: 64
End: 2024-04-23
Payer: COMMERCIAL

## 2024-04-23 VITALS
SYSTOLIC BLOOD PRESSURE: 138 MMHG | DIASTOLIC BLOOD PRESSURE: 86 MMHG | BODY MASS INDEX: 30.31 KG/M2 | WEIGHT: 223.8 LBS | HEIGHT: 72 IN | HEART RATE: 60 BPM

## 2024-04-23 DIAGNOSIS — I48.0 PAROXYSMAL ATRIAL FIBRILLATION (HCC): Primary | Chronic | ICD-10-CM

## 2024-04-23 DIAGNOSIS — I10 PRIMARY HYPERTENSION: Chronic | ICD-10-CM

## 2024-04-23 PROCEDURE — 99214 OFFICE O/P EST MOD 30 MIN: CPT | Performed by: INTERNAL MEDICINE

## 2024-04-23 PROCEDURE — 3075F SYST BP GE 130 - 139MM HG: CPT | Performed by: INTERNAL MEDICINE

## 2024-04-23 PROCEDURE — 3079F DIAST BP 80-89 MM HG: CPT | Performed by: INTERNAL MEDICINE

## 2024-04-23 ASSESSMENT — ENCOUNTER SYMPTOMS: SHORTNESS OF BREATH: 0

## 2024-05-01 ENCOUNTER — NURSE ONLY (OUTPATIENT)
Dept: FAMILY MEDICINE CLINIC | Facility: CLINIC | Age: 64
End: 2024-05-01

## 2024-05-01 DIAGNOSIS — E78.5 ELEVATED LIPIDS: ICD-10-CM

## 2024-05-01 DIAGNOSIS — E11.42 TYPE 2 DIABETES MELLITUS WITH DIABETIC POLYNEUROPATHY, WITHOUT LONG-TERM CURRENT USE OF INSULIN (HCC): ICD-10-CM

## 2024-05-01 DIAGNOSIS — E78.2 MIXED HYPERLIPIDEMIA: ICD-10-CM

## 2024-05-01 LAB
CHOLEST SERPL-MCNC: 188 MG/DL (ref 0–200)
EST. AVERAGE GLUCOSE BLD GHB EST-MCNC: 200 MG/DL
HBA1C MFR BLD: 8.6 % (ref 0–5.6)
HDLC SERPL-MCNC: 32 MG/DL (ref 40–60)
HDLC SERPL: 5.9 (ref 0–5)
LDLC SERPL CALC-MCNC: 91 MG/DL (ref 0–100)
TRIGL SERPL-MCNC: 327 MG/DL (ref 0–150)
VLDLC SERPL CALC-MCNC: 65 MG/DL (ref 6–23)

## 2024-05-02 ENCOUNTER — PATIENT MESSAGE (OUTPATIENT)
Dept: FAMILY MEDICINE CLINIC | Facility: CLINIC | Age: 64
End: 2024-05-02

## 2024-05-02 DIAGNOSIS — E11.65 CONTROLLED TYPE 2 DIABETES MELLITUS WITH HYPERGLYCEMIA, WITHOUT LONG-TERM CURRENT USE OF INSULIN (HCC): ICD-10-CM

## 2024-05-02 RX ORDER — MONTELUKAST SODIUM 10 MG/1
10 TABLET ORAL DAILY
Qty: 90 TABLET | Refills: 3 | Status: SHIPPED | OUTPATIENT
Start: 2024-05-02 | End: 2024-05-03 | Stop reason: SDUPTHER

## 2024-05-02 NOTE — TELEPHONE ENCOUNTER
From: Williams Dia Sr.  To: Dr. Yusef Cummings  Sent: 5/2/2024 3:03 AM EDT  Subject: Amryl    Need prescrition sent to [facility] in [place].thank you.

## 2024-05-03 ENCOUNTER — PATIENT MESSAGE (OUTPATIENT)
Dept: FAMILY MEDICINE CLINIC | Facility: CLINIC | Age: 64
End: 2024-05-03

## 2024-05-03 DIAGNOSIS — J30.1 SEASONAL ALLERGIC RHINITIS DUE TO POLLEN: Primary | ICD-10-CM

## 2024-05-03 RX ORDER — MONTELUKAST SODIUM 10 MG/1
10 TABLET ORAL DAILY
Qty: 90 TABLET | Refills: 3 | Status: SHIPPED | OUTPATIENT
Start: 2024-05-03

## 2024-05-03 NOTE — TELEPHONE ENCOUNTER
From: Williams Dia Sr.  To: Dr. Yusef Cummings  Sent: 5/3/2024 2:27 PM EDT  Subject: Amaryl    Still not filled. Ill take it as soon as it gets filled

## 2024-05-05 RX ORDER — GLIMEPIRIDE 2 MG/1
2 TABLET ORAL
Qty: 90 TABLET | Refills: 0 | Status: SHIPPED | OUTPATIENT
Start: 2024-05-05

## 2024-07-29 SDOH — ECONOMIC STABILITY: TRANSPORTATION INSECURITY
IN THE PAST 12 MONTHS, HAS LACK OF TRANSPORTATION KEPT YOU FROM MEETINGS, WORK, OR FROM GETTING THINGS NEEDED FOR DAILY LIVING?: NO

## 2024-07-29 SDOH — ECONOMIC STABILITY: FOOD INSECURITY: WITHIN THE PAST 12 MONTHS, THE FOOD YOU BOUGHT JUST DIDN'T LAST AND YOU DIDN'T HAVE MONEY TO GET MORE.: NEVER TRUE

## 2024-07-29 SDOH — ECONOMIC STABILITY: INCOME INSECURITY: HOW HARD IS IT FOR YOU TO PAY FOR THE VERY BASICS LIKE FOOD, HOUSING, MEDICAL CARE, AND HEATING?: SOMEWHAT HARD

## 2024-07-29 SDOH — ECONOMIC STABILITY: FOOD INSECURITY: WITHIN THE PAST 12 MONTHS, YOU WORRIED THAT YOUR FOOD WOULD RUN OUT BEFORE YOU GOT MONEY TO BUY MORE.: NEVER TRUE

## 2024-07-30 ENCOUNTER — PATIENT MESSAGE (OUTPATIENT)
Age: 64
End: 2024-07-30

## 2024-07-30 ENCOUNTER — TELEPHONE (OUTPATIENT)
Age: 64
End: 2024-07-30

## 2024-07-30 NOTE — TELEPHONE ENCOUNTER
----- Message from Didi Vanegas MA sent at 7/30/2024 11:12 AM EDT -----  Regarding: FW: Neck muscle strain  Contact: [phone]    ----- Message -----  From: Susan Akhtar MA  Sent: 7/30/2024  11:01 AM EDT  To: Didi Vanegas MA  Subject: FW: Neck muscle strain                             ----- Message -----  From: Williams Dia Sr.  Sent: 7/30/2024  11:00 AM EDT  To: [facility] Cardiology Clinical Staff  Subject: Neck muscle strain                               Im on eloquis , I reduced dosage to once a day until this heals . Advil Dual worked very well for pain. Alternating every 6 hours between Advil Dual and Tylenol.  Basically I have a sprained neck . Very painful but healing. What Im doing is working but not sure how safe it is.

## 2024-07-30 NOTE — TELEPHONE ENCOUNTER
Danny eastman, DO  You4 hours ago (12:57 PM)       Thank you for letting us know. I would recommend to continue the Eliquis twice daily since that is the dosing that has been shown to reduce risk for strokes. I would not recommend NSAIDs in general for pain, although if he is going to use an NSAID a topical version like Voltaren gel would be a safer option. He can take Tylenol without issue. Thanks.

## 2024-08-01 ENCOUNTER — OFFICE VISIT (OUTPATIENT)
Dept: FAMILY MEDICINE CLINIC | Facility: CLINIC | Age: 64
End: 2024-08-01
Payer: COMMERCIAL

## 2024-08-01 VITALS
HEIGHT: 72 IN | DIASTOLIC BLOOD PRESSURE: 88 MMHG | TEMPERATURE: 97.5 F | OXYGEN SATURATION: 98 % | BODY MASS INDEX: 30.07 KG/M2 | WEIGHT: 222 LBS | SYSTOLIC BLOOD PRESSURE: 160 MMHG | HEART RATE: 71 BPM

## 2024-08-01 DIAGNOSIS — I10 ESSENTIAL (PRIMARY) HYPERTENSION: ICD-10-CM

## 2024-08-01 DIAGNOSIS — J45.21 MILD INTERMITTENT ASTHMA WITH ACUTE EXACERBATION: Primary | ICD-10-CM

## 2024-08-01 DIAGNOSIS — E11.65 CONTROLLED TYPE 2 DIABETES MELLITUS WITH HYPERGLYCEMIA, WITHOUT LONG-TERM CURRENT USE OF INSULIN (HCC): ICD-10-CM

## 2024-08-01 LAB — HBA1C MFR BLD: 5.7 %

## 2024-08-01 PROCEDURE — 3052F HG A1C>EQUAL 8.0%<EQUAL 9.0%: CPT | Performed by: FAMILY MEDICINE

## 2024-08-01 PROCEDURE — 83036 HEMOGLOBIN GLYCOSYLATED A1C: CPT | Performed by: FAMILY MEDICINE

## 2024-08-01 PROCEDURE — 3079F DIAST BP 80-89 MM HG: CPT | Performed by: FAMILY MEDICINE

## 2024-08-01 PROCEDURE — 99213 OFFICE O/P EST LOW 20 MIN: CPT | Performed by: FAMILY MEDICINE

## 2024-08-01 PROCEDURE — 3077F SYST BP >= 140 MM HG: CPT | Performed by: FAMILY MEDICINE

## 2024-08-01 RX ORDER — GLIMEPIRIDE 2 MG/1
2 TABLET ORAL
Qty: 90 TABLET | Refills: 3 | Status: SHIPPED | OUTPATIENT
Start: 2024-08-01

## 2024-08-12 DIAGNOSIS — I48.19 PERSISTENT ATRIAL FIBRILLATION (HCC): ICD-10-CM

## 2024-08-12 RX ORDER — METOPROLOL SUCCINATE 50 MG/1
50 TABLET, EXTENDED RELEASE ORAL DAILY
Qty: 30 TABLET | Refills: 11 | Status: SHIPPED | OUTPATIENT
Start: 2024-08-12

## 2024-09-03 DIAGNOSIS — I48.19 PERSISTENT ATRIAL FIBRILLATION (HCC): Primary | ICD-10-CM

## 2024-10-23 ENCOUNTER — OFFICE VISIT (OUTPATIENT)
Age: 64
End: 2024-10-23
Payer: COMMERCIAL

## 2024-10-23 VITALS
HEIGHT: 72 IN | HEART RATE: 79 BPM | BODY MASS INDEX: 31.42 KG/M2 | DIASTOLIC BLOOD PRESSURE: 88 MMHG | WEIGHT: 232 LBS | SYSTOLIC BLOOD PRESSURE: 154 MMHG

## 2024-10-23 DIAGNOSIS — I10 PRIMARY HYPERTENSION: Chronic | ICD-10-CM

## 2024-10-23 DIAGNOSIS — I48.0 PAROXYSMAL ATRIAL FIBRILLATION (HCC): Primary | Chronic | ICD-10-CM

## 2024-10-23 PROCEDURE — 99214 OFFICE O/P EST MOD 30 MIN: CPT | Performed by: INTERNAL MEDICINE

## 2024-10-23 PROCEDURE — 3077F SYST BP >= 140 MM HG: CPT | Performed by: INTERNAL MEDICINE

## 2024-10-23 PROCEDURE — 3079F DIAST BP 80-89 MM HG: CPT | Performed by: INTERNAL MEDICINE

## 2024-10-23 RX ORDER — VALACYCLOVIR HYDROCHLORIDE 1 G/1
1000 TABLET, FILM COATED ORAL AS NEEDED
Qty: 30 TABLET | Refills: 11
Start: 2024-10-23

## 2024-10-23 ASSESSMENT — ENCOUNTER SYMPTOMS: SHORTNESS OF BREATH: 0

## 2024-10-30 ENCOUNTER — INITIAL CONSULT (OUTPATIENT)
Age: 64
End: 2024-10-30
Payer: COMMERCIAL

## 2024-10-30 ENCOUNTER — TELEPHONE (OUTPATIENT)
Age: 64
End: 2024-10-30

## 2024-10-30 VITALS
HEART RATE: 102 BPM | SYSTOLIC BLOOD PRESSURE: 152 MMHG | WEIGHT: 229 LBS | HEIGHT: 72 IN | BODY MASS INDEX: 31.02 KG/M2 | DIASTOLIC BLOOD PRESSURE: 88 MMHG

## 2024-10-30 DIAGNOSIS — I48.0 PAROXYSMAL ATRIAL FIBRILLATION (HCC): Primary | ICD-10-CM

## 2024-10-30 PROCEDURE — 93000 ELECTROCARDIOGRAM COMPLETE: CPT | Performed by: INTERNAL MEDICINE

## 2024-10-30 PROCEDURE — 99244 OFF/OP CNSLTJ NEW/EST MOD 40: CPT | Performed by: INTERNAL MEDICINE

## 2024-10-30 PROCEDURE — 3077F SYST BP >= 140 MM HG: CPT | Performed by: INTERNAL MEDICINE

## 2024-10-30 PROCEDURE — 3079F DIAST BP 80-89 MM HG: CPT | Performed by: INTERNAL MEDICINE

## 2024-10-30 NOTE — PROGRESS NOTES
[facility] CARDIOLOGY, [address], [address] [place] [address]  PHONE: [phone]  Williams Dia Sr.  1960    Chief Complant:    Chief Complaint   Patient presents with    Atrial Fibrillation    Consultation      Consultation is requested by [unfilled] for evaluation of Atrial Fibrillation and Consultation    Reason for Consultation: afib    History:  Williams Dia Sr. is a very pleasant 64 y.o. male with a past medical and cardiac history significant for HTN, HLD, DM, DELTA and persistent AF and presents for EP consultation for AF. Pt was noted to be in afib incidentally. He has some intermittent dizziness, no palpitations, chest pain, SOB. He has DCCV earlier this year, now back in AF.    Cardiac PMH: (Old records have been reviewed and summarized below)  JULIANO (4/3/24): EF 55-60%    Reviewed office note Dr. Sanchez 10/23/24    Past Medical History, Past Surgical History, Family history, Social History, and Medications were all reviewed with the patient today and updated as necessary.     Current Outpatient Medications   Medication Sig Dispense Refill    valACYclovir (VALTREX) 1 g tablet Take 1 tablet by mouth as needed (PRN) TAKE 1 TABLET BY MOUTH THREE TIMES A DAY 30 tablet 11    metoprolol succinate (TOPROL XL) 50 MG extended release tablet Take 1 tablet by mouth daily 30 tablet 11    glimepiride (AMARYL) 2 MG tablet Take 1 tablet by mouth daily 90 tablet 3    montelukast (SINGULAIR) 10 MG tablet Take 1 tablet by mouth daily 90 tablet 3    apixaban (ELIQUIS) 5 MG TABS tablet Take 1 tablet by mouth 2 times daily 60 tablet 11    olmesartan (BENICAR) 40 MG tablet Take 0.5 tablets by mouth daily 30 tablet 5    sildenafil (VIAGRA) 100 MG tablet Take 1 tablet by mouth as needed for Erectile Dysfunction 30 tablet 3     No current facility-administered medications for this visit.     No Known Allergies  [unfilled]    Past Medical History:   Diagnosis Date    Atrial fibrillation (HCC)

## 2024-11-06 ENCOUNTER — HOSPITAL ENCOUNTER (OUTPATIENT)
Dept: NON INVASIVE DIAGNOSTICS | Age: 64
Discharge: HOME | End: 2024-11-08
Attending: INTERNAL MEDICINE
Payer: COMMERCIAL

## 2024-11-06 DIAGNOSIS — I48.0 PAROXYSMAL ATRIAL FIBRILLATION (HCC): ICD-10-CM

## 2024-11-06 LAB
ANION GAP SERPL CALC-SCNC: 11 MMOL/L (ref 7–16)
BUN SERPL-MCNC: 17 MG/DL (ref 8–23)
CALCIUM SERPL-MCNC: 9.4 MG/DL (ref 8.8–10.2)
CHLORIDE SERPL-SCNC: 101 MMOL/L (ref 98–107)
CO2 SERPL-SCNC: 23 MMOL/L (ref 20–29)
CREAT SERPL-MCNC: 1.14 MG/DL (ref 0.8–1.3)
ECHO AO ASC DIAM: 3.6 CM
ECHO AO ROOT DIAM: 3.9 CM
ECHO AR MAX VEL PISA: 4.8 M/S
ECHO AV AREA PEAK VELOCITY: 2.3 CM2
ECHO AV AREA VTI: 2.1 CM2
ECHO AV MEAN GRADIENT: 5 MMHG
ECHO AV MEAN VELOCITY: 1 M/S
ECHO AV PEAK GRADIENT: 9 MMHG
ECHO AV PEAK VELOCITY: 1.5 M/S
ECHO AV REGURGITANT PHT: 639 MS
ECHO AV VELOCITY RATIO: 0.67
ECHO AV VTI: 28.4 CM
ECHO EST RA PRESSURE: 3 MMHG
ECHO IVC PROX: 2 CM
ECHO LA AREA 2C: 21.5 CM2
ECHO LA AREA 4C: 22 CM2
ECHO LA DIAMETER: 4.6 CM
ECHO LA MAJOR AXIS: 6.3 CM
ECHO LA MINOR AXIS: 5.8 CM
ECHO LA TO AORTIC ROOT RATIO: 1.18
ECHO LA VOL BP: 67 ML (ref 18–58)
ECHO LA VOL MOD A2C: 65 ML (ref 18–58)
ECHO LA VOL MOD A4C: 64 ML (ref 18–58)
ECHO LV E' LATERAL VELOCITY: 12.1 CM/S
ECHO LV E' SEPTAL VELOCITY: 11.3 CM/S
ECHO LV EDV A2C: 85 ML
ECHO LV EDV A4C: 94 ML
ECHO LV EJECTION FRACTION A2C: 51 %
ECHO LV EJECTION FRACTION A4C: 57 %
ECHO LV EJECTION FRACTION BIPLANE: 56 % (ref 55–100)
ECHO LV ESV A2C: 41 ML
ECHO LV ESV A4C: 41 ML
ECHO LV FRACTIONAL SHORTENING: 26 % (ref 28–44)
ECHO LV INTERNAL DIMENSION DIASTOLIC: 4.2 CM (ref 4.2–5.9)
ECHO LV INTERNAL DIMENSION SYSTOLIC: 3.1 CM
ECHO LV IVSD: 1.1 CM (ref 0.6–1)
ECHO LV MASS 2D: 167.4 G (ref 88–224)
ECHO LV POSTERIOR WALL DIASTOLIC: 1.2 CM (ref 0.6–1)
ECHO LV RELATIVE WALL THICKNESS RATIO: 0.57
ECHO LVOT AREA: 3.5 CM2
ECHO LVOT AV VTI INDEX: 0.61
ECHO LVOT DIAM: 2.1 CM
ECHO LVOT MEAN GRADIENT: 2 MMHG
ECHO LVOT PEAK GRADIENT: 4 MMHG
ECHO LVOT PEAK VELOCITY: 1 M/S
ECHO LVOT SV: 59.5 ML
ECHO LVOT VTI: 17.2 CM
ECHO MV E DECELERATION TIME (DT): 153 MS
ECHO MV E VELOCITY: 1.04 M/S
ECHO MV E/E' LATERAL: 8.6
ECHO MV E/E' RATIO (AVERAGED): 8.9
ECHO MV E/E' SEPTAL: 9.2
ECHO PV ACCELERATION TIME (AT): 98 MS
ECHO PV MAX VELOCITY: 0.9 M/S
ECHO PV PEAK GRADIENT: 3 MMHG
ECHO RV BASAL DIMENSION: 3.7 CM
ECHO RV FREE WALL PEAK S': 17.2 CM/S
ECHO RV INTERNAL DIMENSION: 3.7 CM
ECHO RV TAPSE: 1.8 CM (ref 1.7–?)
ERYTHROCYTE [DISTWIDTH] IN BLOOD BY AUTOMATED COUNT: 11.9 % (ref 11.9–14.6)
GLUCOSE SERPL-MCNC: 128 MG/DL (ref 70–99)
HCT VFR BLD AUTO: 40.1 % (ref 41.1–50.3)
HGB BLD-MCNC: 14.5 G/DL (ref 13.6–17.2)
MAGNESIUM SERPL-MCNC: 2.2 MG/DL (ref 1.8–2.4)
MCH RBC QN AUTO: 33.7 PG (ref 26.1–32.9)
MCHC RBC AUTO-ENTMCNC: 36.2 G/DL (ref 31.4–35)
MCV RBC AUTO: 93.3 FL (ref 82–102)
NRBC # BLD: 0 K/UL (ref 0–0.2)
PLATELET # BLD AUTO: 168 K/UL (ref 150–450)
PMV BLD AUTO: 10.1 FL (ref 9.4–12.3)
POTASSIUM SERPL-SCNC: 4.5 MMOL/L (ref 3.5–5.1)
RBC # BLD AUTO: 4.3 M/UL (ref 4.23–5.6)
SODIUM SERPL-SCNC: 134 MMOL/L (ref 136–145)
WBC # BLD AUTO: 6.4 K/UL (ref 4.3–11.1)

## 2024-11-06 PROCEDURE — 93306 TTE W/DOPPLER COMPLETE: CPT

## 2024-11-14 NOTE — PROGRESS NOTES
Patient pre-assessment complete for atrial fibrillation ablation scheduled for 24, arrival time 0600. Patient verified using . Patient instructed to bring a list of all home medications on the day of procedure. NPO status reinforced.  Patient instructed to HOLD Olmesartan, Glimpiride, Eliquis. Instructed they can take all other medications excluding vitamins & supplements. Patient verbalizes understanding of all instructions & denies any questions at this time.

## 2024-11-17 ENCOUNTER — ANESTHESIA EVENT (OUTPATIENT)
Dept: CARDIAC CATH/INVASIVE PROCEDURES | Age: 64
End: 2024-11-17
Payer: COMMERCIAL

## 2024-11-17 RX ORDER — ONDANSETRON 2 MG/ML
4 INJECTION INTRAMUSCULAR; INTRAVENOUS
Status: CANCELLED | OUTPATIENT
Start: 2024-11-17 | End: 2024-11-18

## 2024-11-17 RX ORDER — SODIUM CHLORIDE 0.9 % (FLUSH) 0.9 %
5-40 SYRINGE (ML) INJECTION EVERY 12 HOURS SCHEDULED
Status: CANCELLED | OUTPATIENT
Start: 2024-11-17

## 2024-11-17 RX ORDER — SODIUM CHLORIDE 9 MG/ML
INJECTION, SOLUTION INTRAVENOUS PRN
Status: CANCELLED | OUTPATIENT
Start: 2024-11-17

## 2024-11-17 RX ORDER — ACETAMINOPHEN 500 MG
500 TABLET ORAL ONCE
Status: CANCELLED | OUTPATIENT
Start: 2024-11-17 | End: 2024-11-17

## 2024-11-17 RX ORDER — SODIUM CHLORIDE 0.9 % (FLUSH) 0.9 %
5-40 SYRINGE (ML) INJECTION PRN
Status: CANCELLED | OUTPATIENT
Start: 2024-11-17

## 2024-11-17 RX ORDER — NALOXONE HYDROCHLORIDE 0.4 MG/ML
INJECTION, SOLUTION INTRAMUSCULAR; INTRAVENOUS; SUBCUTANEOUS PRN
Status: CANCELLED | OUTPATIENT
Start: 2024-11-17

## 2024-11-17 RX ORDER — SODIUM CHLORIDE, SODIUM LACTATE, POTASSIUM CHLORIDE, CALCIUM CHLORIDE 600; 310; 30; 20 MG/100ML; MG/100ML; MG/100ML; MG/100ML
INJECTION, SOLUTION INTRAVENOUS CONTINUOUS
Status: CANCELLED | OUTPATIENT
Start: 2024-11-17

## 2024-11-17 RX ORDER — LIDOCAINE HYDROCHLORIDE 10 MG/ML
1 INJECTION, SOLUTION INFILTRATION; PERINEURAL
Status: CANCELLED | OUTPATIENT
Start: 2024-11-17 | End: 2024-11-18

## 2024-11-17 RX ORDER — PROCHLORPERAZINE EDISYLATE 5 MG/ML
5 INJECTION INTRAMUSCULAR; INTRAVENOUS
Status: CANCELLED | OUTPATIENT
Start: 2024-11-17 | End: 2024-11-18

## 2024-11-17 RX ORDER — OXYCODONE HYDROCHLORIDE 5 MG/1
5 TABLET ORAL
Status: CANCELLED | OUTPATIENT
Start: 2024-11-17 | End: 2024-11-18

## 2024-11-18 ENCOUNTER — ANESTHESIA (OUTPATIENT)
Dept: CARDIAC CATH/INVASIVE PROCEDURES | Age: 64
End: 2024-11-18
Payer: COMMERCIAL

## 2024-11-18 ENCOUNTER — APPOINTMENT (OUTPATIENT)
Dept: CARDIAC CATH/INVASIVE PROCEDURES | Age: 64
End: 2024-11-18
Attending: INTERNAL MEDICINE
Payer: COMMERCIAL

## 2024-11-18 ENCOUNTER — HOSPITAL ENCOUNTER (OUTPATIENT)
Age: 64
Setting detail: OUTPATIENT SURGERY
Discharge: HOME | End: 2024-11-18
Attending: INTERNAL MEDICINE | Admitting: INTERNAL MEDICINE
Payer: COMMERCIAL

## 2024-11-18 VITALS
WEIGHT: 230 LBS | BODY MASS INDEX: 31.15 KG/M2 | TEMPERATURE: 97.3 F | HEART RATE: 90 BPM | HEIGHT: 72 IN | DIASTOLIC BLOOD PRESSURE: 89 MMHG | OXYGEN SATURATION: 94 % | SYSTOLIC BLOOD PRESSURE: 134 MMHG | RESPIRATION RATE: 11 BRPM

## 2024-11-18 DIAGNOSIS — I48.19 PERSISTENT ATRIAL FIBRILLATION (HCC): ICD-10-CM

## 2024-11-18 LAB
ACT BLD: 279 SECS (ref 70–128)
ACT BLD: 297 SECS (ref 70–128)
ANION GAP SERPL CALC-SCNC: 12 MMOL/L (ref 7–16)
BUN SERPL-MCNC: 23 MG/DL (ref 8–23)
CALCIUM SERPL-MCNC: 8.8 MG/DL (ref 8.8–10.2)
CHLORIDE SERPL-SCNC: 103 MMOL/L (ref 98–107)
CO2 SERPL-SCNC: 20 MMOL/L (ref 20–29)
CREAT SERPL-MCNC: 1.25 MG/DL (ref 0.8–1.3)
ECHO BSA: 2.3 M2
ECHO BSA: 2.3 M2
EKG DIAGNOSIS: NORMAL
EKG Q-T INTERVAL: 362 MS
EKG QRS DURATION: 88 MS
EKG QTC CALCULATION (BAZETT): 425 MS
EKG R AXIS: 1 DEGREES
EKG T AXIS: 9 DEGREES
EKG VENTRICULAR RATE: 83 BPM
ERYTHROCYTE [DISTWIDTH] IN BLOOD BY AUTOMATED COUNT: 11.8 % (ref 11.9–14.6)
GLUCOSE BLD STRIP.AUTO-MCNC: 162 MG/DL (ref 65–100)
GLUCOSE SERPL-MCNC: 160 MG/DL (ref 70–99)
HCT VFR BLD AUTO: 37.3 % (ref 41.1–50.3)
HGB BLD-MCNC: 13.5 G/DL (ref 13.6–17.2)
MAGNESIUM SERPL-MCNC: 2 MG/DL (ref 1.8–2.4)
MCH RBC QN AUTO: 34.4 PG (ref 26.1–32.9)
MCHC RBC AUTO-ENTMCNC: 36.2 G/DL (ref 31.4–35)
MCV RBC AUTO: 94.9 FL (ref 82–102)
NRBC # BLD: 0 K/UL (ref 0–0.2)
PLATELET # BLD AUTO: 156 K/UL (ref 150–450)
PMV BLD AUTO: 9.9 FL (ref 9.4–12.3)
POTASSIUM SERPL-SCNC: 4.8 MMOL/L (ref 3.5–5.1)
RBC # BLD AUTO: 3.93 M/UL (ref 4.23–5.6)
SERVICE CMNT-IMP: ABNORMAL
SODIUM SERPL-SCNC: 135 MMOL/L (ref 136–145)
WBC # BLD AUTO: 5.8 K/UL (ref 4.3–11.1)

## 2024-11-18 PROCEDURE — 93623 PRGRMD STIMJ&PACG IV RX NFS: CPT | Performed by: INTERNAL MEDICINE

## 2024-11-18 PROCEDURE — 3700000001 HC ADD 15 MINUTES (ANESTHESIA): Performed by: INTERNAL MEDICINE

## 2024-11-18 PROCEDURE — C1760 CLOSURE DEV, VASC: HCPCS | Performed by: INTERNAL MEDICINE

## 2024-11-18 PROCEDURE — 2720000010 HC SURG SUPPLY STERILE: Performed by: INTERNAL MEDICINE

## 2024-11-18 PROCEDURE — C1894 INTRO/SHEATH, NON-LASER: HCPCS | Performed by: INTERNAL MEDICINE

## 2024-11-18 PROCEDURE — 93005 ELECTROCARDIOGRAM TRACING: CPT | Performed by: INTERNAL MEDICINE

## 2024-11-18 PROCEDURE — 93656 COMPRE EP EVAL ABLTJ ATR FIB: CPT | Performed by: INTERNAL MEDICINE

## 2024-11-18 PROCEDURE — 93655 ICAR CATH ABLTJ DSCRT ARRHYT: CPT | Performed by: INTERNAL MEDICINE

## 2024-11-18 PROCEDURE — 2709999900 HC NON-CHARGEABLE SUPPLY: Performed by: INTERNAL MEDICINE

## 2024-11-18 PROCEDURE — 6360000002 HC RX W HCPCS

## 2024-11-18 PROCEDURE — 93325 DOPPLER ECHO COLOR FLOW MAPG: CPT

## 2024-11-18 PROCEDURE — 6360000002 HC RX W HCPCS: Performed by: INTERNAL MEDICINE

## 2024-11-18 PROCEDURE — 2580000003 HC RX 258

## 2024-11-18 PROCEDURE — 82962 GLUCOSE BLOOD TEST: CPT

## 2024-11-18 PROCEDURE — C1732 CATH, EP, DIAG/ABL, 3D/VECT: HCPCS | Performed by: INTERNAL MEDICINE

## 2024-11-18 PROCEDURE — 83735 ASSAY OF MAGNESIUM: CPT

## 2024-11-18 PROCEDURE — 85027 COMPLETE CBC AUTOMATED: CPT

## 2024-11-18 PROCEDURE — C1759 CATH, INTRA ECHOCARDIOGRAPHY: HCPCS | Performed by: INTERNAL MEDICINE

## 2024-11-18 PROCEDURE — 7100000000 HC PACU RECOVERY - FIRST 15 MIN: Performed by: INTERNAL MEDICINE

## 2024-11-18 PROCEDURE — 93622 COMP EP EVAL L VENTR PAC&REC: CPT | Performed by: INTERNAL MEDICINE

## 2024-11-18 PROCEDURE — C1893 INTRO/SHEATH, FIXED,NON-PEEL: HCPCS | Performed by: INTERNAL MEDICINE

## 2024-11-18 PROCEDURE — 2500000003 HC RX 250 WO HCPCS: Performed by: INTERNAL MEDICINE

## 2024-11-18 PROCEDURE — C1730 CATH, EP, 19 OR FEW ELECT: HCPCS | Performed by: INTERNAL MEDICINE

## 2024-11-18 PROCEDURE — 85347 COAGULATION TIME ACTIVATED: CPT

## 2024-11-18 PROCEDURE — 80048 BASIC METABOLIC PNL TOTAL CA: CPT

## 2024-11-18 PROCEDURE — 2500000003 HC RX 250 WO HCPCS

## 2024-11-18 PROCEDURE — 93657 TX L/R ATRIAL FIB ADDL: CPT | Performed by: INTERNAL MEDICINE

## 2024-11-18 PROCEDURE — 3700000000 HC ANESTHESIA ATTENDED CARE: Performed by: INTERNAL MEDICINE

## 2024-11-18 PROCEDURE — 7100000001 HC PACU RECOVERY - ADDTL 15 MIN: Performed by: INTERNAL MEDICINE

## 2024-11-18 RX ORDER — HEPARIN SODIUM AND DEXTROSE 5000; 5 [USP'U]/100ML; G/100ML
INJECTION INTRAVENOUS
Status: DISCONTINUED | OUTPATIENT
Start: 2024-11-18 | End: 2024-11-18 | Stop reason: SDUPTHER

## 2024-11-18 RX ORDER — PROPOFOL 10 MG/ML
INJECTION, EMULSION INTRAVENOUS
Status: DISCONTINUED | OUTPATIENT
Start: 2024-11-18 | End: 2024-11-18 | Stop reason: SDUPTHER

## 2024-11-18 RX ORDER — SUCRALFATE 1 G/1
1 TABLET ORAL 4 TIMES DAILY
Qty: 120 TABLET | Refills: 0 | Status: SHIPPED | OUTPATIENT
Start: 2024-11-18

## 2024-11-18 RX ORDER — HEPARIN SODIUM 1000 [USP'U]/ML
INJECTION, SOLUTION INTRAVENOUS; SUBCUTANEOUS
Status: DISCONTINUED | OUTPATIENT
Start: 2024-11-18 | End: 2024-11-18 | Stop reason: SDUPTHER

## 2024-11-18 RX ORDER — ONDANSETRON 2 MG/ML
INJECTION INTRAMUSCULAR; INTRAVENOUS
Status: DISCONTINUED | OUTPATIENT
Start: 2024-11-18 | End: 2024-11-18 | Stop reason: SDUPTHER

## 2024-11-18 RX ORDER — DEXAMETHASONE SODIUM PHOSPHATE 10 MG/ML
INJECTION INTRAMUSCULAR; INTRAVENOUS
Status: DISCONTINUED | OUTPATIENT
Start: 2024-11-18 | End: 2024-11-18 | Stop reason: SDUPTHER

## 2024-11-18 RX ORDER — ADENOSINE 3 MG/ML
INJECTION, SOLUTION INTRAVENOUS PRN
Status: DISCONTINUED | OUTPATIENT
Start: 2024-11-18 | End: 2024-11-18 | Stop reason: HOSPADM

## 2024-11-18 RX ORDER — COLCHICINE 0.6 MG/1
0.6 TABLET ORAL DAILY
Qty: 30 TABLET | Refills: 0 | Status: SHIPPED | OUTPATIENT
Start: 2024-11-18

## 2024-11-18 RX ORDER — LIDOCAINE HYDROCHLORIDE AND EPINEPHRINE 10; 10 MG/ML; UG/ML
INJECTION, SOLUTION INFILTRATION; PERINEURAL PRN
Status: DISCONTINUED | OUTPATIENT
Start: 2024-11-18 | End: 2024-11-18 | Stop reason: HOSPADM

## 2024-11-18 RX ORDER — ROCURONIUM BROMIDE 10 MG/ML
INJECTION, SOLUTION INTRAVENOUS
Status: DISCONTINUED | OUTPATIENT
Start: 2024-11-18 | End: 2024-11-18 | Stop reason: SDUPTHER

## 2024-11-18 RX ORDER — PANTOPRAZOLE SODIUM 40 MG/1
40 TABLET, DELAYED RELEASE ORAL
Qty: 60 TABLET | Refills: 0 | Status: SHIPPED | OUTPATIENT
Start: 2024-11-18

## 2024-11-18 RX ORDER — LIDOCAINE HYDROCHLORIDE 20 MG/ML
INJECTION, SOLUTION EPIDURAL; INFILTRATION; INTRACAUDAL; PERINEURAL
Status: DISCONTINUED | OUTPATIENT
Start: 2024-11-18 | End: 2024-11-18 | Stop reason: SDUPTHER

## 2024-11-18 RX ORDER — SODIUM CHLORIDE 0.9 % (FLUSH) 0.9 %
SYRINGE (ML) INJECTION
Status: DISCONTINUED | OUTPATIENT
Start: 2024-11-18 | End: 2024-11-18 | Stop reason: SDUPTHER

## 2024-11-18 RX ORDER — PROTAMINE SULFATE 10 MG/ML
INJECTION, SOLUTION INTRAVENOUS
Status: DISCONTINUED | OUTPATIENT
Start: 2024-11-18 | End: 2024-11-18 | Stop reason: SDUPTHER

## 2024-11-18 RX ORDER — FENTANYL CITRATE 50 UG/ML
INJECTION, SOLUTION INTRAMUSCULAR; INTRAVENOUS
Status: DISCONTINUED | OUTPATIENT
Start: 2024-11-18 | End: 2024-11-18 | Stop reason: SDUPTHER

## 2024-11-18 RX ADMIN — PHENYLEPHRINE HYDROCHLORIDE 150 MCG: 10 INJECTION INTRAVENOUS at 07:31

## 2024-11-18 RX ADMIN — FENTANYL CITRATE 100 MCG: 50 INJECTION, SOLUTION INTRAMUSCULAR; INTRAVENOUS at 07:14

## 2024-11-18 RX ADMIN — SUGAMMADEX 200 MG: 100 INJECTION, SOLUTION INTRAVENOUS at 08:32

## 2024-11-18 RX ADMIN — PHENYLEPHRINE HYDROCHLORIDE 100 MCG: 10 INJECTION INTRAVENOUS at 07:43

## 2024-11-18 RX ADMIN — PHENYLEPHRINE HYDROCHLORIDE 100 MCG: 10 INJECTION INTRAVENOUS at 07:45

## 2024-11-18 RX ADMIN — SODIUM CHLORIDE, PRESERVATIVE FREE 40 ML: 5 INJECTION INTRAVENOUS at 07:57

## 2024-11-18 RX ADMIN — PROTAMINE SULFATE 100 MG: 10 INJECTION, SOLUTION INTRAVENOUS at 08:32

## 2024-11-18 RX ADMIN — PROPOFOL 200 MG: 10 INJECTION, EMULSION INTRAVENOUS at 07:14

## 2024-11-18 RX ADMIN — ROCURONIUM BROMIDE 20 MG: 10 INJECTION, SOLUTION INTRAVENOUS at 07:53

## 2024-11-18 RX ADMIN — HEPARIN SODIUM 9000 UNITS: 1000 INJECTION INTRAVENOUS; SUBCUTANEOUS at 07:47

## 2024-11-18 RX ADMIN — LIDOCAINE HYDROCHLORIDE 80 MG: 20 INJECTION, SOLUTION EPIDURAL; INFILTRATION; INTRACAUDAL; PERINEURAL at 07:14

## 2024-11-18 RX ADMIN — ROCURONIUM BROMIDE 10 MG: 10 INJECTION, SOLUTION INTRAVENOUS at 08:16

## 2024-11-18 RX ADMIN — HEPARIN SODIUM AND DEXTROSE 40 UNITS/KG/HR: 5000; 5 INJECTION INTRAVENOUS at 07:47

## 2024-11-18 RX ADMIN — HEPARIN SODIUM 2000 UNITS: 1000 INJECTION INTRAVENOUS; SUBCUTANEOUS at 08:10

## 2024-11-18 RX ADMIN — PHENYLEPHRINE HYDROCHLORIDE 150 MCG: 10 INJECTION INTRAVENOUS at 07:57

## 2024-11-18 RX ADMIN — DEXAMETHASONE SODIUM PHOSPHATE 4 MG: 10 INJECTION INTRAMUSCULAR; INTRAVENOUS at 07:25

## 2024-11-18 RX ADMIN — ONDANSETRON 4 MG: 2 INJECTION INTRAMUSCULAR; INTRAVENOUS at 07:25

## 2024-11-18 RX ADMIN — HEPARIN SODIUM 9000 UNITS: 1000 INJECTION INTRAVENOUS; SUBCUTANEOUS at 07:40

## 2024-11-18 RX ADMIN — PHENYLEPHRINE HYDROCHLORIDE 150 MCG: 10 INJECTION INTRAVENOUS at 08:18

## 2024-11-18 RX ADMIN — ROCURONIUM BROMIDE 40 MG: 10 INJECTION, SOLUTION INTRAVENOUS at 07:14

## 2024-11-18 ASSESSMENT — PAIN - FUNCTIONAL ASSESSMENT
PAIN_FUNCTIONAL_ASSESSMENT: NONE - DENIES PAIN

## 2024-11-18 NOTE — PROCEDURES
Pre-Electrophysiology Diagnosis  1. Persistent Atrial fibrillation     Procedure Performed  1. EPS afib ablation/pulmonary vein isolation  2. Left atrial pacing recording from the coronary sinus.  3. 3-D Electroanatomical mapping  4. Intracardiac echo  5. Ablation of second arrhythmia  6. LV pacing and recording  7. Transesophageal echo  8. Drug infusion  9. Ablation of an additional linear lines x 1  10. Cardioversion     Anesthesia: General     Estimated Blood Loss: Less than 10 mL     Procedure in Detail:  The patient was brought to the electrophysiology lab in the fasting state. The patient was intubated by anesthesiology and an esophageal temperature probe inserted and advanced to a location directly posterior to the LA at the level of the pulmonary veins.  The esophageal temperature probe was repositioned throughout the case to a location as close the the ablation catheter as possible. If the esophageal temperature increased 0.5 degrees Celsius ablation was stopped until the temperature returned to baseline.  A tranesophageal echocardiogram was performed directly prior to the procedure and was negative for a CARLOS ALBERTO thrombus (see full report in chart).  A Ref-Star CARTO patch was placed, the patient was then prepped and draped in sterile fashion. Venous access was obtained under ultrasound guidance x4 using modified Seldinger technique, with placement of one 8Fr short sidearm sheath and two 8.5 Fr SL-O 63cm long braided sheaths in the right femoral vein and placement of a 10Fr sheath into the left femoral vein.  The patient presented to the EP lab in atrial fibrillation and underwent DCCV with pads across the anterior and posterior chest.  An intra-cardiac echo probe was prepped, and then inserted into an 10 Fr short sheath and used to localize the fossa ovalis and create LA and pulmonary vein anatomy utilizing CARTO Sound technology.  A Angel Alertster Octaray catheter was then inserted into an 8.5 SLO Fr sheath

## 2024-11-18 NOTE — ANESTHESIA PROCEDURE NOTES
Airway  Date/Time: 11/18/2024 7:17 AM  Urgency: elective    Airway not difficult    General Information and Staff    Patient location during procedure: OR  Resident/CRNA: Ronel Spaulding APRN - CRNA  Performed: resident/CRNA   Performed by: Ronel Spaulding APRN - CRNA  Authorized by: Bryan Ahmadi MD      Indications and Patient Condition  Indications for airway management: anesthesia  Spontaneous Ventilation: absent  Sedation level: deep  Preoxygenated: yes  Patient position: sniffing  MILS not maintained throughout  Mask difficulty assessment: vent by bag mask    Final Airway Details  Final airway type: endotracheal airway      Successful airway: ETT  Cuffed: yes   Successful intubation technique: direct laryngoscopy  Facilitating devices/methods: intubating stylet  Endotracheal tube insertion site: oral  Blade: Theodore  Blade size: #4  ETT size (mm): 8.0  Cormack-Lehane Classification: grade I - full view of glottis  Placement verified by: chest auscultation and capnometry   Measured from: lips  ETT to lips (cm): 23  Number of attempts at approach: 1  Ventilation between attempts: bag mask

## 2024-11-18 NOTE — PROGRESS NOTES
Patient received to CPRU room # 9  Ambulatory from [facility]. Patient scheduled for Afib ablation today with Dr Garay. Procedure reviewed & questions answered, voiced good understanding consent obtained & placed on chart. All medications and medical history reviewed. Will prep patient per orders. Patient & family updated on plan of care.      The patient has a fraility score of 3-MANAGING WELL, based on ambulation.

## 2024-11-18 NOTE — PROGRESS NOTES
Report received from PACU RN. Procedural findings communicated. Intra procedural  medication administration reviewed. Progression of care discussed.     Patient received into CPRU [place] 2 post sheath removal.     Access site without bleeding or swelling yes    Dressing dry and intact yes    Patient instructed to limit movement to bilateral lower extremity    Routine post procedural vital signs and site assessment initiated yes

## 2024-11-18 NOTE — DISCHARGE INSTRUCTIONS
Atrial Fib Ablation Discharge Instructions    1 - Check the puncture site frequently for swelling or bleeding. If you see any bleeding, lie down and apply pressure over the area with a clean town or washcloth. Notify your doctor for any redness, swelling, drainage or oozing from the puncture site. Notify your doctor for any fever or chills.    2 - If the leg with the puncture becomes cold, numb or painful, call [facility] Cardiology at  [phone].    3 - Activity should be limited for the next 48 hours. Climb stairs as little as possible and avoid any stooping, bending or strenuous activity for 48 hours. No heavy lifting (anything over 10 pounds) for three days.    4 - Do not drive for the first 24 hours post ablation.    5 - You may resume your usual diet. Drink more fluids than usual.    6 - Have a responsible person drive you home and stay with you for at least 24 hours after your ablation.    7 -You may remove the bandage from your Right & Left Groin in 24 hours. You may shower in 24 hours. No tub baths, hot tubs or swimming for one week. Do not place any lotions, creams, powders, ointments over the puncture site for one week. You may place a clean band-aid over the puncture site each day for 5 days. Change this daily.    8 - Continue medicines for now including your blood thinner Eliquis, and your rhythm medicine Toprol XL. These medicines should be continued until EP follow up.     9 - You will need to start Carafate, Colchicine and Protonix for one month. This is to protect your esophagus from a possible injury during the ablation procedure.     10 - If chest pain occurs (especially when taking a deep breath), it is likely due to pericarditis or inflammation around your heart sac which is related to the ablation procedure. It usually responds to ibuprofen at 400-600 mg every 6-8 hours as needed. If feels severe or unrelieved, please call office to discuss symptoms with

## 2024-11-18 NOTE — ANESTHESIA POSTPROCEDURE EVALUATION
Department of Anesthesiology  Postprocedure Note    Patient: Williams Dia  MRN: [id number]  YOB: 1960  Date of evaluation: 11/18/2024    Procedure Summary       Date: 11/18/24 Room / Location: [facility] EP 2 ALL EVENTS / [facility] CARDIAC CATH LAB    Anesthesia Start: 0651 Anesthesia Stop: 0853    Procedure: Ablation A-fib w complete ep study Diagnosis:       Persistent atrial fibrillation (HCC)      (Persistent atrial fibrillation (HCC) [I48.19])    Providers: Dean Garay MD Responsible Provider: Bryan Ahmadi MD    Anesthesia Type: general ASA Status: 3            Anesthesia Type: No value filed.    Baudilio Phase I: Baudilio Score: 10    Baudilio Phase II:      Anesthesia Post Evaluation    Patient location during evaluation: PACU  Patient participation: complete - patient participated  Level of consciousness: awake and alert  Pain score: 2  Airway patency: patent  Nausea & Vomiting: no nausea  Cardiovascular status: blood pressure returned to baseline and hemodynamically stable  Respiratory status: acceptable  Hydration status: euvolemic  Multimodal analgesia pain management approach  Pain management: adequate and satisfactory to patient    There were no known notable events for this encounter.

## 2024-11-18 NOTE — PERIOP NOTE
Report received at bedside, RN stated patient had a small hematoma that was expressed in EP cath lab and is now intact. See post cath flow sheets, no hematoma noted at hand off, patient A&O x4 understands risks.

## 2024-11-18 NOTE — PROGRESS NOTES
Patient up to bedside, vital signs and site stable. Patient ambulated to bathroom without difficulty. Patient voided without difficulty. Vascular site stable.  Discharge instructions and home medications reviewed with patient. Time allowed for questions and answers.Site stable after ambulation. Peripheral IV sites dc'd without difficulty with tips intact.

## 2024-11-18 NOTE — ANESTHESIA PRE PROCEDURE
kg/m².    CBC:   Lab Results   Component Value Date/Time    WBC 6.4 11/06/2024 11:53 AM    RBC 4.30 11/06/2024 11:53 AM    HGB 14.5 11/06/2024 11:53 AM    HCT 40.1 11/06/2024 11:53 AM    MCV 93.3 11/06/2024 11:53 AM    RDW 11.9 11/06/2024 11:53 AM     11/06/2024 11:53 AM       CMP:   Lab Results   Component Value Date/Time     11/06/2024 11:53 AM    K 4.5 11/06/2024 11:53 AM     11/06/2024 11:53 AM    CO2 23 11/06/2024 11:53 AM    BUN 17 11/06/2024 11:53 AM    CREATININE 1.14 11/06/2024 11:53 AM    GFRAA 80 01/31/2022 08:33 AM    AGRATIO 1.8 04/05/2022 08:22 AM    LABGLOM 72 11/06/2024 11:53 AM    LABGLOM 62 04/03/2024 08:59 AM    LABGLOM 73 04/05/2022 08:22 AM    GLUCOSE 128 11/06/2024 11:53 AM    CALCIUM 9.4 11/06/2024 11:53 AM    BILITOT <0.1 01/24/2024 09:28 AM    ALKPHOS 117 01/24/2024 09:28 AM    ALKPHOS 93 04/05/2022 08:22 AM    AST 17 01/24/2024 09:28 AM    ALT 34 01/24/2024 09:28 AM       POC Tests: No results for input(s): \"POCGLU\", \"POCNA\", \"POCK\", \"POCCL\", \"POCBUN\", \"POCHEMO\", \"POCHCT\" in the last 72 hours.    Coags: No results found for: \"PROTIME\", \"INR\", \"APTT\"    HCG (If Applicable): No results found for: \"PREGTESTUR\", \"PREGSERUM\", \"HCG\", \"HCGQUANT\"     ABGs: No results found for: \"PHART\", \"PO2ART\", \"ZFB6KFE\", \"JZP9EVD\", \"BEART\", \"T1KMBGTW\"     Type & Screen (If Applicable):  No results found for: \"ABORH\", \"LABANTI\"    Drug/Infectious Status (If Applicable):  Lab Results   Component Value Date/Time    HEPCAB <0.1 09/14/2020 08:06 AM       COVID-19 Screening (If Applicable): No results found for: \"COVID19\"        Anesthesia Evaluation  Patient summary reviewed and Nursing notes reviewed  : History of anesthetic complications: \"Delayed awakening\".  Airway: Mallampati: III  TM distance: >3 FB   Neck ROM: full  Mouth opening: > = 3 FB   Dental:    (+) caps      Pulmonary: breath sounds clear to auscultation  (+)     sleep apnea: on CPAP,       asthma:

## 2025-01-02 NOTE — PROGRESS NOTES
Encounters:   01/03/25 (!) 146/78   11/18/24 134/89   10/30/24 (!) 152/88      Gen: well appearing, well developed, NAD  Eyes: Pupils equal, EOMI  CV: RRR, no M/R/G, normal JVD, normal distal pulses, no JAY  Pulm: CTAB, no accessory muscle uses, no wheezes, crackles  GI: soft, NT, ND  Neuro: Alert and oriented    Medical problems and test results were reviewed with the patient today.     No results found for any visits on 01/03/25.    EKG:  (EKG has been independently visualized by me with interpretation below)  Sinus Rhythm, rate 66   -RSR(V1) -nondiagnostic.    Williams was seen today for atrial fibrillation.    Diagnoses and all orders for this visit:    Persistent atrial fibrillation (HCC)    Primary hypertension    Other orders  -     EKG 12 Lead      ASSESSMENT and PLAN  1. Persistent atrial fibrillation failing metoprolol, failing DCCV:   - s/p afib ablation 11/18/2024   - in sinus rhythm today, no reoccurrence of afib per Watch or symptoms  - 3 month ECHO and follow up scheduled  - Protonix, Carafate and colchicine completed     2. CVA:   - continue Eliquis 5 mg Q12H, no bleeding issues   [id number] Score: 2  Disclaimer: Risk Score calculation is dependent on accuracy of patient problem list and past encounter diagnosis.    3. HTN  - stable, continue olmesartan 40 mg QD, metoprolol succinate 50 mg QD    Patient has been instructed and agrees to call our office with any issues or other concerns related to their cardiac condition(s) and/or complaint(s).    SVETA Izaguirre    01/03/25  8:48 AM

## 2025-01-03 ENCOUNTER — OFFICE VISIT (OUTPATIENT)
Age: 65
End: 2025-01-03
Payer: COMMERCIAL

## 2025-01-03 VITALS
HEIGHT: 72 IN | DIASTOLIC BLOOD PRESSURE: 78 MMHG | SYSTOLIC BLOOD PRESSURE: 146 MMHG | WEIGHT: 239 LBS | HEART RATE: 66 BPM | BODY MASS INDEX: 32.37 KG/M2

## 2025-01-03 DIAGNOSIS — I48.19 PERSISTENT ATRIAL FIBRILLATION (HCC): Primary | ICD-10-CM

## 2025-01-03 DIAGNOSIS — I10 PRIMARY HYPERTENSION: ICD-10-CM

## 2025-01-03 PROCEDURE — 93000 ELECTROCARDIOGRAM COMPLETE: CPT | Performed by: PHYSICIAN ASSISTANT

## 2025-01-03 PROCEDURE — 99214 OFFICE O/P EST MOD 30 MIN: CPT | Performed by: PHYSICIAN ASSISTANT

## 2025-01-03 PROCEDURE — 3078F DIAST BP <80 MM HG: CPT | Performed by: PHYSICIAN ASSISTANT

## 2025-01-03 PROCEDURE — 3077F SYST BP >= 140 MM HG: CPT | Performed by: PHYSICIAN ASSISTANT

## 2025-01-22 DIAGNOSIS — I48.91 UNSPECIFIED ATRIAL FIBRILLATION (HCC): Primary | ICD-10-CM

## 2025-02-10 RX ORDER — OLMESARTAN MEDOXOMIL 40 MG/1
20 TABLET ORAL DAILY
Qty: 30 TABLET | Refills: 5 | Status: SHIPPED | OUTPATIENT
Start: 2025-02-10

## 2025-02-10 NOTE — TELEPHONE ENCOUNTER
Requested Prescriptions     Pending Prescriptions Disp Refills    olmesartan (BENICAR) 40 MG tablet 30 tablet 5     Sig: Take 0.5 tablets by mouth daily

## 2025-02-10 NOTE — TELEPHONE ENCOUNTER
MEDICATION REFILL REQUEST      Name of Medication:  Olmesartan   Dose:  40 mg  Frequency:  daily   Quantity:    Days' supply:  90 day       Pharmacy Name/Location:  [facility] in [place]

## 2025-02-19 ENCOUNTER — OFFICE VISIT (OUTPATIENT)
Age: 65
End: 2025-02-19
Payer: COMMERCIAL

## 2025-02-19 VITALS
SYSTOLIC BLOOD PRESSURE: 142 MMHG | WEIGHT: 237.5 LBS | BODY MASS INDEX: 32.17 KG/M2 | HEART RATE: 65 BPM | HEIGHT: 72 IN | DIASTOLIC BLOOD PRESSURE: 80 MMHG

## 2025-02-19 DIAGNOSIS — I48.0 PAROXYSMAL ATRIAL FIBRILLATION (HCC): Primary | ICD-10-CM

## 2025-02-19 PROCEDURE — 3079F DIAST BP 80-89 MM HG: CPT | Performed by: INTERNAL MEDICINE

## 2025-02-19 PROCEDURE — 3077F SYST BP >= 140 MM HG: CPT | Performed by: INTERNAL MEDICINE

## 2025-02-19 PROCEDURE — 93000 ELECTROCARDIOGRAM COMPLETE: CPT | Performed by: INTERNAL MEDICINE

## 2025-02-19 PROCEDURE — 99214 OFFICE O/P EST MOD 30 MIN: CPT | Performed by: INTERNAL MEDICINE

## 2025-02-19 RX ORDER — OLMESARTAN MEDOXOMIL 40 MG/1
40 TABLET ORAL DAILY
Qty: 30 TABLET | Refills: 5 | Status: SHIPPED | OUTPATIENT
Start: 2025-02-19

## 2025-02-19 NOTE — PROGRESS NOTES
[facility]  [address] [place] [address], [address]  [place] [address]  PHONE: [phone]  Williams Dia  1960    Chief Complant:    Chief Complaint   Patient presents with    Atrial Fibrillation     3 mos post PVI      Consultation is requested by [unfilled] for evaluation of Atrial Fibrillation (3 mos post PVI)    Reason for Consultation: afib    History:  Williams Dia is a very pleasant 64 y.o. male with a past medical and cardiac history significant for HTN, HLD, DM, DELTA and persistent AF and presents s/p afib ablation. He is doing well, no cardiac complaints, no AF.    Cardiac PMH: (Old records have been reviewed and summarized below)  JULIANO (4/3/24): EF 55-60%    Reviewed office note John BANGURA 1/3/25    Past Medical History, Past Surgical History, Family history, Social History, and Medications were all reviewed with the patient today and updated as necessary.     Current Outpatient Medications   Medication Sig Dispense Refill    valACYclovir (VALTREX) 1 g tablet Take 1 tablet by mouth as needed (PRN) TAKE 1 TABLET BY MOUTH THREE TIMES A DAY 30 tablet 11    metoprolol succinate (TOPROL XL) 50 MG extended release tablet Take 1 tablet by mouth daily 30 tablet 11    glimepiride (AMARYL) 2 MG tablet Take 1 tablet by mouth daily 90 tablet 3    montelukast (SINGULAIR) 10 MG tablet Take 1 tablet by mouth daily 90 tablet 3    apixaban (ELIQUIS) 5 MG TABS tablet Take 1 tablet by mouth 2 times daily 60 tablet 11    sildenafil (VIAGRA) 100 MG tablet Take 1 tablet by mouth as needed for Erectile Dysfunction 30 tablet 3    olmesartan (BENICAR) 40 MG tablet Take 1 tablet by mouth daily 30 tablet 5     No current facility-administered medications for this visit.     No Known Allergies  [unfilled]    Past Medical History:   Diagnosis Date    Atrial fibrillation (HCC)     Chest pain 8/11/2016    Chronic cellulitis     right leg \"blood flows opposite in both legs\", currently on Amoxicillin    Diabetes

## 2025-02-27 ENCOUNTER — TELEPHONE (OUTPATIENT)
Dept: INTERNAL MEDICINE CLINIC | Facility: CLINIC | Age: 65
End: 2025-02-27

## 2025-02-27 NOTE — TELEPHONE ENCOUNTER
----- Message from Bobo MILLER sent at 2/27/2025  9:05 AM EST -----  Regarding: E[facility] Appointment Request  E[facility] Appointment Request    Patient needs appointment for ECC Appointment Type: New Patient.    Patient Requested Dates(s): No specific  Patient Requested Time: No specific  Provider Name: Tonia Mcintosh    Reason for Appointment Request: New Patient - No appointments available during search    Additional Info : Pt would like to schedule an appointment and establish with Dr. Nick Randall and this is for Physical and labs as well.  --------------------------------------------------------------------------------------------------------------------------    Relationship to Patient: Spouse/Partner Dariana Dia     Call Back Information: OK to leave message on voicemail  Preferred Call Back Number: Phone [phone]

## 2025-04-01 DIAGNOSIS — I48.19 PERSISTENT ATRIAL FIBRILLATION (HCC): ICD-10-CM

## 2025-04-05 DIAGNOSIS — J30.1 SEASONAL ALLERGIC RHINITIS DUE TO POLLEN: ICD-10-CM

## 2025-04-07 RX ORDER — MONTELUKAST SODIUM 10 MG/1
10 TABLET ORAL DAILY
Qty: 90 TABLET | Refills: 0 | OUTPATIENT
Start: 2025-04-07

## 2025-04-21 ENCOUNTER — OFFICE VISIT (OUTPATIENT)
Age: 65
End: 2025-04-21
Payer: COMMERCIAL

## 2025-04-21 VITALS
DIASTOLIC BLOOD PRESSURE: 72 MMHG | WEIGHT: 239 LBS | SYSTOLIC BLOOD PRESSURE: 138 MMHG | HEART RATE: 67 BPM | HEIGHT: 72 IN | BODY MASS INDEX: 32.37 KG/M2

## 2025-04-21 DIAGNOSIS — I10 PRIMARY HYPERTENSION: Chronic | ICD-10-CM

## 2025-04-21 DIAGNOSIS — I48.0 PAROXYSMAL ATRIAL FIBRILLATION (HCC): Primary | Chronic | ICD-10-CM

## 2025-04-21 DIAGNOSIS — E78.2 MIXED HYPERLIPIDEMIA: Chronic | ICD-10-CM

## 2025-04-21 PROCEDURE — 99214 OFFICE O/P EST MOD 30 MIN: CPT | Performed by: INTERNAL MEDICINE

## 2025-04-21 PROCEDURE — 3075F SYST BP GE 130 - 139MM HG: CPT | Performed by: INTERNAL MEDICINE

## 2025-04-21 PROCEDURE — 3078F DIAST BP <80 MM HG: CPT | Performed by: INTERNAL MEDICINE

## 2025-04-21 ASSESSMENT — ENCOUNTER SYMPTOMS: SHORTNESS OF BREATH: 0

## 2025-04-21 NOTE — PROGRESS NOTES
[facility] CARDIOLOGY  [address] [place] [address], [address]  [place] [address]  PHONE: [phone]      25    NAME:  Williams Dia  : 1960  MRN: [id number]         SUBJECTIVE:   Williams Dia is a 64 y.o. male seen for a follow up visit regarding the following:     Chief Complaint   Patient presents with    Atrial Fibrillation            HPI:  Follow up  Atrial Fibrillation   .      64 y.o. male with PMH HTN, HLD, T2DM, DELTA, paroxysmal atrial fibrillation s/p ablation presenting for follow up evaluation. Patient reports feeling well overall. No chest pain, dyspnea or palpitations.        Cardiac Medications       Beta Blockers Cardio-Selective       metoprolol succinate (TOPROL XL) 50 MG extended release tablet Take 1 tablet by mouth daily       Angiotensin II Receptor Antagonists       olmesartan (BENICAR) 40 MG tablet Take 1 tablet by mouth daily       Direct Factor Xa Inhibitors       apixaban (ELIQUIS) 5 MG TABS tablet Take 1 tablet by mouth 2 times daily          Diabetic Medications       Sulfonylureas       glimepiride (AMARYL) 2 MG tablet Take 1 tablet by mouth daily                Past Medical History, Past Surgical History, Family history, Social History, and Medications were all reviewed with the patient today and updated as necessary.     Prior to Admission medications    Medication Sig Start Date End Date Taking? Authorizing Provider   olmesartan (BENICAR) 40 MG tablet Take 1 tablet by mouth daily 25  Yes Dean Garay MD   valACYclovir (VALTREX) 1 g tablet Take 1 tablet by mouth as needed (PRN) TAKE 1 TABLET BY MOUTH THREE TIMES A DAY 10/23/24  Yes Danny Sanchez, DO   metoprolol succinate (TOPROL XL) 50 MG extended release tablet Take 1 tablet by mouth daily 24  Yes Danny Sanchez, DO   glimepiride (AMARYL) 2 MG tablet Take 1 tablet by mouth daily 24  Yes Yusef Cummings MD   montelukast (SINGULAIR) 10 MG tablet Take 1 tablet by mouth daily

## 2025-05-12 ENCOUNTER — RESULTS FOLLOW-UP (OUTPATIENT)
Age: 65
End: 2025-05-12

## 2025-06-15 NOTE — ASSESSMENT & PLAN NOTE
JULIANO on 11/18/2024 as follows:  -EF of 55 to 60%, normal LV wall motion  -No left atrial appendage thrombus  -Normal RV systolic function  -Mild AR  -No pericardial effusion  Status post ablation on 11/18/2024  Echo on 2/9/2025 as follows:  -EF 66% with mildly increased LV wall thickness but normal diastolic function  -Normal RV systolic function  -Mild AV cusp sclerosis with mild AR  -MAC  Continue metoprolol succinate.  No longer on anticoagulation.  Follow-up with cardiology  Patient with some pedal edema but I suspect this to be dependent edema.  Counseled on elevating affected extremities as he has been doing    Orders:    CBC with Auto Differential; Future    Comprehensive Metabolic Panel; Future    TSH; Future    Magnesium; Future    metoprolol succinate (TOPROL XL) 50 MG extended release tablet; Take 1 tablet by mouth daily

## 2025-06-15 NOTE — PROGRESS NOTES
Williams Dia (:  1960) is a 64 y.o. male,Established patient, here for evaluation of the following chief complaint(s):  New Patient (Establish care)         Assessment & Plan  Essential hypertension  Continue olmesartan, metoprolol succinate    Orders:    CBC with Auto Differential; Future    Comprehensive Metabolic Panel; Future    Lipid Panel; Future    Albumin/Creatinine Ratio, Urine; Future    TSH; Future    Urinalysis; Future    olmesartan (BENICAR) 40 MG tablet; Take 1 tablet by mouth daily    metoprolol succinate (TOPROL XL) 50 MG extended release tablet; Take 1 tablet by mouth daily    Dyslipidemia  Lipid panel from 2024 with elevated triglycerides of 327, LDL of 91, technically above goal of 70 given underlying diabetes  Recheck lipid panel  Discussed role of statin therapy but patient declines    Orders:    CBC with Auto Differential; Future    Comprehensive Metabolic Panel; Future    Lipid Panel; Future    TSH; Future    Type 2 diabetes mellitus without complication, without long-term current use of insulin ([facility])  A1c 5.7% on 2024, recheck in the office today 6.5%  Urine albumin to creatinine ratio within normal limits on 2023, recheck with upcoming labs  Check fasting blood sugar daily.  Continue glimepiride with careful monitoring for hypoglycemia.  Counseled on signs, symptoms and management of hypoglycemia    Orders:    AMB POC HEMOGLOBIN A1C    CBC with Auto Differential; Future    Comprehensive Metabolic Panel; Future    Lipid Panel; Future    Albumin/Creatinine Ratio, Urine; Future    TSH; Future    Urinalysis; Future    glimepiride (AMARYL) 2 MG tablet; Take 1 tablet by mouth daily TAKE WITH FOOD    Obstructive sleep apnea  Continue CPAP          Paroxysmal atrial fibrillation (HCC)  JULIANO on 2024 as follows:  -EF of 55 to 60%, normal LV wall motion  -No left atrial appendage thrombus  -Normal RV systolic function  -Mild AR  -No pericardial effusion  Status post

## 2025-06-19 SDOH — HEALTH STABILITY: PHYSICAL HEALTH: ON AVERAGE, HOW MANY DAYS PER WEEK DO YOU ENGAGE IN MODERATE TO STRENUOUS EXERCISE (LIKE A BRISK WALK)?: 2 DAYS

## 2025-06-19 SDOH — HEALTH STABILITY: PHYSICAL HEALTH: ON AVERAGE, HOW MANY MINUTES DO YOU ENGAGE IN EXERCISE AT THIS LEVEL?: 60 MIN

## 2025-06-20 ENCOUNTER — LAB (OUTPATIENT)
Dept: INTERNAL MEDICINE CLINIC | Facility: CLINIC | Age: 65
End: 2025-06-20

## 2025-06-20 ENCOUNTER — RESULTS FOLLOW-UP (OUTPATIENT)
Dept: INTERNAL MEDICINE CLINIC | Facility: CLINIC | Age: 65
End: 2025-06-20

## 2025-06-20 ENCOUNTER — OFFICE VISIT (OUTPATIENT)
Dept: INTERNAL MEDICINE CLINIC | Facility: CLINIC | Age: 65
End: 2025-06-20
Payer: MEDICARE

## 2025-06-20 VITALS
BODY MASS INDEX: 31.56 KG/M2 | TEMPERATURE: 98.1 F | WEIGHT: 233 LBS | HEIGHT: 72 IN | OXYGEN SATURATION: 97 % | HEART RATE: 59 BPM | DIASTOLIC BLOOD PRESSURE: 74 MMHG | SYSTOLIC BLOOD PRESSURE: 136 MMHG

## 2025-06-20 DIAGNOSIS — N52.9 ERECTILE DYSFUNCTION, UNSPECIFIED ERECTILE DYSFUNCTION TYPE: ICD-10-CM

## 2025-06-20 DIAGNOSIS — I10 ESSENTIAL HYPERTENSION: Primary | ICD-10-CM

## 2025-06-20 DIAGNOSIS — T78.40XS ALLERGY, SEQUELA: ICD-10-CM

## 2025-06-20 DIAGNOSIS — E78.5 DYSLIPIDEMIA: ICD-10-CM

## 2025-06-20 DIAGNOSIS — Z12.5 PROSTATE CANCER SCREENING: ICD-10-CM

## 2025-06-20 DIAGNOSIS — E11.9 TYPE 2 DIABETES MELLITUS WITHOUT COMPLICATION, WITHOUT LONG-TERM CURRENT USE OF INSULIN (HCC): ICD-10-CM

## 2025-06-20 DIAGNOSIS — I10 ESSENTIAL HYPERTENSION: ICD-10-CM

## 2025-06-20 DIAGNOSIS — I48.0 PAROXYSMAL ATRIAL FIBRILLATION (HCC): ICD-10-CM

## 2025-06-20 DIAGNOSIS — T25.122A SUPERFICIAL BURN OF LEFT FOOT, INITIAL ENCOUNTER: ICD-10-CM

## 2025-06-20 DIAGNOSIS — G47.33 OBSTRUCTIVE SLEEP APNEA: ICD-10-CM

## 2025-06-20 DIAGNOSIS — B00.1 RECURRENT COLD SORES: ICD-10-CM

## 2025-06-20 LAB
ALBUMIN SERPL-MCNC: 3.8 G/DL (ref 3.2–4.6)
ALBUMIN/GLOB SERPL: 1.2 (ref 1–1.9)
ALP SERPL-CCNC: 91 U/L (ref 40–129)
ALT SERPL-CCNC: 25 U/L (ref 8–55)
ANION GAP SERPL CALC-SCNC: 12 MMOL/L (ref 7–16)
APPEARANCE UR: CLEAR
AST SERPL-CCNC: 19 U/L (ref 15–37)
BASOPHILS # BLD: 0.04 K/UL (ref 0–0.2)
BASOPHILS NFR BLD: 0.7 % (ref 0–2)
BILIRUB SERPL-MCNC: 0.7 MG/DL (ref 0–1.2)
BILIRUB UR QL: NEGATIVE
BUN SERPL-MCNC: 17 MG/DL (ref 8–23)
CALCIUM SERPL-MCNC: 9.7 MG/DL (ref 8.8–10.2)
CHLORIDE SERPL-SCNC: 102 MMOL/L (ref 98–107)
CHOLEST SERPL-MCNC: 156 MG/DL (ref 0–200)
CO2 SERPL-SCNC: 22 MMOL/L (ref 20–29)
COLOR UR: NORMAL
CREAT SERPL-MCNC: 1.1 MG/DL (ref 0.8–1.3)
CREAT UR-MCNC: 97.9 MG/DL (ref 39–259)
DIFFERENTIAL METHOD BLD: ABNORMAL
EOSINOPHIL # BLD: 0.32 K/UL (ref 0–0.8)
EOSINOPHIL NFR BLD: 5.3 % (ref 0.5–7.8)
ERYTHROCYTE [DISTWIDTH] IN BLOOD BY AUTOMATED COUNT: 11.6 % (ref 11.9–14.6)
GLOBULIN SER CALC-MCNC: 3.1 G/DL (ref 2.3–3.5)
GLUCOSE SERPL-MCNC: 162 MG/DL (ref 70–99)
GLUCOSE UR STRIP.AUTO-MCNC: NEGATIVE MG/DL
HBA1C MFR BLD: 6.5 %
HCT VFR BLD AUTO: 37.3 % (ref 41.1–50.3)
HDLC SERPL-MCNC: 25 MG/DL (ref 40–60)
HDLC SERPL: 6.3 (ref 0–5)
HGB BLD-MCNC: 13.8 G/DL (ref 13.6–17.2)
HGB UR QL STRIP: NEGATIVE
IMM GRANULOCYTES # BLD AUTO: 0.03 K/UL (ref 0–0.5)
IMM GRANULOCYTES NFR BLD AUTO: 0.5 % (ref 0–5)
KETONES UR QL STRIP.AUTO: NEGATIVE MG/DL
LDLC SERPL CALC-MCNC: ABNORMAL MG/DL (ref 0–100)
LDLC SERPL DIRECT ASSAY-MCNC: 68 MG/DL (ref 0–100)
LEUKOCYTE ESTERASE UR QL STRIP.AUTO: NEGATIVE
LYMPHOCYTES # BLD: 1.77 K/UL (ref 0.5–4.6)
LYMPHOCYTES NFR BLD: 29.4 % (ref 13–44)
MAGNESIUM SERPL-MCNC: 2 MG/DL (ref 1.8–2.4)
MCH RBC QN AUTO: 33.6 PG (ref 26.1–32.9)
MCHC RBC AUTO-ENTMCNC: 37 G/DL (ref 31.4–35)
MCV RBC AUTO: 90.8 FL (ref 82–102)
MICROALBUMIN UR-MCNC: 2.36 MG/DL (ref 0–20)
MICROALBUMIN/CREAT UR-RTO: 24 MG/G (ref 0–30)
MONOCYTES # BLD: 0.61 K/UL (ref 0.1–1.3)
MONOCYTES NFR BLD: 10.1 % (ref 4–12)
NEUTS SEG # BLD: 3.26 K/UL (ref 1.7–8.2)
NEUTS SEG NFR BLD: 54 % (ref 43–78)
NITRITE UR QL STRIP.AUTO: NEGATIVE
NRBC # BLD: 0 K/UL (ref 0–0.2)
PH UR STRIP: 5.5 (ref 5–9)
PLATELET # BLD AUTO: 184 K/UL (ref 150–450)
PMV BLD AUTO: 10.4 FL (ref 9.4–12.3)
POTASSIUM SERPL-SCNC: 4.6 MMOL/L (ref 3.5–5.1)
PROT SERPL-MCNC: 6.9 G/DL (ref 6.3–8.2)
PROT UR STRIP-MCNC: NEGATIVE MG/DL
PSA SERPL-MCNC: 1.8 NG/ML (ref 0–4)
RBC # BLD AUTO: 4.11 M/UL (ref 4.23–5.6)
SODIUM SERPL-SCNC: 136 MMOL/L (ref 136–145)
SP GR UR REFRACTOMETRY: 1.02 (ref 1–1.02)
TRIGL SERPL-MCNC: 453 MG/DL (ref 0–150)
TSH, 3RD GENERATION: 1.26 UIU/ML (ref 0.27–4.2)
UROBILINOGEN UR QL STRIP.AUTO: 0.2 EU/DL (ref 0.2–1)
VLDLC SERPL CALC-MCNC: 91 MG/DL (ref 6–23)
WBC # BLD AUTO: 6 K/UL (ref 4.3–11.1)

## 2025-06-20 PROCEDURE — 2022F DILAT RTA XM EVC RTNOPTHY: CPT | Performed by: STUDENT IN AN ORGANIZED HEALTH CARE EDUCATION/TRAINING PROGRAM

## 2025-06-20 PROCEDURE — 3046F HEMOGLOBIN A1C LEVEL >9.0%: CPT | Performed by: STUDENT IN AN ORGANIZED HEALTH CARE EDUCATION/TRAINING PROGRAM

## 2025-06-20 PROCEDURE — 3078F DIAST BP <80 MM HG: CPT | Performed by: STUDENT IN AN ORGANIZED HEALTH CARE EDUCATION/TRAINING PROGRAM

## 2025-06-20 PROCEDURE — 3017F COLORECTAL CA SCREEN DOC REV: CPT | Performed by: STUDENT IN AN ORGANIZED HEALTH CARE EDUCATION/TRAINING PROGRAM

## 2025-06-20 PROCEDURE — G8427 DOCREV CUR MEDS BY ELIG CLIN: HCPCS | Performed by: STUDENT IN AN ORGANIZED HEALTH CARE EDUCATION/TRAINING PROGRAM

## 2025-06-20 PROCEDURE — 3044F HG A1C LEVEL LT 7.0%: CPT | Performed by: STUDENT IN AN ORGANIZED HEALTH CARE EDUCATION/TRAINING PROGRAM

## 2025-06-20 PROCEDURE — 3075F SYST BP GE 130 - 139MM HG: CPT | Performed by: STUDENT IN AN ORGANIZED HEALTH CARE EDUCATION/TRAINING PROGRAM

## 2025-06-20 PROCEDURE — G8417 CALC BMI ABV UP PARAM F/U: HCPCS | Performed by: STUDENT IN AN ORGANIZED HEALTH CARE EDUCATION/TRAINING PROGRAM

## 2025-06-20 PROCEDURE — 99214 OFFICE O/P EST MOD 30 MIN: CPT | Performed by: STUDENT IN AN ORGANIZED HEALTH CARE EDUCATION/TRAINING PROGRAM

## 2025-06-20 PROCEDURE — 1036F TOBACCO NON-USER: CPT | Performed by: STUDENT IN AN ORGANIZED HEALTH CARE EDUCATION/TRAINING PROGRAM

## 2025-06-20 PROCEDURE — 83036 HEMOGLOBIN GLYCOSYLATED A1C: CPT | Performed by: STUDENT IN AN ORGANIZED HEALTH CARE EDUCATION/TRAINING PROGRAM

## 2025-06-20 RX ORDER — METOPROLOL SUCCINATE 50 MG/1
50 TABLET, EXTENDED RELEASE ORAL DAILY
Qty: 90 TABLET | Refills: 2 | Status: SHIPPED | OUTPATIENT
Start: 2025-06-20

## 2025-06-20 RX ORDER — MONTELUKAST SODIUM 10 MG/1
10 TABLET ORAL DAILY
Qty: 90 TABLET | Refills: 2 | Status: SHIPPED | OUTPATIENT
Start: 2025-06-20

## 2025-06-20 RX ORDER — CEPHALEXIN 500 MG/1
500 CAPSULE ORAL 3 TIMES DAILY
Qty: 15 CAPSULE | Refills: 0 | Status: SHIPPED | OUTPATIENT
Start: 2025-06-20 | End: 2025-06-25

## 2025-06-20 RX ORDER — SILDENAFIL 100 MG/1
TABLET, FILM COATED ORAL
Qty: 30 TABLET | Refills: 2 | Status: SHIPPED | OUTPATIENT
Start: 2025-06-20

## 2025-06-20 RX ORDER — PNV NO.95/FERROUS FUM/FOLIC AC 28MG-0.8MG
TABLET ORAL
COMMUNITY

## 2025-06-20 RX ORDER — SILVER SULFADIAZINE 10 MG/G
CREAM TOPICAL
Qty: 50 G | Refills: 0 | Status: SHIPPED | OUTPATIENT
Start: 2025-06-20

## 2025-06-20 RX ORDER — OLMESARTAN MEDOXOMIL 40 MG/1
40 TABLET ORAL DAILY
Qty: 90 TABLET | Refills: 2 | Status: SHIPPED | OUTPATIENT
Start: 2025-06-20

## 2025-06-20 RX ORDER — GLIMEPIRIDE 2 MG/1
2 TABLET ORAL
Qty: 90 TABLET | Refills: 2 | Status: SHIPPED | OUTPATIENT
Start: 2025-06-20

## 2025-06-20 RX ORDER — VALACYCLOVIR HYDROCHLORIDE 1 G/1
TABLET, FILM COATED ORAL
Qty: 30 TABLET | Refills: 2 | Status: SHIPPED | OUTPATIENT
Start: 2025-06-20

## 2025-06-20 SDOH — ECONOMIC STABILITY: FOOD INSECURITY: WITHIN THE PAST 12 MONTHS, YOU WORRIED THAT YOUR FOOD WOULD RUN OUT BEFORE YOU GOT MONEY TO BUY MORE.: NEVER TRUE

## 2025-06-20 SDOH — ECONOMIC STABILITY: FOOD INSECURITY: WITHIN THE PAST 12 MONTHS, THE FOOD YOU BOUGHT JUST DIDN'T LAST AND YOU DIDN'T HAVE MONEY TO GET MORE.: NEVER TRUE

## 2025-06-20 ASSESSMENT — PATIENT HEALTH QUESTIONNAIRE - PHQ9
SUM OF ALL RESPONSES TO PHQ QUESTIONS 1-9: 0
2. FEELING DOWN, DEPRESSED OR HOPELESS: NOT AT ALL
1. LITTLE INTEREST OR PLEASURE IN DOING THINGS: NOT AT ALL

## 2025-06-20 ASSESSMENT — ENCOUNTER SYMPTOMS
ABDOMINAL PAIN: 0
NAUSEA: 0
CONSTIPATION: 0
VOMITING: 0
SHORTNESS OF BREATH: 0

## 2025-07-14 ENCOUNTER — TELEPHONE (OUTPATIENT)
Dept: INTERNAL MEDICINE CLINIC | Facility: CLINIC | Age: 65
End: 2025-07-14

## 2025-07-14 NOTE — TELEPHONE ENCOUNTER
----- Message from Too ZACARIAS sent at 7/14/2025  8:30 AM EDT -----  Regarding: [facility] Appointment Request  [facility] Appointment Request    Patient needs appointment for [facility] Appointment Type: New to Provider.    Patient Requested Dates(s): Soonest availability  Patient Requested Time: Any   Provider Name: Bev Doherty MD    Reason for Appointment Request: Established Patient - No appointments available during search  --------------------------------------------------------------------------------------------------------------------------    Relationship to Patient: Self     Call Back Information: OK to leave message on voicemail  Preferred Call Back Number: Phone [phone]

## (undated) DEVICE — 18G NG KIT WITH 96IN PROBE COVER (10 PK): Brand: SITE-RITE

## (undated) DEVICE — CATHETER ABLAT 8FR L115CM 1-6-2MM SPC TIP 3.5MM FJ CRV

## (undated) DEVICE — CATHETER MAP F CRV 2-2-2-2-2 MM SPC PERSEID OCTARAY

## (undated) DEVICE — CATHETER EP 7FR L115CM 2-8-2MM SPC TIP 2MM 10 ELECTRD F L

## (undated) DEVICE — PINNACLE TIF INTRODUCER SHEATH: Brand: PINNACLE

## (undated) DEVICE — TUBING PMP FOR CARTO SYS SMARTABLATE

## (undated) DEVICE — SYSTEM CLOSURE 6-12 FR VEN VASC VASCADE MVP

## (undated) DEVICE — STERILE (15.2 TAPERED TO 7.6 X 183CM) POLYETHYLENE ACCORDION-FOLDED COVER FOR USE WITH SIEMENS ACUNAV ULTRASOUND CATHETER FAMILY CONNECTOR: Brand: SWIFTLINK TRANSDUCER COVER

## (undated) DEVICE — SHEATH INTRO 50 DEG 0.038 INX180 CM 8.5 FRX63 CM HEARTSPAN

## (undated) DEVICE — CATHETER US GE COMPATIBILITY SOUNDSTAR ECO 10FR

## (undated) DEVICE — Device: Brand: NRG TRANSSEPTAL NEEDLE

## (undated) DEVICE — PRESSURE MONITORING SET: Brand: TRUWAVE

## (undated) DEVICE — PINNACLE INTRODUCER SHEATH: Brand: PINNACLE

## (undated) DEVICE — CATHETER REPROC EP F-J BIOBD710FJ282CTR